# Patient Record
Sex: MALE | Race: OTHER | NOT HISPANIC OR LATINO
[De-identification: names, ages, dates, MRNs, and addresses within clinical notes are randomized per-mention and may not be internally consistent; named-entity substitution may affect disease eponyms.]

---

## 2018-09-10 ENCOUNTER — APPOINTMENT (OUTPATIENT)
Dept: INTERNAL MEDICINE | Facility: CLINIC | Age: 63
End: 2018-09-10
Payer: COMMERCIAL

## 2018-09-10 VITALS
HEART RATE: 67 BPM | TEMPERATURE: 98.3 F | OXYGEN SATURATION: 100 % | DIASTOLIC BLOOD PRESSURE: 96 MMHG | RESPIRATION RATE: 12 BRPM | SYSTOLIC BLOOD PRESSURE: 177 MMHG | HEIGHT: 67 IN | BODY MASS INDEX: 19.78 KG/M2 | WEIGHT: 126 LBS

## 2018-09-10 VITALS — SYSTOLIC BLOOD PRESSURE: 166 MMHG | DIASTOLIC BLOOD PRESSURE: 80 MMHG

## 2018-09-10 DIAGNOSIS — Z82.0 FAMILY HISTORY OF EPILEPSY AND OTHER DISEASES OF THE NERVOUS SYSTEM: ICD-10-CM

## 2018-09-10 DIAGNOSIS — J03.90 ACUTE TONSILLITIS, UNSPECIFIED: ICD-10-CM

## 2018-09-10 DIAGNOSIS — I25.9 CHRONIC ISCHEMIC HEART DISEASE, UNSPECIFIED: ICD-10-CM

## 2018-09-10 DIAGNOSIS — R94.31 ABNORMAL ELECTROCARDIOGRAM [ECG] [EKG]: ICD-10-CM

## 2018-09-10 DIAGNOSIS — Z12.11 ENCOUNTER FOR SCREENING FOR MALIGNANT NEOPLASM OF COLON: ICD-10-CM

## 2018-09-10 DIAGNOSIS — Z00.00 ENCOUNTER FOR GENERAL ADULT MEDICAL EXAMINATION W/OUT ABNORMAL FINDINGS: ICD-10-CM

## 2018-09-10 DIAGNOSIS — I51.7 CARDIOMEGALY: ICD-10-CM

## 2018-09-10 DIAGNOSIS — Z80.52 FAMILY HISTORY OF MALIGNANT NEOPLASM OF BLADDER: ICD-10-CM

## 2018-09-10 DIAGNOSIS — Z13.6 ENCOUNTER FOR SCREENING FOR CARDIOVASCULAR DISORDERS: ICD-10-CM

## 2018-09-10 PROCEDURE — 93000 ELECTROCARDIOGRAM COMPLETE: CPT

## 2018-09-10 PROCEDURE — 99214 OFFICE O/P EST MOD 30 MIN: CPT

## 2018-09-10 PROCEDURE — 99386 PREV VISIT NEW AGE 40-64: CPT

## 2018-09-23 PROBLEM — Z13.6 SCREENING FOR ISCHEMIC HEART DISEASE (IHD): Status: ACTIVE | Noted: 2018-09-23

## 2018-09-23 PROBLEM — I51.7 LAE (LEFT ATRIAL ENLARGEMENT): Noted: 2018-09-10

## 2018-09-23 PROBLEM — Z00.00 ENCOUNTER FOR PREVENTIVE HEALTH EXAMINATION: Status: ACTIVE | Noted: 2018-08-11

## 2018-09-23 PROBLEM — I25.9 IHD (ISCHEMIC HEART DISEASE): Noted: 2018-09-10

## 2018-09-23 PROBLEM — Z12.11 SCREENING FOR COLON CANCER: Status: ACTIVE | Noted: 2018-09-10

## 2018-09-23 PROBLEM — R94.31 ABNORMAL EKG: Noted: 2018-09-23

## 2018-09-23 LAB
25(OH)D3 SERPL-MCNC: 53.1 NG/ML
ALBUMIN SERPL ELPH-MCNC: 4.7 G/DL
ALP BLD-CCNC: 56 U/L
ALT SERPL-CCNC: 21 U/L
ANION GAP SERPL CALC-SCNC: 14 MMOL/L
APPEARANCE: CLEAR
AST SERPL-CCNC: 31 U/L
BILIRUB SERPL-MCNC: 0.5 MG/DL
BILIRUBIN URINE: NEGATIVE
BLOOD URINE: NEGATIVE
BUN SERPL-MCNC: 15 MG/DL
CALCIUM SERPL-MCNC: 9.6 MG/DL
CHLORIDE SERPL-SCNC: 101 MMOL/L
CHOLEST SERPL-MCNC: 162 MG/DL
CHOLEST/HDLC SERPL: 2.3 RATIO
CO2 SERPL-SCNC: 28 MMOL/L
COLOR: YELLOW
CREAT SERPL-MCNC: 0.78 MG/DL
ESTIMATED AVERAGE GLUCOSE: 108 MG/DL
FOLATE SERPL-MCNC: >20 NG/ML
GLUCOSE QUALITATIVE U: NEGATIVE MG/DL
GLUCOSE SERPL-MCNC: 98 MG/DL
HBA1C MFR BLD HPLC: 5.4 %
HDLC SERPL-MCNC: 71 MG/DL
IRON SERPL-MCNC: 73 UG/DL
KETONES URINE: NEGATIVE
LDLC SERPL CALC-MCNC: 85 MG/DL
LEUKOCYTE ESTERASE URINE: NEGATIVE
NITRITE URINE: NEGATIVE
PH URINE: 7
POTASSIUM SERPL-SCNC: 5.1 MMOL/L
PROT SERPL-MCNC: 7.7 G/DL
PROTEIN URINE: NEGATIVE MG/DL
PSA SERPL-MCNC: 4.29 NG/ML
SODIUM SERPL-SCNC: 143 MMOL/L
SPECIFIC GRAVITY URINE: 1.01
TRIGL SERPL-MCNC: 31 MG/DL
TSH SERPL-ACNC: 2.75 UIU/ML
UROBILINOGEN URINE: NEGATIVE MG/DL
VIT B12 SERPL-MCNC: 390 PG/ML

## 2018-09-23 NOTE — HISTORY OF PRESENT ILLNESS
[FreeTextEntry1] : New CPE [de-identified] : 63 yr old male w/hx of anxiety and HTN here to establish care.  \par \par C/O nocturia for several years.  Wakes 4x nightly; no frequency during day.  Not seen by urology for several years.  \par \par Last colonoscopy = 4 yr ago, but pt's bowel wasn't completely cleared despite finishing entire golytely prep.  What could be seen appeared normal to GI.  Overdue for F/U.\par \par Never had cardio eval / stress test.  Hx of HTN, but not on meds.  Always felt it was nervousness.

## 2018-09-23 NOTE — PHYSICAL EXAM
[No Acute Distress] : no acute distress [Well Nourished] : well nourished [Well Developed] : well developed [Well-Appearing] : well-appearing [Normal Voice/Communication] : normal voice/communication [Normal Sclera/Conjunctiva] : normal sclera/conjunctiva [PERRL] : pupils equal round and reactive to light [EOMI] : extraocular movements intact [Normal Outer Ear/Nose] : the outer ears and nose were normal in appearance [Normal Oropharynx] : the oropharynx was normal [No JVD] : no jugular venous distention [Supple] : supple [No Lymphadenopathy] : no lymphadenopathy [Thyroid Normal, No Nodules] : the thyroid was normal and there were no nodules present [No Respiratory Distress] : no respiratory distress  [Clear to Auscultation] : lungs were clear to auscultation bilaterally [No Accessory Muscle Use] : no accessory muscle use [Normal Rate] : normal rate  [Regular Rhythm] : with a regular rhythm [Normal S1, S2] : normal S1 and S2 [No Murmur] : no murmur heard [No Carotid Bruits] : no carotid bruits [No Edema] : there was no peripheral edema [Soft] : abdomen soft [Non Tender] : non-tender [Non-distended] : non-distended [No Masses] : no abdominal mass palpated [No HSM] : no HSM [Normal Bowel Sounds] : normal bowel sounds [Normal Supraclavicular Nodes] : no supraclavicular lymphadenopathy [Normal Posterior Cervical Nodes] : no posterior cervical lymphadenopathy [Normal Anterior Cervical Nodes] : no anterior cervical lymphadenopathy [No CVA Tenderness] : no CVA  tenderness [No Spinal Tenderness] : no spinal tenderness [No Joint Swelling] : no joint swelling [Grossly Normal Strength/Tone] : grossly normal strength/tone [No Rash] : no rash [Normal Gait] : normal gait [Coordination Grossly Intact] : coordination grossly intact [No Focal Deficits] : no focal deficits [Deep Tendon Reflexes (DTR)] : deep tendon reflexes were 2+ and symmetric [Speech Grossly Normal] : speech grossly normal [Memory Grossly Normal] : memory grossly normal [Normal Affect] : the affect was normal [Alert and Oriented x3] : oriented to person, place, and time [Normal Insight/Judgement] : insight and judgment were intact [de-identified] : unkempt [de-identified] : anxious mood

## 2018-09-23 NOTE — DATA REVIEWED
[FreeTextEntry1] : First EKG = NSR, low voltage QRS, anterolateral infarct.  Deemed erroneous due to lead placement.\par \par 2nd EKG = EKG done in office = NSR, no Q-waves, no ST changes, no inverted T-waves, no ectopy.

## 2018-09-23 NOTE — REVIEW OF SYSTEMS
[Nocturia] : nocturia [Anxiety] : anxiety [Negative] : Heme/Lymph [Dysuria] : no dysuria [Incontinence] : no incontinence [Hesitancy] : no hesitancy [Hematuria] : no hematuria [Frequency] : no frequency [Suicidal] : not suicidal [Insomnia] : no insomnia [Depression] : no depression

## 2018-09-23 NOTE — ASSESSMENT
[FreeTextEntry1] : 63 yr old male w/hx of anxiety, HTN, chronic fatigue, nocturia, here for initial visit and to have CPE/annual wellness exam, presenting today with the following: \par \par Health Maintenance = patient's exam is normal except as stated.  Labs to be collected today. \par \par HTN, anxiety, screening for IHD = normal EKG.  Labs ordered.  Referring to Cardio.\par \par Nocturia, Prostate Cancer Screening = referring patient to urology.  Urine studies & PSA collected.\par \par Colon Cancer Screening = referring patient to gastroenterology.  Stool sample ordered.\par \par Chronic fatigue = labs done to assess.  \par \par Counseled patient for greater than 50% of this visit, including diagnoses information, medication usage and side effects, therapeutic goals and options, and followup. Patient's questions were answered. Patient expressed understanding of, and agreement with, assessment and plan.\par \par Ahmet Martinez MD

## 2018-09-24 ENCOUNTER — APPOINTMENT (OUTPATIENT)
Dept: INTERNAL MEDICINE | Facility: CLINIC | Age: 63
End: 2018-09-24
Payer: COMMERCIAL

## 2018-09-24 VITALS
BODY MASS INDEX: 20.09 KG/M2 | WEIGHT: 128 LBS | DIASTOLIC BLOOD PRESSURE: 78 MMHG | OXYGEN SATURATION: 99 % | RESPIRATION RATE: 12 BRPM | HEART RATE: 51 BPM | TEMPERATURE: 98 F | HEIGHT: 67 IN | SYSTOLIC BLOOD PRESSURE: 161 MMHG

## 2018-09-24 VITALS — SYSTOLIC BLOOD PRESSURE: 138 MMHG | DIASTOLIC BLOOD PRESSURE: 74 MMHG

## 2018-09-24 DIAGNOSIS — Z78.9 OTHER SPECIFIED HEALTH STATUS: ICD-10-CM

## 2018-09-24 DIAGNOSIS — Z23 ENCOUNTER FOR IMMUNIZATION: ICD-10-CM

## 2018-09-24 PROCEDURE — 90686 IIV4 VACC NO PRSV 0.5 ML IM: CPT

## 2018-09-24 PROCEDURE — 90471 IMMUNIZATION ADMIN: CPT

## 2018-09-24 PROCEDURE — 99214 OFFICE O/P EST MOD 30 MIN: CPT | Mod: 25

## 2018-09-25 ENCOUNTER — MEDICATION RENEWAL (OUTPATIENT)
Age: 63
End: 2018-09-25

## 2018-09-27 PROBLEM — Z23 FLU VACCINE NEED: Status: ACTIVE | Noted: 2018-09-24

## 2018-09-27 NOTE — HISTORY OF PRESENT ILLNESS
[FreeTextEntry1] : F/u [de-identified] : 63 yr old male w/hx of anxiety and HTN.  Recently seen for CPE, noted to have uncontrolled HTN and was started on meds at that time.  Hx of HTN, but not on meds.  Always felt it was nervousness.  Never had cardio eval / stress test.  Taking medications as prescribed. Denies any HA, LH, LOC, COV, palpitations, CP, SOB, PEACE, abdominal pain, edema, claudication, limb weakness or paresthesias. \par \par Nocturia = chronic, denies bleeding, dysuria, pelvic or scrotal pain.  No Urology eval recently\par \par Requesting Flu vaccine today\par \par Also to review recent labs.

## 2018-09-27 NOTE — ASSESSMENT
[FreeTextEntry1] : 63 yr old male w/hx of anxiety, HTN, chronic fatigue, nocturia, here for the following:\par \par HTN = BP responding to metoprolol. Not at goal. Still needs to see cardiologist for her assessment. To followup in 2 weeks for BP recheck.\par \par PSA elevation, Nocturia, Prostate Cancer Screening = reviewed results with patient; referring patient to urology.  \par \par Colon Cancer Screening = reminded patient to complete stool sample.\par \par Flu vaccine today. \par \par Counseled patient for greater than 50% of this visit, including diagnoses information, medication usage and side effects, therapeutic goals and options, and followup. Patient's questions were answered. Patient expressed understanding of, and agreement with, assessment and plan.\par \par Ahmet Martinez MD

## 2018-09-27 NOTE — PHYSICAL EXAM
[No Acute Distress] : no acute distress [Well Nourished] : well nourished [Well Developed] : well developed [Well-Appearing] : well-appearing [Normal Voice/Communication] : normal voice/communication [Normal Sclera/Conjunctiva] : normal sclera/conjunctiva [EOMI] : extraocular movements intact [Normal Outer Ear/Nose] : the outer ears and nose were normal in appearance [No JVD] : no jugular venous distention [Supple] : supple [No Respiratory Distress] : no respiratory distress  [Clear to Auscultation] : lungs were clear to auscultation bilaterally [No Accessory Muscle Use] : no accessory muscle use [Normal Rate] : normal rate  [Regular Rhythm] : with a regular rhythm [Normal S1, S2] : normal S1 and S2 [No Murmur] : no murmur heard [No Carotid Bruits] : no carotid bruits [No Edema] : there was no peripheral edema [Soft] : abdomen soft [Non-distended] : non-distended [Normal Bowel Sounds] : normal bowel sounds [No Joint Swelling] : no joint swelling [Grossly Normal Strength/Tone] : grossly normal strength/tone [No Rash] : no rash [Normal Gait] : normal gait [Coordination Grossly Intact] : coordination grossly intact [No Focal Deficits] : no focal deficits [Deep Tendon Reflexes (DTR)] : deep tendon reflexes were 2+ and symmetric [Speech Grossly Normal] : speech grossly normal [Memory Grossly Normal] : memory grossly normal [Normal Affect] : the affect was normal [Alert and Oriented x3] : oriented to person, place, and time [Normal Insight/Judgement] : insight and judgment were intact [de-identified] : anxious mood

## 2018-09-29 ENCOUNTER — APPOINTMENT (OUTPATIENT)
Dept: CARDIOLOGY | Facility: CLINIC | Age: 63
End: 2018-09-29
Payer: COMMERCIAL

## 2018-09-29 ENCOUNTER — NON-APPOINTMENT (OUTPATIENT)
Age: 63
End: 2018-09-29

## 2018-09-29 VITALS
HEART RATE: 54 BPM | RESPIRATION RATE: 12 BRPM | HEIGHT: 67 IN | WEIGHT: 127 LBS | BODY MASS INDEX: 19.93 KG/M2 | OXYGEN SATURATION: 100 % | DIASTOLIC BLOOD PRESSURE: 87 MMHG | SYSTOLIC BLOOD PRESSURE: 155 MMHG | TEMPERATURE: 98 F

## 2018-09-29 VITALS — SYSTOLIC BLOOD PRESSURE: 136 MMHG | DIASTOLIC BLOOD PRESSURE: 80 MMHG | HEART RATE: 57 BPM

## 2018-09-29 DIAGNOSIS — Z86.79 PERSONAL HISTORY OF OTHER DISEASES OF THE CIRCULATORY SYSTEM: ICD-10-CM

## 2018-09-29 PROCEDURE — 99244 OFF/OP CNSLTJ NEW/EST MOD 40: CPT | Mod: 25

## 2018-09-29 PROCEDURE — 93000 ELECTROCARDIOGRAM COMPLETE: CPT

## 2018-10-10 LAB
BASOPHILS # BLD AUTO: 0.05 K/UL
BASOPHILS NFR BLD AUTO: 0.7 %
EOSINOPHIL # BLD AUTO: 0.1 K/UL
EOSINOPHIL NFR BLD AUTO: 1.4 %
HCT VFR BLD CALC: 42.1 %
HGB BLD-MCNC: 13.9 G/DL
IMM GRANULOCYTES NFR BLD AUTO: 0.1 %
LYMPHOCYTES # BLD AUTO: 1.43 K/UL
LYMPHOCYTES NFR BLD AUTO: 20 %
MAN DIFF?: NORMAL
MCHC RBC-ENTMCNC: 28.7 PG
MCHC RBC-ENTMCNC: 33 GM/DL
MCV RBC AUTO: 87 FL
MONOCYTES # BLD AUTO: 0.39 K/UL
MONOCYTES NFR BLD AUTO: 5.5 %
NEUTROPHILS # BLD AUTO: 5.17 K/UL
NEUTROPHILS NFR BLD AUTO: 72.3 %
PLATELET # BLD AUTO: 317 K/UL
RBC # BLD: 4.84 M/UL
RBC # FLD: 13.6 %
WBC # FLD AUTO: 7.15 K/UL

## 2018-10-15 ENCOUNTER — APPOINTMENT (OUTPATIENT)
Dept: CARDIOLOGY | Facility: CLINIC | Age: 63
End: 2018-10-15
Payer: COMMERCIAL

## 2018-10-15 ENCOUNTER — APPOINTMENT (OUTPATIENT)
Dept: UROLOGY | Facility: CLINIC | Age: 63
End: 2018-10-15
Payer: COMMERCIAL

## 2018-10-15 ENCOUNTER — APPOINTMENT (OUTPATIENT)
Dept: INTERNAL MEDICINE | Facility: CLINIC | Age: 63
End: 2018-10-15
Payer: COMMERCIAL

## 2018-10-15 VITALS
TEMPERATURE: 97.9 F | RESPIRATION RATE: 12 BRPM | WEIGHT: 127 LBS | BODY MASS INDEX: 19.93 KG/M2 | DIASTOLIC BLOOD PRESSURE: 79 MMHG | SYSTOLIC BLOOD PRESSURE: 146 MMHG | HEART RATE: 51 BPM | OXYGEN SATURATION: 100 % | HEIGHT: 67 IN

## 2018-10-15 VITALS — SYSTOLIC BLOOD PRESSURE: 134 MMHG | DIASTOLIC BLOOD PRESSURE: 70 MMHG

## 2018-10-15 DIAGNOSIS — Z12.5 ENCOUNTER FOR SCREENING FOR MALIGNANT NEOPLASM OF PROSTATE: ICD-10-CM

## 2018-10-15 DIAGNOSIS — R53.82 CHRONIC FATIGUE, UNSPECIFIED: ICD-10-CM

## 2018-10-15 DIAGNOSIS — R35.1 NOCTURIA: ICD-10-CM

## 2018-10-15 PROCEDURE — 99204 OFFICE O/P NEW MOD 45 MIN: CPT

## 2018-10-15 PROCEDURE — 93306 TTE W/DOPPLER COMPLETE: CPT

## 2018-10-15 PROCEDURE — 99214 OFFICE O/P EST MOD 30 MIN: CPT

## 2018-10-16 LAB
PSA FREE FLD-MCNC: 42.6
PSA FREE SERPL-MCNC: 1.03 NG/ML
PSA SERPL-MCNC: 2.42 NG/ML

## 2018-10-24 PROBLEM — R53.82 CHRONIC FATIGUE: Status: ACTIVE | Noted: 2018-09-10

## 2018-10-24 NOTE — HISTORY OF PRESENT ILLNESS
[FreeTextEntry1] : F/U [de-identified] : 63 yr old male w/hx of anxiety and HTN, for F/U of ongoing issues.  \par \par HTN = recently seen by Cardio.  Taking medications as prescribed. Denies any HA, LH, LOC, COV, palpitations, CP, SOB, PEACE, abdominal pain, edema, claudication, limb weakness or paresthesias. \par \par Nocturia = chronic, denies bleeding, dysuria, pelvic or scrotal pain.  Seen by Uro today.  Will be scheduling with Dr. Nava for further testing.

## 2018-10-24 NOTE — PHYSICAL EXAM
[No Acute Distress] : no acute distress [Well Nourished] : well nourished [Well Developed] : well developed [Well-Appearing] : well-appearing [Normal Voice/Communication] : normal voice/communication [Normal Sclera/Conjunctiva] : normal sclera/conjunctiva [EOMI] : extraocular movements intact [Normal Outer Ear/Nose] : the outer ears and nose were normal in appearance [No Respiratory Distress] : no respiratory distress  [Clear to Auscultation] : lungs were clear to auscultation bilaterally [No Accessory Muscle Use] : no accessory muscle use [Normal Rate] : normal rate  [Regular Rhythm] : with a regular rhythm [Normal S1, S2] : normal S1 and S2 [No Murmur] : no murmur heard [No Carotid Bruits] : no carotid bruits [No Edema] : there was no peripheral edema [Soft] : abdomen soft [Non-distended] : non-distended [No Joint Swelling] : no joint swelling [Grossly Normal Strength/Tone] : grossly normal strength/tone [No Rash] : no rash [Normal Gait] : normal gait [Coordination Grossly Intact] : coordination grossly intact [No Focal Deficits] : no focal deficits [Deep Tendon Reflexes (DTR)] : deep tendon reflexes were 2+ and symmetric [Speech Grossly Normal] : speech grossly normal [Memory Grossly Normal] : memory grossly normal [Normal Affect] : the affect was normal [Alert and Oriented x3] : oriented to person, place, and time [Normal Insight/Judgement] : insight and judgment were intact [de-identified] : anxious mood

## 2018-10-24 NOTE — ASSESSMENT
[FreeTextEntry1] : 63 yr old male w/hx of anxiety, HTN, chronic fatigue, nocturia, here for the following:\par \par HTN = BP responding to metoprolol. Not at goal. Still needs to see cardiologist for her assessment. To followup in 2 weeks for BP recheck.\par \par Chronic fatigue = no supportive lab findings.  Pt does have trouble sleeping due to nocturia, which will impact his restorative sleep time.  Sleep hygiene d/w pt.  If not improvement after nocturia is addressed, may need to consider sleep study.\par \par PSA elevation, Nocturia, Prostate Cancer Screening = reviewed results with patient; referring patient to urology.  \par \par Counseled patient for greater than 50% of this visit, including diagnoses information, medication usage and side effects, therapeutic goals and options, and followup. Patient's questions were answered. Patient expressed understanding of, and agreement with, assessment and plan.\par \par Ahmet Martinez MD

## 2018-11-23 ENCOUNTER — RESULT REVIEW (OUTPATIENT)
Age: 63
End: 2018-11-23

## 2018-11-23 LAB — HEMOCCULT STL QL IA: NEGATIVE

## 2019-02-02 ENCOUNTER — MEDICATION RENEWAL (OUTPATIENT)
Age: 64
End: 2019-02-02

## 2019-04-05 ENCOUNTER — MEDICATION RENEWAL (OUTPATIENT)
Age: 64
End: 2019-04-05

## 2019-04-19 ENCOUNTER — APPOINTMENT (OUTPATIENT)
Dept: INTERNAL MEDICINE | Facility: CLINIC | Age: 64
End: 2019-04-19
Payer: COMMERCIAL

## 2019-04-19 VITALS
HEIGHT: 67 IN | SYSTOLIC BLOOD PRESSURE: 134 MMHG | OXYGEN SATURATION: 98 % | RESPIRATION RATE: 12 BRPM | TEMPERATURE: 97.6 F | DIASTOLIC BLOOD PRESSURE: 82 MMHG | HEART RATE: 55 BPM | BODY MASS INDEX: 19.93 KG/M2 | WEIGHT: 127 LBS

## 2019-04-19 PROCEDURE — 99214 OFFICE O/P EST MOD 30 MIN: CPT

## 2019-05-03 ENCOUNTER — APPOINTMENT (OUTPATIENT)
Dept: CARDIOLOGY | Facility: CLINIC | Age: 64
End: 2019-05-03
Payer: COMMERCIAL

## 2019-05-03 VITALS — DIASTOLIC BLOOD PRESSURE: 74 MMHG | SYSTOLIC BLOOD PRESSURE: 124 MMHG

## 2019-05-03 VITALS
HEART RATE: 53 BPM | BODY MASS INDEX: 19.93 KG/M2 | WEIGHT: 127 LBS | TEMPERATURE: 98.2 F | OXYGEN SATURATION: 99 % | DIASTOLIC BLOOD PRESSURE: 69 MMHG | HEIGHT: 67 IN | SYSTOLIC BLOOD PRESSURE: 137 MMHG | RESPIRATION RATE: 12 BRPM

## 2019-05-03 PROCEDURE — 99214 OFFICE O/P EST MOD 30 MIN: CPT | Mod: 25

## 2019-05-04 NOTE — REVIEW OF SYSTEMS
[Feeling Fatigued] : feeling fatigued [Negative] : Heme/Lymph [Shortness Of Breath] : no shortness of breath [Lower Ext Edema] : no extremity edema [Dyspnea on exertion] : not dyspnea during exertion [Chest Pain] : no chest pain [Palpitations] : no palpitations

## 2019-05-04 NOTE — DISCUSSION/SUMMARY
[FreeTextEntry1] : IMPRESSION: Mr. SHI is a 63 year -old man with a history of hypertension, family history of coronary artery disease, and history of rheumatic fever who presents today for evaluation of his blood pressure. \par \par PLAN:\par 1. His blood pressure is very well controlled at this time. Given that he is bradycardic and experiences increased fatigue we will stop his Metoprolol and he will continue on diet modification. He will follow up in 6 weeks for a repeat blood pressure, and if it is elevated we will consider starting him on a different medication at that time. We also discussed decreasing the dose of Toprol XL to 12.5 mg daily, however, he prefers not to take anything at this time. \par 2. He will followup with me in 6 weeks for a blood pressure check or sooner if he is symptomatic.

## 2019-05-04 NOTE — PHYSICAL EXAM
[General Appearance - Well Developed] : well developed [Normal Appearance] : normal appearance [Well Groomed] : well groomed [General Appearance - Well Nourished] : well nourished [No Deformities] : no deformities [General Appearance - In No Acute Distress] : no acute distress [Normal Conjunctiva] : the conjunctiva exhibited no abnormalities [Normal Oral Mucosa] : normal oral mucosa [No Oral Pallor] : no oral pallor [No Oral Cyanosis] : no oral cyanosis [Normal Jugular Venous A Waves Present] : normal jugular venous A waves present [Normal Jugular Venous V Waves Present] : normal jugular venous V waves present [Respiration, Rhythm And Depth] : normal respiratory rhythm and effort [Auscultation Breath Sounds / Voice Sounds] : lungs were clear to auscultation bilaterally [Exaggerated Use Of Accessory Muscles For Inspiration] : no accessory muscle use [Bradycardia] : bradycardic [Rhythm Regular] : regular [Normal S1] : normal S1 [Normal S2] : normal S2 [No Pitting Edema] : no pitting edema present [Abdomen Soft] : soft [Abdomen Tenderness] : non-tender [Abnormal Walk] : normal gait [Gait - Sufficient For Exercise Testing] : the gait was sufficient for exercise testing [Nail Clubbing] : no clubbing of the fingernails [Cyanosis, Localized] : no localized cyanosis [Petechial Hemorrhages (___cm)] : no petechial hemorrhages [] : no rash [Skin Color & Pigmentation] : normal skin color and pigmentation [Skin Lesions] : no skin lesions [Oriented To Time, Place, And Person] : oriented to person, place, and time [Mood] : the mood was normal [Affect] : the affect was normal [No Anxiety] : not feeling anxious [FreeTextEntry1] : Extraocular muscles intact. Anicteric sclerae. [Left Carotid Bruit] : no bruit heard over the left carotid [Right Carotid Bruit] : no bruit heard over the right carotid [Bruit] : no bruit heard [Bowel Sounds] : normal bowel sounds [No Skin Ulcers] : no skin ulcer

## 2019-05-04 NOTE — HISTORY OF PRESENT ILLNESS
[FreeTextEntry1] : Patient is a 63-year-old man with a history of hypertension, family history of coronary artery disease, and history of rheumatic fever who presents today for evaluation of his blood pressure. He has been feeling increased fatigue while taking the Metoprolol. He has changed his diet, cutting out salt and meat. He otherwise denies any chest pain, dyspnea, palpitations, headaches, or dizziness.

## 2019-05-05 NOTE — PHYSICAL EXAM
[No Acute Distress] : no acute distress [Well Nourished] : well nourished [Well Developed] : well developed [Normal Sclera/Conjunctiva] : normal sclera/conjunctiva [Well-Appearing] : well-appearing [PERRL] : pupils equal round and reactive to light [EOMI] : extraocular movements intact [Normal Outer Ear/Nose] : the outer ears and nose were normal in appearance [Normal Oropharynx] : the oropharynx was normal [No JVD] : no jugular venous distention [Supple] : supple [No Lymphadenopathy] : no lymphadenopathy [Thyroid Normal, No Nodules] : the thyroid was normal and there were no nodules present [No Respiratory Distress] : no respiratory distress  [Clear to Auscultation] : lungs were clear to auscultation bilaterally [No Accessory Muscle Use] : no accessory muscle use [Normal Rate] : normal rate  [Normal S1, S2] : normal S1 and S2 [Regular Rhythm] : with a regular rhythm [No Murmur] : no murmur heard [No Carotid Bruits] : no carotid bruits [Pedal Pulses Present] : the pedal pulses are present [No Edema] : there was no peripheral edema [Soft] : abdomen soft [Non-distended] : non-distended [Non Tender] : non-tender [No HSM] : no HSM [No Masses] : no abdominal mass palpated [Normal Bowel Sounds] : normal bowel sounds [Normal Posterior Cervical Nodes] : no posterior cervical lymphadenopathy [Normal Anterior Cervical Nodes] : no anterior cervical lymphadenopathy [No CVA Tenderness] : no CVA  tenderness [No Joint Swelling] : no joint swelling [No Spinal Tenderness] : no spinal tenderness [Grossly Normal Strength/Tone] : grossly normal strength/tone [No Rash] : no rash [Normal Gait] : normal gait [Coordination Grossly Intact] : coordination grossly intact [No Focal Deficits] : no focal deficits [Normal Affect] : the affect was normal [Normal Insight/Judgement] : insight and judgment were intact

## 2019-05-05 NOTE — ASSESSMENT
[FreeTextEntry1] : 1. Hypertension\par continue Metoprolol\par cardio follow up \par 2. Elevated PSA\par following with urologist

## 2019-05-05 NOTE — HISTORY OF PRESENT ILLNESS
[de-identified] : Patient is a 63-year-old man with a history of hypertension,  rheumatic fever/ elevated PSA  who presents today for follow up and to establish care.He has been feeling increased fatigue while taking the Metoprolol. He has changed his diet, cutting out salt and meat. He otherwise denies any chest pain, dyspnea, palpitations, headaches, or dizziness , N, V, abdominal pain \par He is following with Dr. Nava for elevated PSA.

## 2019-06-26 ENCOUNTER — APPOINTMENT (OUTPATIENT)
Dept: CARDIOLOGY | Facility: CLINIC | Age: 64
End: 2019-06-26
Payer: COMMERCIAL

## 2019-06-26 VITALS
DIASTOLIC BLOOD PRESSURE: 75 MMHG | RESPIRATION RATE: 12 BRPM | SYSTOLIC BLOOD PRESSURE: 123 MMHG | HEIGHT: 67 IN | WEIGHT: 127 LBS | HEART RATE: 71 BPM | BODY MASS INDEX: 19.93 KG/M2 | OXYGEN SATURATION: 100 %

## 2019-06-26 DIAGNOSIS — I34.0 NONRHEUMATIC MITRAL (VALVE) INSUFFICIENCY: ICD-10-CM

## 2019-06-26 PROCEDURE — 99213 OFFICE O/P EST LOW 20 MIN: CPT | Mod: 25

## 2019-06-26 NOTE — HISTORY OF PRESENT ILLNESS
[FreeTextEntry1] : Patient is a 64-year-old man with a history of hypertension, family history of coronary artery disease, and history of rheumatic fever who presents today for follow up of blood pressure. He states that he has been feeling well off of Metoprolol and he has continued on his diet. He denies any chest pain, dyspnea, palpitations, headaches, or dizziness.

## 2019-06-26 NOTE — PHYSICAL EXAM
[General Appearance - Well Developed] : well developed [Normal Appearance] : normal appearance [Well Groomed] : well groomed [General Appearance - Well Nourished] : well nourished [No Deformities] : no deformities [General Appearance - In No Acute Distress] : no acute distress [Normal Conjunctiva] : the conjunctiva exhibited no abnormalities [Normal Oral Mucosa] : normal oral mucosa [No Oral Pallor] : no oral pallor [No Oral Cyanosis] : no oral cyanosis [Normal Jugular Venous A Waves Present] : normal jugular venous A waves present [Normal Jugular Venous V Waves Present] : normal jugular venous V waves present [Respiration, Rhythm And Depth] : normal respiratory rhythm and effort [Auscultation Breath Sounds / Voice Sounds] : lungs were clear to auscultation bilaterally [Exaggerated Use Of Accessory Muscles For Inspiration] : no accessory muscle use [Bowel Sounds] : normal bowel sounds [Abdomen Soft] : soft [Abdomen Tenderness] : non-tender [Abnormal Walk] : normal gait [Gait - Sufficient For Exercise Testing] : the gait was sufficient for exercise testing [Cyanosis, Localized] : no localized cyanosis [Nail Clubbing] : no clubbing of the fingernails [Petechial Hemorrhages (___cm)] : no petechial hemorrhages [Skin Color & Pigmentation] : normal skin color and pigmentation [Skin Lesions] : no skin lesions [] : no rash [No Skin Ulcers] : no skin ulcer [Oriented To Time, Place, And Person] : oriented to person, place, and time [Affect] : the affect was normal [Mood] : the mood was normal [No Anxiety] : not feeling anxious [Rhythm Regular] : regular [Normal S2] : normal S2 [Normal S1] : normal S1 [No Pitting Edema] : no pitting edema present [FreeTextEntry1] : Extraocular muscles intact. Anicteric sclerae. [Normal Rate] : normal [I] : a grade 1 [Right Carotid Bruit] : no bruit heard over the right carotid [Left Carotid Bruit] : no bruit heard over the left carotid [Bruit] : no bruit heard

## 2019-06-26 NOTE — DISCUSSION/SUMMARY
[FreeTextEntry1] : IMPRESSION: Mr. SHI is a 64 year -old man with a history of hypertension, family history of coronary artery disease, and history of rheumatic fever who presents today for follow up of his blood pressure. \par \par PLAN:\par 1. His blood pressure is very well controlled today as is his heart rate, thus he will continue off of medications. \par 2. He will schedule an echocardiogram to follow up his mitral regurgitation. \par 3. He will follow up with me in 4 months or sooner should he experience any symptoms in the interim.

## 2019-07-13 ENCOUNTER — APPOINTMENT (OUTPATIENT)
Dept: CARDIOLOGY | Facility: CLINIC | Age: 64
End: 2019-07-13

## 2019-09-13 ENCOUNTER — APPOINTMENT (OUTPATIENT)
Dept: INTERNAL MEDICINE | Facility: CLINIC | Age: 64
End: 2019-09-13
Payer: COMMERCIAL

## 2019-09-13 ENCOUNTER — LABORATORY RESULT (OUTPATIENT)
Age: 64
End: 2019-09-13

## 2019-09-13 VITALS
BODY MASS INDEX: 18.93 KG/M2 | TEMPERATURE: 97.8 F | WEIGHT: 120.6 LBS | HEIGHT: 67 IN | RESPIRATION RATE: 12 BRPM | OXYGEN SATURATION: 100 % | DIASTOLIC BLOOD PRESSURE: 73 MMHG | SYSTOLIC BLOOD PRESSURE: 134 MMHG | HEART RATE: 60 BPM

## 2019-09-13 PROCEDURE — 99396 PREV VISIT EST AGE 40-64: CPT

## 2019-09-13 NOTE — PHYSICAL EXAM
[No Acute Distress] : no acute distress [Well Developed] : well developed [Well Nourished] : well nourished [Well-Appearing] : well-appearing [Normal Sclera/Conjunctiva] : normal sclera/conjunctiva [PERRL] : pupils equal round and reactive to light [EOMI] : extraocular movements intact [Normal Outer Ear/Nose] : the outer ears and nose were normal in appearance [Normal Oropharynx] : the oropharynx was normal [No JVD] : no jugular venous distention [Supple] : supple [No Lymphadenopathy] : no lymphadenopathy [Thyroid Normal, No Nodules] : the thyroid was normal and there were no nodules present [No Respiratory Distress] : no respiratory distress  [No Accessory Muscle Use] : no accessory muscle use [Clear to Auscultation] : lungs were clear to auscultation bilaterally [Normal Rate] : normal rate  [Regular Rhythm] : with a regular rhythm [Normal S1, S2] : normal S1 and S2 [No Murmur] : no murmur heard [No Carotid Bruits] : no carotid bruits [Pedal Pulses Present] : the pedal pulses are present [No Edema] : there was no peripheral edema [Soft] : abdomen soft [Non Tender] : non-tender [Non-distended] : non-distended [No Masses] : no abdominal mass palpated [No HSM] : no HSM [Normal Bowel Sounds] : normal bowel sounds [Normal Anterior Cervical Nodes] : no anterior cervical lymphadenopathy [Normal Posterior Cervical Nodes] : no posterior cervical lymphadenopathy [No CVA Tenderness] : no CVA  tenderness [No Spinal Tenderness] : no spinal tenderness [No Joint Swelling] : no joint swelling [Grossly Normal Strength/Tone] : grossly normal strength/tone [No Rash] : no rash [Coordination Grossly Intact] : coordination grossly intact [No Focal Deficits] : no focal deficits [Normal Gait] : normal gait [Normal Affect] : the affect was normal [Normal Insight/Judgement] : insight and judgment were intact

## 2019-09-13 NOTE — HISTORY OF PRESENT ILLNESS
[de-identified] : 64 year old male with h/o Htn, rheumatic fever presents for annual physical.\par He is doing well, physically active. He denies CP/SOB, dizziness, exertional symptoms, abdominal pain. \par Last colonoscopy  5 years ago\par Pt following with Dr. Nava for elevated PSA\par He lives alone, denies problems with ADL

## 2019-09-16 LAB
25(OH)D3 SERPL-MCNC: 52.9 NG/ML
ALBUMIN SERPL ELPH-MCNC: 4.2 G/DL
ALP BLD-CCNC: 52 U/L
ALT SERPL-CCNC: 24 U/L
ANION GAP SERPL CALC-SCNC: 14 MMOL/L
APPEARANCE: CLEAR
AST SERPL-CCNC: 27 U/L
BACTERIA: NEGATIVE
BILIRUB SERPL-MCNC: 0.3 MG/DL
BILIRUBIN URINE: NEGATIVE
BLOOD URINE: NEGATIVE
BUN SERPL-MCNC: 12 MG/DL
CALCIUM SERPL-MCNC: 9.3 MG/DL
CHLORIDE SERPL-SCNC: 101 MMOL/L
CHOLEST SERPL-MCNC: 151 MG/DL
CHOLEST/HDLC SERPL: 2.2 RATIO
CO2 SERPL-SCNC: 28 MMOL/L
COLOR: NORMAL
CREAT SERPL-MCNC: 0.77 MG/DL
ESTIMATED AVERAGE GLUCOSE: 108 MG/DL
GLUCOSE QUALITATIVE U: NEGATIVE
GLUCOSE SERPL-MCNC: 79 MG/DL
HBA1C MFR BLD HPLC: 5.4 %
HDLC SERPL-MCNC: 70 MG/DL
HYALINE CASTS: 0 /LPF
KETONES URINE: NEGATIVE
LDLC SERPL CALC-MCNC: 71 MG/DL
LEUKOCYTE ESTERASE URINE: NEGATIVE
MICROSCOPIC-UA: NORMAL
NITRITE URINE: NEGATIVE
PH URINE: 6.5
POTASSIUM SERPL-SCNC: 4.4 MMOL/L
PROT SERPL-MCNC: 6.8 G/DL
PROTEIN URINE: NEGATIVE
PSA SERPL-MCNC: 3.58 NG/ML
RED BLOOD CELLS URINE: 1 /HPF
SODIUM SERPL-SCNC: 143 MMOL/L
SPECIFIC GRAVITY URINE: 1.01
SQUAMOUS EPITHELIAL CELLS: 0 /HPF
TRIGL SERPL-MCNC: 48 MG/DL
TSH SERPL-ACNC: 1.51 UIU/ML
UROBILINOGEN URINE: NORMAL
VIT B12 SERPL-MCNC: 327 PG/ML
WHITE BLOOD CELLS URINE: 1 /HPF

## 2019-09-23 LAB
BASOPHILS # BLD AUTO: 0.07 K/UL
BASOPHILS NFR BLD AUTO: 1.6 %
EOSINOPHIL # BLD AUTO: 0.2 K/UL
EOSINOPHIL NFR BLD AUTO: 4.7 %
HCT VFR BLD CALC: 39.7 %
HGB BLD-MCNC: 13.2 G/DL
IMM GRANULOCYTES NFR BLD AUTO: 0.2 %
LYMPHOCYTES # BLD AUTO: 0.99 K/UL
LYMPHOCYTES NFR BLD AUTO: 23.3 %
MAN DIFF?: NORMAL
MCHC RBC-ENTMCNC: 30 PG
MCHC RBC-ENTMCNC: 33.2 GM/DL
MCV RBC AUTO: 90.2 FL
MONOCYTES # BLD AUTO: 0.38 K/UL
MONOCYTES NFR BLD AUTO: 8.9 %
NEUTROPHILS # BLD AUTO: 2.6 K/UL
NEUTROPHILS NFR BLD AUTO: 61.3 %
PLATELET # BLD AUTO: 292 K/UL
RBC # BLD: 4.4 M/UL
RBC # FLD: 13 %
WBC # FLD AUTO: 4.25 K/UL

## 2020-07-29 ENCOUNTER — APPOINTMENT (OUTPATIENT)
Dept: INTERNAL MEDICINE | Facility: CLINIC | Age: 65
End: 2020-07-29
Payer: MEDICARE

## 2020-07-29 VITALS
WEIGHT: 125 LBS | DIASTOLIC BLOOD PRESSURE: 77 MMHG | TEMPERATURE: 98.8 F | OXYGEN SATURATION: 98 % | RESPIRATION RATE: 12 BRPM | BODY MASS INDEX: 19.62 KG/M2 | HEART RATE: 78 BPM | SYSTOLIC BLOOD PRESSURE: 124 MMHG | HEIGHT: 67 IN

## 2020-07-29 DIAGNOSIS — R19.09 OTHER INTRA-ABDOMINAL AND PELVIC SWELLING, MASS AND LUMP: ICD-10-CM

## 2020-07-29 DIAGNOSIS — B36.9 SUPERFICIAL MYCOSIS, UNSPECIFIED: ICD-10-CM

## 2020-07-29 PROCEDURE — 99214 OFFICE O/P EST MOD 30 MIN: CPT

## 2020-07-29 NOTE — PHYSICAL EXAM
[No Acute Distress] : no acute distress [Well Nourished] : well nourished [Normal Sclera/Conjunctiva] : normal sclera/conjunctiva [Well-Appearing] : well-appearing [Well Developed] : well developed [EOMI] : extraocular movements intact [Normal Outer Ear/Nose] : the outer ears and nose were normal in appearance [Normal Oropharynx] : the oropharynx was normal [Normal TMs] : both tympanic membranes were normal [No JVD] : no jugular venous distention [No Lymphadenopathy] : no lymphadenopathy [No Respiratory Distress] : no respiratory distress  [Supple] : supple [No Accessory Muscle Use] : no accessory muscle use [Clear to Auscultation] : lungs were clear to auscultation bilaterally [Regular Rhythm] : with a regular rhythm [Normal Rate] : normal rate  [No Carotid Bruits] : no carotid bruits [Normal S1, S2] : normal S1 and S2 [Pedal Pulses Present] : the pedal pulses are present [No Edema] : there was no peripheral edema [Non Tender] : non-tender [Soft] : abdomen soft [Non-distended] : non-distended [Normal Bowel Sounds] : normal bowel sounds [Normal Anterior Cervical Nodes] : no anterior cervical lymphadenopathy [Normal Posterior Cervical Nodes] : no posterior cervical lymphadenopathy [No Spinal Tenderness] : no spinal tenderness [No Joint Swelling] : no joint swelling [No CVA Tenderness] : no CVA  tenderness [Grossly Normal Strength/Tone] : grossly normal strength/tone [Coordination Grossly Intact] : coordination grossly intact [Deep Tendon Reflexes (DTR)] : deep tendon reflexes were 2+ and symmetric [Normal Gait] : normal gait [No Focal Deficits] : no focal deficits [de-identified] : ? RT inguinal hernia  [Normal Affect] : the affect was normal [Normal Insight/Judgement] : insight and judgment were intact [de-identified] : + residual rash from shingles/ + fungal dermatitis

## 2020-07-29 NOTE — HISTORY OF PRESENT ILLNESS
[de-identified] : 65 year old male with h/o Htn ( diet controlled ) / rheumatic fever presents for follow up .He fell in his  house in December/scraped back > in early January developed shingles Rt side spreading to the back > rash resolved but since than experiencing itchiness in the area\par Also c/o Rt groin lump x few weeks, denies local pain, discomfort. Lump getting bigger with exercise \par Last colonoscopy  3-4 years ago\par He is doing well otherwise, denies CP/SOB, dizziness , abd pain.

## 2020-08-04 ENCOUNTER — LABORATORY RESULT (OUTPATIENT)
Age: 65
End: 2020-08-04

## 2020-08-06 LAB
25(OH)D3 SERPL-MCNC: 53.1 NG/ML
ALBUMIN SERPL ELPH-MCNC: 4.6 G/DL
ALP BLD-CCNC: 50 U/L
ALT SERPL-CCNC: 18 U/L
ANION GAP SERPL CALC-SCNC: 18 MMOL/L
APPEARANCE: CLEAR
AST SERPL-CCNC: 24 U/L
BACTERIA: NEGATIVE
BASOPHILS # BLD AUTO: 0.07 K/UL
BASOPHILS NFR BLD AUTO: 1.3 %
BILIRUB SERPL-MCNC: 0.4 MG/DL
BILIRUBIN URINE: NEGATIVE
BLOOD URINE: NEGATIVE
BUN SERPL-MCNC: 17 MG/DL
CALCIUM SERPL-MCNC: 9.4 MG/DL
CHLORIDE SERPL-SCNC: 101 MMOL/L
CHOLEST SERPL-MCNC: 175 MG/DL
CHOLEST/HDLC SERPL: 2.3 RATIO
CO2 SERPL-SCNC: 25 MMOL/L
COLOR: NORMAL
CREAT SERPL-MCNC: 0.89 MG/DL
EOSINOPHIL # BLD AUTO: 0.18 K/UL
EOSINOPHIL NFR BLD AUTO: 3.4 %
ESTIMATED AVERAGE GLUCOSE: 108 MG/DL
GLUCOSE QUALITATIVE U: NEGATIVE
GLUCOSE SERPL-MCNC: 107 MG/DL
HBA1C MFR BLD HPLC: 5.4 %
HCT VFR BLD CALC: 42.2 %
HDLC SERPL-MCNC: 75 MG/DL
HGB BLD-MCNC: 13.7 G/DL
HYALINE CASTS: 0 /LPF
IMM GRANULOCYTES NFR BLD AUTO: 0.2 %
KETONES URINE: NEGATIVE
LDLC SERPL CALC-MCNC: 89 MG/DL
LEUKOCYTE ESTERASE URINE: NEGATIVE
LYMPHOCYTES # BLD AUTO: 1.24 K/UL
LYMPHOCYTES NFR BLD AUTO: 23.3 %
MAN DIFF?: NORMAL
MCHC RBC-ENTMCNC: 29.8 PG
MCHC RBC-ENTMCNC: 32.5 GM/DL
MCV RBC AUTO: 91.9 FL
MICROSCOPIC-UA: NORMAL
MONOCYTES # BLD AUTO: 0.41 K/UL
MONOCYTES NFR BLD AUTO: 7.7 %
NEUTROPHILS # BLD AUTO: 3.42 K/UL
NEUTROPHILS NFR BLD AUTO: 64.1 %
NITRITE URINE: NEGATIVE
PH URINE: 6
PLATELET # BLD AUTO: 277 K/UL
POTASSIUM SERPL-SCNC: 4.5 MMOL/L
PROT SERPL-MCNC: 6.8 G/DL
PROTEIN URINE: NEGATIVE
PSA SERPL-MCNC: 4.48 NG/ML
RBC # BLD: 4.59 M/UL
RBC # FLD: 13 %
RED BLOOD CELLS URINE: 1 /HPF
SODIUM SERPL-SCNC: 144 MMOL/L
SPECIFIC GRAVITY URINE: 1.01
SQUAMOUS EPITHELIAL CELLS: 0 /HPF
TRIGL SERPL-MCNC: 56 MG/DL
TSH SERPL-ACNC: 1.48 UIU/ML
UROBILINOGEN URINE: NORMAL
VIT B12 SERPL-MCNC: 257 PG/ML
WBC # FLD AUTO: 5.33 K/UL
WHITE BLOOD CELLS URINE: 1 /HPF

## 2020-08-28 ENCOUNTER — APPOINTMENT (OUTPATIENT)
Dept: UROLOGY | Facility: CLINIC | Age: 65
End: 2020-08-28
Payer: MEDICARE

## 2020-08-28 VITALS
HEART RATE: 79 BPM | DIASTOLIC BLOOD PRESSURE: 84 MMHG | WEIGHT: 125 LBS | RESPIRATION RATE: 16 BRPM | HEIGHT: 67 IN | BODY MASS INDEX: 19.62 KG/M2 | SYSTOLIC BLOOD PRESSURE: 135 MMHG

## 2020-08-28 VITALS — TEMPERATURE: 98.2 F

## 2020-08-28 PROCEDURE — 99213 OFFICE O/P EST LOW 20 MIN: CPT

## 2020-08-28 NOTE — ASSESSMENT
[FreeTextEntry1] : 66 yo M hx of BPH w/ LUTS, HTN who presents today for evaluation of elevated PSA, most recently up to 4.48 on 7/29/20 . Hs PSA's have waxed and waned overtime, previously peaking at 4.29 in Sep 2018. Given his exam and LUTS, his BPH is likely contributing to his PSA elevations. We will plan to recheck a PSA today. Should it be consistently elevated, can revisit the need for further w/u of his PSA elevation, likely with MRI\par \par - PSA today. Will contact with result.

## 2020-08-28 NOTE — PHYSICAL EXAM
[Normal Appearance] : normal appearance [General Appearance - In No Acute Distress] : no acute distress [Heart Rate And Rhythm] : Heart rate and rhythm were normal [] : no respiratory distress [Exaggerated Use Of Accessory Muscles For Inspiration] : no accessory muscle use [Penis Abnormality] : normal circumcised penis [Urethral Meatus] : meatus normal [Normal Station and Gait] : the gait and station were normal for the patient's age [Skin Color & Pigmentation] : normal skin color and pigmentation [No Focal Deficits] : no focal deficits [Oriented To Time, Place, And Person] : oriented to person, place, and time [No Palpable Adenopathy] : no palpable adenopathy [FreeTextEntry1] : enlarged prostate felt on BITA. No nodules felt. No blood on glove following exam. right sided inguinal hernia. no inguinal LAD. normal testicles b/l w/o masses or swelling.

## 2020-08-28 NOTE — HISTORY OF PRESENT ILLNESS
[FreeTextEntry1] : CC: Elevated PSA\par \par Mr Brianna is a 66 yo M with a hx of HTN, LUTS who presents today for evaluation of an elevated PSA. He has his PSA's checked by his PCP, Dr. Hargrove. His most recent PSA from 7/29/20 was 4.48. PSA's prior to that are as follows (3.58 Sep 2019, 2.4 Oct 2018, 4.29 Sep 2018). He reports LUTS for several years now. Primarily has nocturia (4x per night). No problems with urinary frequency or dysuria. No hematuria. He was previously seen by Dr. Nava in October 2018 for evaluation of his PSA elevation and LUTS, however has not seen him since. Was recently seen by his PCP who diagnosed him w/ fungal dermatitis over the chest for which he is taking topical lotrisone cream. Also found to have a groin lump, suggestive of inguinal hernia. He has a referral ordered with general surgery. \par \par No family hx of prostate, breast, or ovarian cancer. Father did have cancer of the bladder. Non smoker and eats a vegetarian diet.

## 2020-08-31 LAB
PSA FREE FLD-MCNC: 41 %
PSA FREE SERPL-MCNC: 2.36 NG/ML
PSA SERPL-MCNC: 5.8 NG/ML

## 2020-09-27 ENCOUNTER — OUTPATIENT (OUTPATIENT)
Dept: OUTPATIENT SERVICES | Facility: HOSPITAL | Age: 65
LOS: 1 days | End: 2020-09-27
Payer: MEDICARE

## 2020-09-27 ENCOUNTER — RESULT REVIEW (OUTPATIENT)
Age: 65
End: 2020-09-27

## 2020-09-27 ENCOUNTER — APPOINTMENT (OUTPATIENT)
Dept: MRI IMAGING | Facility: IMAGING CENTER | Age: 65
End: 2020-09-27
Payer: MEDICARE

## 2020-09-27 DIAGNOSIS — R97.20 ELEVATED PROSTATE SPECIFIC ANTIGEN [PSA]: ICD-10-CM

## 2020-09-27 PROCEDURE — 72197 MRI PELVIS W/O & W/DYE: CPT

## 2020-09-27 PROCEDURE — 76377 3D RENDER W/INTRP POSTPROCES: CPT

## 2020-09-27 PROCEDURE — 76377 3D RENDER W/INTRP POSTPROCES: CPT | Mod: 26

## 2020-09-27 PROCEDURE — A9585: CPT

## 2020-09-27 PROCEDURE — 72197 MRI PELVIS W/O & W/DYE: CPT | Mod: 26

## 2020-11-05 ENCOUNTER — APPOINTMENT (OUTPATIENT)
Dept: CARDIOLOGY | Facility: CLINIC | Age: 65
End: 2020-11-05
Payer: MEDICARE

## 2020-11-05 ENCOUNTER — OUTPATIENT (OUTPATIENT)
Dept: OUTPATIENT SERVICES | Facility: HOSPITAL | Age: 65
LOS: 1 days | End: 2020-11-05
Payer: COMMERCIAL

## 2020-11-05 ENCOUNTER — NON-APPOINTMENT (OUTPATIENT)
Age: 65
End: 2020-11-05

## 2020-11-05 ENCOUNTER — APPOINTMENT (OUTPATIENT)
Dept: CV DIAGNOSITCS | Facility: HOSPITAL | Age: 65
End: 2020-11-05

## 2020-11-05 VITALS
BODY MASS INDEX: 19.73 KG/M2 | DIASTOLIC BLOOD PRESSURE: 75 MMHG | SYSTOLIC BLOOD PRESSURE: 130 MMHG | WEIGHT: 126 LBS | HEART RATE: 60 BPM

## 2020-11-05 DIAGNOSIS — I10 ESSENTIAL (PRIMARY) HYPERTENSION: ICD-10-CM

## 2020-11-05 PROCEDURE — 93306 TTE W/DOPPLER COMPLETE: CPT | Mod: 26

## 2020-11-05 PROCEDURE — 99213 OFFICE O/P EST LOW 20 MIN: CPT | Mod: 25

## 2020-11-05 PROCEDURE — 93000 ELECTROCARDIOGRAM COMPLETE: CPT

## 2020-11-05 PROCEDURE — 93306 TTE W/DOPPLER COMPLETE: CPT

## 2020-11-05 PROCEDURE — 99072 ADDL SUPL MATRL&STAF TM PHE: CPT

## 2020-11-05 RX ORDER — METOPROLOL SUCCINATE 25 MG/1
25 TABLET, EXTENDED RELEASE ORAL DAILY
Qty: 30 | Refills: 0 | Status: DISCONTINUED | COMMUNITY
Start: 2018-09-10 | End: 2020-11-05

## 2021-01-27 ENCOUNTER — APPOINTMENT (OUTPATIENT)
Dept: CARDIOLOGY | Facility: CLINIC | Age: 66
End: 2021-01-27

## 2021-02-05 NOTE — DISCUSSION/SUMMARY
[FreeTextEntry1] : IMPRESSION: Mr. SHI is a 65 year-old man with a history of hypertension, family history of coronary artery disease, and history of rheumatic fever who presents today for follow up of his blood pressure. \par \par PLAN:\par 1. His blood pressure is well controlled today as is his heart rate, thus he will continue off of medications. \par 2. His echocardiogram that was performed today revealed preserved LV function with minimal mitral regurgitation. \par 3. His ECG was slightly different today, however, he wants to hold off on a stress test as his echocardiogram was normal.\par 4. He will follow up with me in 6 months or sooner should he experience any symptoms in the interim.

## 2021-02-05 NOTE — PHYSICAL EXAM
[General Appearance - Well Developed] : well developed [Normal Appearance] : normal appearance [Well Groomed] : well groomed [General Appearance - Well Nourished] : well nourished [No Deformities] : no deformities [General Appearance - In No Acute Distress] : no acute distress [Normal Conjunctiva] : the conjunctiva exhibited no abnormalities [Normal Jugular Venous A Waves Present] : normal jugular venous A waves present [Normal Jugular Venous V Waves Present] : normal jugular venous V waves present [Respiration, Rhythm And Depth] : normal respiratory rhythm and effort [Exaggerated Use Of Accessory Muscles For Inspiration] : no accessory muscle use [Auscultation Breath Sounds / Voice Sounds] : lungs were clear to auscultation bilaterally [Bowel Sounds] : normal bowel sounds [Abdomen Soft] : soft [Abdomen Tenderness] : non-tender [Abnormal Walk] : normal gait [Gait - Sufficient For Exercise Testing] : the gait was sufficient for exercise testing [Nail Clubbing] : no clubbing of the fingernails [Cyanosis, Localized] : no localized cyanosis [Petechial Hemorrhages (___cm)] : no petechial hemorrhages [Skin Color & Pigmentation] : normal skin color and pigmentation [] : no rash [No Skin Ulcers] : no skin ulcer [Oriented To Time, Place, And Person] : oriented to person, place, and time [Affect] : the affect was normal [Mood] : the mood was normal [No Anxiety] : not feeling anxious [Normal Rate] : normal [Rhythm Regular] : regular [Normal S1] : normal S1 [Normal S2] : normal S2 [I] : a grade 1 [No Pitting Edema] : no pitting edema present [FreeTextEntry1] : He was wearing a face mask during the examination.  [S3] : no S3 [Right Carotid Bruit] : no bruit heard over the right carotid [Left Carotid Bruit] : no bruit heard over the left carotid [Bruit] : no bruit heard

## 2021-02-05 NOTE — HISTORY OF PRESENT ILLNESS
[FreeTextEntry1] : Patient is a 65-year-old man with a history of hypertension, family history of coronary artery disease, and history of rheumatic fever who presents today for follow up of blood pressure. He states that he feels tired and mildly short of breath when he runs, but not when he walks fast. He has otherwise been feeling well off of Metoprolol and he has continued on his diet. He denies any chest pain, dyspnea at rest, palpitations, headaches, or dizziness.

## 2021-04-21 ENCOUNTER — NON-APPOINTMENT (OUTPATIENT)
Age: 66
End: 2021-04-21

## 2021-04-21 LAB
PSA FREE FLD-MCNC: 41 %
PSA FREE SERPL-MCNC: 1.29 NG/ML
PSA SERPL-MCNC: 3.12 NG/ML

## 2021-04-26 ENCOUNTER — NON-APPOINTMENT (OUTPATIENT)
Age: 66
End: 2021-04-26

## 2021-09-17 ENCOUNTER — APPOINTMENT (OUTPATIENT)
Dept: INTERNAL MEDICINE | Facility: CLINIC | Age: 66
End: 2021-09-17
Payer: MEDICARE

## 2021-09-17 VITALS
WEIGHT: 128 LBS | HEIGHT: 67 IN | BODY MASS INDEX: 20.09 KG/M2 | OXYGEN SATURATION: 99 % | SYSTOLIC BLOOD PRESSURE: 130 MMHG | DIASTOLIC BLOOD PRESSURE: 84 MMHG | HEART RATE: 71 BPM | RESPIRATION RATE: 12 BRPM | TEMPERATURE: 97.5 F

## 2021-09-17 DIAGNOSIS — H61.20 IMPACTED CERUMEN, UNSPECIFIED EAR: ICD-10-CM

## 2021-09-17 PROCEDURE — 99397 PER PM REEVAL EST PAT 65+ YR: CPT

## 2021-09-17 NOTE — HISTORY OF PRESENT ILLNESS
[de-identified] : Drake Reed 66 year old male with h/o HTN, rheumatic fever presents for annual physical.\par \par He feels well, offers no complaints\par He maintains a balanced diet and remains active\par Follows with Dr. Antoni Mccormack for elevated PSA\par He lives alone independent with ADL's\par \par Denies CP, SOB, N/V, abdominal pain or exertional symptoms

## 2021-09-17 NOTE — HEALTH RISK ASSESSMENT
[No] : No [Patient reported colonoscopy was normal] : Patient reported colonoscopy was normal [Alone] : lives alone [Employed] : employed [] : No [ColonoscopyDate] : 5 years ago

## 2021-09-17 NOTE — PHYSICAL EXAM
[No Acute Distress] : no acute distress [Well Nourished] : well nourished [Well Developed] : well developed [Well-Appearing] : well-appearing [Normal Sclera/Conjunctiva] : normal sclera/conjunctiva [EOMI] : extraocular movements intact [Normal Outer Ear/Nose] : the outer ears and nose were normal in appearance [Normal Oropharynx] : the oropharynx was normal [No JVD] : no jugular venous distention [No Lymphadenopathy] : no lymphadenopathy [Supple] : supple [Thyroid Normal, No Nodules] : the thyroid was normal and there were no nodules present [No Respiratory Distress] : no respiratory distress  [No Accessory Muscle Use] : no accessory muscle use [Clear to Auscultation] : lungs were clear to auscultation bilaterally [Normal Rate] : normal rate  [Regular Rhythm] : with a regular rhythm [Normal S1, S2] : normal S1 and S2 [No Murmur] : no murmur heard [No Carotid Bruits] : no carotid bruits [Pedal Pulses Present] : the pedal pulses are present [No Edema] : there was no peripheral edema [Soft] : abdomen soft [Non Tender] : non-tender [Non-distended] : non-distended [Normal Bowel Sounds] : normal bowel sounds [Normal Posterior Cervical Nodes] : no posterior cervical lymphadenopathy [Normal Anterior Cervical Nodes] : no anterior cervical lymphadenopathy [No CVA Tenderness] : no CVA  tenderness [No Spinal Tenderness] : no spinal tenderness [No Joint Swelling] : no joint swelling [Grossly Normal Strength/Tone] : grossly normal strength/tone [No Rash] : no rash [Coordination Grossly Intact] : coordination grossly intact [No Focal Deficits] : no focal deficits [Normal Gait] : normal gait [Deep Tendon Reflexes (DTR)] : deep tendon reflexes were 2+ and symmetric [Normal Affect] : the affect was normal [Normal Insight/Judgement] : insight and judgment were intact [de-identified] : b/l cerumen impaction  [de-identified] : + Rt inguinal hernia reducible

## 2021-09-17 NOTE — PLAN
[FreeTextEntry1] : Physical annual\par see plan\par \par \par 1. Elevated PSA\par Follows with urology Dr. ALYSON Mccormack\par will check PSA \par \par 2. HTN borderline \par low sodium diet/ increased physical activity discussed\par \par 3. B/L cerumen impaction \par OTC Debrox gtts\par ENT referral \par \par 4. Right inguinal hernia\par Reducible- pt declines surgery evaluation at present\par General surgery referral \par \par Labs ordered to f/u for results

## 2021-09-22 LAB
25(OH)D3 SERPL-MCNC: 54.1 NG/ML
ALBUMIN SERPL ELPH-MCNC: 4.4 G/DL
ALP BLD-CCNC: 60 U/L
ALT SERPL-CCNC: 26 U/L
ANION GAP SERPL CALC-SCNC: 12 MMOL/L
APPEARANCE: CLEAR
AST SERPL-CCNC: 30 U/L
BILIRUB SERPL-MCNC: 0.2 MG/DL
BILIRUBIN URINE: NEGATIVE
BLOOD URINE: NEGATIVE
BUN SERPL-MCNC: 19 MG/DL
CALCIUM SERPL-MCNC: 9.4 MG/DL
CHLORIDE SERPL-SCNC: 101 MMOL/L
CHOLEST SERPL-MCNC: 172 MG/DL
CO2 SERPL-SCNC: 30 MMOL/L
COLOR: YELLOW
COVID-19 SPIKE DOMAIN ANTIBODY INTERPRETATION: POSITIVE
CREAT SERPL-MCNC: 0.8 MG/DL
ESTIMATED AVERAGE GLUCOSE: 111 MG/DL
GLUCOSE QUALITATIVE U: NEGATIVE
GLUCOSE SERPL-MCNC: 82 MG/DL
HBA1C MFR BLD HPLC: 5.5 %
HDLC SERPL-MCNC: 77 MG/DL
KETONES URINE: NEGATIVE
LDLC SERPL CALC-MCNC: 82 MG/DL
LEUKOCYTE ESTERASE URINE: NEGATIVE
NITRITE URINE: NEGATIVE
NONHDLC SERPL-MCNC: 95 MG/DL
PH URINE: 6
POTASSIUM SERPL-SCNC: 4.4 MMOL/L
PROT SERPL-MCNC: 7.2 G/DL
PROTEIN URINE: NEGATIVE
PSA SERPL-MCNC: 3.99 NG/ML
SARS-COV-2 AB SERPL IA-ACNC: >250 U/ML
SODIUM SERPL-SCNC: 143 MMOL/L
SPECIFIC GRAVITY URINE: 1.01
TRIGL SERPL-MCNC: 63 MG/DL
TSH SERPL-ACNC: 1.9 UIU/ML
UROBILINOGEN URINE: NORMAL
VIT B12 SERPL-MCNC: 728 PG/ML

## 2021-09-24 LAB — HEMOCCULT STL QL IA: NEGATIVE

## 2021-10-01 LAB
BASOPHILS # BLD AUTO: 0.06 K/UL
BASOPHILS NFR BLD AUTO: 1.2 %
EOSINOPHIL # BLD AUTO: 0.19 K/UL
EOSINOPHIL NFR BLD AUTO: 3.7 %
HCT VFR BLD CALC: 44.3 %
HGB BLD-MCNC: 13.9 G/DL
IMM GRANULOCYTES NFR BLD AUTO: 0.2 %
LYMPHOCYTES # BLD AUTO: 1.16 K/UL
LYMPHOCYTES NFR BLD AUTO: 22.5 %
MAN DIFF?: NORMAL
MCHC RBC-ENTMCNC: 30.3 PG
MCHC RBC-ENTMCNC: 31.4 GM/DL
MCV RBC AUTO: 96.7 FL
MONOCYTES # BLD AUTO: 0.47 K/UL
MONOCYTES NFR BLD AUTO: 9.1 %
NEUTROPHILS # BLD AUTO: 3.27 K/UL
NEUTROPHILS NFR BLD AUTO: 63.3 %
PLATELET # BLD AUTO: 279 K/UL
RBC # BLD: 4.58 M/UL
RBC # FLD: 13.8 %
WBC # FLD AUTO: 5.16 K/UL

## 2022-01-05 ENCOUNTER — APPOINTMENT (OUTPATIENT)
Dept: CARDIOLOGY | Facility: CLINIC | Age: 67
End: 2022-01-05
Payer: MEDICARE

## 2022-01-05 ENCOUNTER — NON-APPOINTMENT (OUTPATIENT)
Age: 67
End: 2022-01-05

## 2022-01-05 VITALS — SYSTOLIC BLOOD PRESSURE: 126 MMHG | DIASTOLIC BLOOD PRESSURE: 72 MMHG

## 2022-01-05 VITALS
TEMPERATURE: 97.1 F | SYSTOLIC BLOOD PRESSURE: 147 MMHG | BODY MASS INDEX: 19.78 KG/M2 | RESPIRATION RATE: 12 BRPM | OXYGEN SATURATION: 100 % | HEART RATE: 60 BPM | WEIGHT: 126 LBS | DIASTOLIC BLOOD PRESSURE: 79 MMHG | HEIGHT: 67 IN

## 2022-01-05 VITALS — DIASTOLIC BLOOD PRESSURE: 70 MMHG | SYSTOLIC BLOOD PRESSURE: 128 MMHG

## 2022-01-05 PROCEDURE — 93000 ELECTROCARDIOGRAM COMPLETE: CPT

## 2022-01-05 PROCEDURE — 99213 OFFICE O/P EST LOW 20 MIN: CPT | Mod: 25

## 2022-01-05 NOTE — DISCUSSION/SUMMARY
[FreeTextEntry1] : IMPRESSION: Mr. SHI is a 66 year-old man with a history of hypertension, family history of coronary artery disease, and history of rheumatic fever who presents today for follow up of his blood pressure. \par \par PLAN:\par 1. His blood pressure is well controlled today, His heart rate was on the lower side on his ECG, however, improved on exam. He will continue off of medications. \par 2. He will follow up with me in 1 year or sooner should he experience any symptoms in the interim.

## 2022-01-05 NOTE — HISTORY OF PRESENT ILLNESS
[FreeTextEntry1] : Patient is a 66-year-old man with a history of hypertension, family history of coronary artery disease, and history of rheumatic fever who presents today for follow up of blood pressure. He states that he feels well denying any chest pain, dyspnea, palpitations, headaches, or dizziness. He states that his diet has been suboptimal over the holidays.

## 2022-02-21 ENCOUNTER — INPATIENT (INPATIENT)
Facility: HOSPITAL | Age: 67
LOS: 4 days | Discharge: HOME | DRG: 682 | End: 2022-02-26
Attending: INTERNAL MEDICINE | Admitting: HOSPITALIST
Payer: MEDICARE

## 2022-02-21 VITALS
SYSTOLIC BLOOD PRESSURE: 154 MMHG | TEMPERATURE: 98 F | HEIGHT: 67 IN | WEIGHT: 134.92 LBS | HEART RATE: 78 BPM | RESPIRATION RATE: 18 BRPM | OXYGEN SATURATION: 98 % | DIASTOLIC BLOOD PRESSURE: 85 MMHG

## 2022-02-21 DIAGNOSIS — N17.9 ACUTE KIDNEY FAILURE, UNSPECIFIED: ICD-10-CM

## 2022-02-21 DIAGNOSIS — I10 ESSENTIAL (PRIMARY) HYPERTENSION: ICD-10-CM

## 2022-02-21 DIAGNOSIS — R10.9 UNSPECIFIED ABDOMINAL PAIN: ICD-10-CM

## 2022-02-21 DIAGNOSIS — Z90.89 ACQUIRED ABSENCE OF OTHER ORGANS: Chronic | ICD-10-CM

## 2022-02-21 LAB
ALBUMIN SERPL ELPH-MCNC: 4.5 G/DL — SIGNIFICANT CHANGE UP (ref 3.3–5)
ALP SERPL-CCNC: 77 U/L — SIGNIFICANT CHANGE UP (ref 40–120)
ALT FLD-CCNC: 23 U/L — SIGNIFICANT CHANGE UP (ref 10–45)
ANION GAP SERPL CALC-SCNC: 15 MMOL/L — SIGNIFICANT CHANGE UP (ref 5–17)
APPEARANCE UR: CLEAR — SIGNIFICANT CHANGE UP
AST SERPL-CCNC: 30 U/L — SIGNIFICANT CHANGE UP (ref 10–40)
BACTERIA # UR AUTO: NEGATIVE — SIGNIFICANT CHANGE UP
BASE EXCESS BLDV CALC-SCNC: 0.4 MMOL/L — SIGNIFICANT CHANGE UP (ref -2–2)
BASOPHILS # BLD AUTO: 0.02 K/UL — SIGNIFICANT CHANGE UP (ref 0–0.2)
BASOPHILS NFR BLD AUTO: 0.2 % — SIGNIFICANT CHANGE UP (ref 0–2)
BILIRUB SERPL-MCNC: 0.3 MG/DL — SIGNIFICANT CHANGE UP (ref 0.2–1.2)
BILIRUB UR-MCNC: NEGATIVE — SIGNIFICANT CHANGE UP
BUN SERPL-MCNC: 64 MG/DL — HIGH (ref 7–23)
CA-I SERPL-SCNC: 1.16 MMOL/L — SIGNIFICANT CHANGE UP (ref 1.15–1.33)
CALCIUM SERPL-MCNC: 9.6 MG/DL — SIGNIFICANT CHANGE UP (ref 8.4–10.5)
CHLORIDE BLDV-SCNC: 103 MMOL/L — SIGNIFICANT CHANGE UP (ref 96–108)
CHLORIDE SERPL-SCNC: 99 MMOL/L — SIGNIFICANT CHANGE UP (ref 96–108)
CO2 BLDV-SCNC: 28 MMOL/L — HIGH (ref 22–26)
CO2 SERPL-SCNC: 25 MMOL/L — SIGNIFICANT CHANGE UP (ref 22–31)
COLOR SPEC: SIGNIFICANT CHANGE UP
CREAT SERPL-MCNC: 5.16 MG/DL — HIGH (ref 0.5–1.3)
DIFF PNL FLD: NEGATIVE — SIGNIFICANT CHANGE UP
EOSINOPHIL # BLD AUTO: 0.03 K/UL — SIGNIFICANT CHANGE UP (ref 0–0.5)
EOSINOPHIL NFR BLD AUTO: 0.3 % — SIGNIFICANT CHANGE UP (ref 0–6)
EPI CELLS # UR: 1 /HPF — SIGNIFICANT CHANGE UP
GAS PNL BLDV: 138 MMOL/L — SIGNIFICANT CHANGE UP (ref 136–145)
GAS PNL BLDV: SIGNIFICANT CHANGE UP
GAS PNL BLDV: SIGNIFICANT CHANGE UP
GLUCOSE BLDV-MCNC: 104 MG/DL — HIGH (ref 70–99)
GLUCOSE SERPL-MCNC: 104 MG/DL — HIGH (ref 70–99)
GLUCOSE UR QL: NEGATIVE — SIGNIFICANT CHANGE UP
HCO3 BLDV-SCNC: 26 MMOL/L — SIGNIFICANT CHANGE UP (ref 22–29)
HCT VFR BLD CALC: 33.8 % — LOW (ref 39–50)
HCT VFR BLDA CALC: 33 % — LOW (ref 39–51)
HGB BLD CALC-MCNC: 11 G/DL — LOW (ref 12.6–17.4)
HGB BLD-MCNC: 11.2 G/DL — LOW (ref 13–17)
HYALINE CASTS # UR AUTO: 0 /LPF — SIGNIFICANT CHANGE UP (ref 0–2)
IMM GRANULOCYTES NFR BLD AUTO: 0.2 % — SIGNIFICANT CHANGE UP (ref 0–1.5)
KETONES UR-MCNC: NEGATIVE — SIGNIFICANT CHANGE UP
LACTATE BLDV-MCNC: 0.8 MMOL/L — SIGNIFICANT CHANGE UP (ref 0.7–2)
LEUKOCYTE ESTERASE UR-ACNC: NEGATIVE — SIGNIFICANT CHANGE UP
LIDOCAIN IGE QN: 106 U/L — HIGH (ref 7–60)
LYMPHOCYTES # BLD AUTO: 0.7 K/UL — LOW (ref 1–3.3)
LYMPHOCYTES # BLD AUTO: 8.1 % — LOW (ref 13–44)
MAGNESIUM SERPL-MCNC: 3.6 MG/DL — HIGH (ref 1.6–2.6)
MCHC RBC-ENTMCNC: 30.3 PG — SIGNIFICANT CHANGE UP (ref 27–34)
MCHC RBC-ENTMCNC: 33.1 GM/DL — SIGNIFICANT CHANGE UP (ref 32–36)
MCV RBC AUTO: 91.4 FL — SIGNIFICANT CHANGE UP (ref 80–100)
MONOCYTES # BLD AUTO: 0.63 K/UL — SIGNIFICANT CHANGE UP (ref 0–0.9)
MONOCYTES NFR BLD AUTO: 7.3 % — SIGNIFICANT CHANGE UP (ref 2–14)
NEUTROPHILS # BLD AUTO: 7.26 K/UL — SIGNIFICANT CHANGE UP (ref 1.8–7.4)
NEUTROPHILS NFR BLD AUTO: 83.9 % — HIGH (ref 43–77)
NITRITE UR-MCNC: NEGATIVE — SIGNIFICANT CHANGE UP
NRBC # BLD: 0 /100 WBCS — SIGNIFICANT CHANGE UP (ref 0–0)
PCO2 BLDV: 48 MMHG — SIGNIFICANT CHANGE UP (ref 42–55)
PH BLDV: 7.35 — SIGNIFICANT CHANGE UP (ref 7.32–7.43)
PH UR: 6 — SIGNIFICANT CHANGE UP (ref 5–8)
PHOSPHATE SERPL-MCNC: 5.2 MG/DL — HIGH (ref 2.5–4.5)
PLATELET # BLD AUTO: 313 K/UL — SIGNIFICANT CHANGE UP (ref 150–400)
PO2 BLDV: 36 MMHG — SIGNIFICANT CHANGE UP (ref 25–45)
POTASSIUM BLDV-SCNC: 5.8 MMOL/L — HIGH (ref 3.5–5.1)
POTASSIUM SERPL-MCNC: 5.7 MMOL/L — HIGH (ref 3.5–5.3)
POTASSIUM SERPL-SCNC: 5.7 MMOL/L — HIGH (ref 3.5–5.3)
PROT SERPL-MCNC: 8 G/DL — SIGNIFICANT CHANGE UP (ref 6–8.3)
PROT UR-MCNC: NEGATIVE — SIGNIFICANT CHANGE UP
RBC # BLD: 3.7 M/UL — LOW (ref 4.2–5.8)
RBC # FLD: 13.2 % — SIGNIFICANT CHANGE UP (ref 10.3–14.5)
RBC CASTS # UR COMP ASSIST: 1 /HPF — SIGNIFICANT CHANGE UP (ref 0–4)
SAO2 % BLDV: 67.3 % — SIGNIFICANT CHANGE UP (ref 67–88)
SODIUM SERPL-SCNC: 139 MMOL/L — SIGNIFICANT CHANGE UP (ref 135–145)
SP GR SPEC: 1.01 — SIGNIFICANT CHANGE UP (ref 1.01–1.02)
UROBILINOGEN FLD QL: NEGATIVE — SIGNIFICANT CHANGE UP
WBC # BLD: 8.66 K/UL — SIGNIFICANT CHANGE UP (ref 3.8–10.5)
WBC # FLD AUTO: 8.66 K/UL — SIGNIFICANT CHANGE UP (ref 3.8–10.5)
WBC UR QL: 4 /HPF — SIGNIFICANT CHANGE UP (ref 0–5)

## 2022-02-21 PROCEDURE — 99291 CRITICAL CARE FIRST HOUR: CPT

## 2022-02-21 PROCEDURE — 99223 1ST HOSP IP/OBS HIGH 75: CPT

## 2022-02-21 PROCEDURE — 74176 CT ABD & PELVIS W/O CONTRAST: CPT | Mod: 26,MA

## 2022-02-21 PROCEDURE — 93010 ELECTROCARDIOGRAM REPORT: CPT

## 2022-02-21 RX ORDER — SENNA PLUS 8.6 MG/1
2 TABLET ORAL AT BEDTIME
Refills: 0 | Status: DISCONTINUED | OUTPATIENT
Start: 2022-02-21 | End: 2022-02-26

## 2022-02-21 RX ORDER — POLYETHYLENE GLYCOL 3350 17 G/17G
17 POWDER, FOR SOLUTION ORAL DAILY
Refills: 0 | Status: DISCONTINUED | OUTPATIENT
Start: 2022-02-21 | End: 2022-02-24

## 2022-02-21 RX ORDER — CALCIUM GLUCONATE 100 MG/ML
1 VIAL (ML) INTRAVENOUS ONCE
Refills: 0 | Status: COMPLETED | OUTPATIENT
Start: 2022-02-21 | End: 2022-02-21

## 2022-02-21 RX ORDER — ONDANSETRON 8 MG/1
4 TABLET, FILM COATED ORAL EVERY 8 HOURS
Refills: 0 | Status: DISCONTINUED | OUTPATIENT
Start: 2022-02-21 | End: 2022-02-26

## 2022-02-21 RX ORDER — SODIUM ZIRCONIUM CYCLOSILICATE 10 G/10G
10 POWDER, FOR SUSPENSION ORAL ONCE
Refills: 0 | Status: COMPLETED | OUTPATIENT
Start: 2022-02-21 | End: 2022-02-21

## 2022-02-21 RX ORDER — LANOLIN ALCOHOL/MO/W.PET/CERES
3 CREAM (GRAM) TOPICAL AT BEDTIME
Refills: 0 | Status: DISCONTINUED | OUTPATIENT
Start: 2022-02-21 | End: 2022-02-26

## 2022-02-21 RX ORDER — DEXTROSE 50 % IN WATER 50 %
50 SYRINGE (ML) INTRAVENOUS ONCE
Refills: 0 | Status: COMPLETED | OUTPATIENT
Start: 2022-02-21 | End: 2022-02-21

## 2022-02-21 RX ORDER — INSULIN HUMAN 100 [IU]/ML
5 INJECTION, SOLUTION SUBCUTANEOUS ONCE
Refills: 0 | Status: COMPLETED | OUTPATIENT
Start: 2022-02-21 | End: 2022-02-21

## 2022-02-21 RX ORDER — ACETAMINOPHEN 500 MG
650 TABLET ORAL EVERY 6 HOURS
Refills: 0 | Status: DISCONTINUED | OUTPATIENT
Start: 2022-02-21 | End: 2022-02-26

## 2022-02-21 RX ADMIN — SODIUM ZIRCONIUM CYCLOSILICATE 10 GRAM(S): 10 POWDER, FOR SUSPENSION ORAL at 21:29

## 2022-02-21 RX ADMIN — Medication 100 GRAM(S): at 21:28

## 2022-02-21 RX ADMIN — Medication 50 MILLILITER(S): at 21:28

## 2022-02-21 RX ADMIN — INSULIN HUMAN 5 UNIT(S): 100 INJECTION, SOLUTION SUBCUTANEOUS at 21:27

## 2022-02-21 NOTE — ED ADULT NURSE NOTE - OBJECTIVE STATEMENT
Pt is an ambulatory 66 yr old male c/o N/V and abdominal pain since thursday which he attributes to moldy blueberries.  Also c/o constipation and his "Penis Leaking" when he strains to urinate.  PERRL wnl, parks with equal strength.  Denies chest pain or sob.  Denies fevers, chills c/o N/V and abdominal pain.  Abdomen NT ND.  C/o dysuria, no hematuria.  Endorses one renal stone in the past.  Peripheral pulses +2bl no edema

## 2022-02-21 NOTE — H&P ADULT - NSHPPHYSICALEXAM_GEN_ALL_CORE
Vital Signs Last 24 Hrs  T(C): 36.9 (21 Feb 2022 18:26), Max: 36.9 (21 Feb 2022 18:26)  T(F): 98.4 (21 Feb 2022 18:26), Max: 98.4 (21 Feb 2022 18:26)  HR: 74 (21 Feb 2022 18:26) (74 - 78)  BP: 161/91 (21 Feb 2022 18:26) (154/85 - 161/91)  BP(mean): --  RR: 18 (21 Feb 2022 18:26) (18 - 18)  SpO2: 98% (21 Feb 2022 18:26) (98% - 98%) Vital Signs Last 24 Hrs  T(C): 36.9 (21 Feb 2022 18:26), Max: 36.9 (21 Feb 2022 18:26)  T(F): 98.4 (21 Feb 2022 18:26), Max: 98.4 (21 Feb 2022 18:26)  HR: 74 (21 Feb 2022 18:26) (74 - 78)  BP: 161/91 (21 Feb 2022 18:26) (154/85 - 161/91)  BP(mean): --  RR: 18 (21 Feb 2022 18:26) (18 - 18)  SpO2: 98% (21 Feb 2022 18:26) (98% - 98%)    GENERAL: moderate  distress from pain , thin cachectic appearing   HEAD:  Atraumatic, bitemporal wasting   ENT: EOMI, PERRLA, conjunctiva and sclera clear,  moist mucosa no pharyngeal erythema or exudates   NECK: supple , no JVD   CHEST/LUNG: Clear to auscultation bilaterally; No wheeze, equal breath sounds bilaterally   BACK: No spinal tenderness,  No CVA tenderness   HEART: Regular rate and rhythm; No murmurs, rubs, or gallops  ABDOMEN: firm, Nontender, mildly distended lower abd ; Bowel sounds present  EXTREMITIES:  No clubbing, cyanosis,  trace edema  MSK: No joint swelling or effusions, ROM intact   PSYCH: Normal behavior/affect  NEUROLOGY: AAOx3, non-focal, cranial nerves intact  SKIN: petechia on b/l LE , scabbed lesions on b/l legs , Normal color, No rashes or open lesions

## 2022-02-21 NOTE — ED PROVIDER NOTE - PHYSICAL EXAMINATION
Constitutional: resting in bed with no acute distress  HEENT: moist mucous membranes, PERRLA bilaterally, no cervical lymphadenopathy  Neuro: AAOx3, grossly intact   Respiratory:  lung sounds clear to auscultation bilaterally, unlabored breathing  Cardiovascular: regular rate and rhythm, normal S1 and S2, no murmurs, rubs or gallops  Gastrointestinal: Abdomen soft, non distended, non tenderness, active bowel sounds  Extremities:  No edema, no calf tenderness, pulses palpable throughout  Skin: Non-jaundiced, no cyanosis or rash observed, Constitutional: resting in bed with no acute distress  HEENT: moist mucous membranes, no cervical lymphadenopathy  Neuro: grossly intact   Respiratory:  lung sounds clear to auscultation bilaterally, unlabored breathing  Cardiovascular: regular rate and rhythm, normal S1 and S2, no murmurs, rubs or gallops  Gastrointestinal: Abdomen firm, non distended, non tender  Back: no CVA tenderness  Extremities:  No edema, no calf tenderness, pulses palpable throughout  Skin: crusted blood on L shin, no warmth, purulence or drainage, non-jaundiced, no cyanosis or rash observed

## 2022-02-21 NOTE — ED PROVIDER NOTE - PROGRESS NOTE DETAILS
Smith: pt with large mass appx 2cm from anal verge on rectal exam, not c/w fecal impaction, unable to characterize full size of it but no tenderness appreciated, no bleeding, unable to remove any stool behind it. ap - pt informed that he has large stool burden w/ obstruction c

## 2022-02-21 NOTE — ED ADULT NURSE REASSESSMENT NOTE - NS ED NURSE REASSESS COMMENT FT1
14F Simmons catheter inserted using sterile technique. Second RN present to confirm sterility. Bedside drainage to gravity. Stat lock in place, secured to upper thigh. Initial output approx 500cc yellow urine.

## 2022-02-21 NOTE — ED PROVIDER NOTE - ATTENDING CONTRIBUTION TO CARE
66 M w/ htn here w/ abd pain constipation for 1 week w/ generalized weakness, dec po intake, no fevers, no chills, pt states last had a colonoscopy approx 7 years ago, pt w/ no trauma I have personally seen and examined this patient. I have fully participated in the care of this patient. I have made amendments to the documentation where appropriate and otherwise agree with the history, physical exam, and plan as documented by the Resident + Medical student.      66 M w/ htn here w/ abd pain constipation for 1 week w/ generalized weakness, dec po intake, nweight loss, pt w/ findings concerning for possible maliginancy gen abd tenderness, plan for labs and ct, pt found to be w/ elevated creatinine, pt reports he is urinating but only a little +urgency, UA obtained neg for infection, pt w/ firm abdomen, pending ct scan, found to have large stool burden w/ hydronephrosis, given K lowering therapy admitted on telemetry. Plan for admission, creatinine trending coon placed in the ER. to eval for obsttruction and ensure cr improve.

## 2022-02-21 NOTE — H&P ADULT - PROBLEM SELECTOR PLAN 1
suspect obstructive , b/l hydronephrosis on CT  also noted enlarged prostate ,s/p coon , UA neg   - maintain coon   -  consult - to be arranged by day team   - s/p hyperkalemia treatment , monitor K   - urine Na and creatinine for FeNa suspect obstructive , b/l hydronephrosis on CT  also noted enlarged prostate ,s/p coon , UA neg   - maintain coon   -  consult - to be arranged by day team   - s/p hyperkalemia treatment , monitor K   - urine Na and creatinine for FeNa  - strict ins and outs   - avoid nephrotoxins   - monitor creatinine

## 2022-02-21 NOTE — ED PROVIDER NOTE - CLINICAL SUMMARY MEDICAL DECISION MAKING FREE TEXT BOX
66 M w/ hx of HTN here w/ abd pain and constipation for 1 week w/ associated gen weakness and weight loss w/ dec po intake, no f/c/n/v. pt states he has had a colonoscopy approx 7 years ago.   Const: no acute distress, Well-developed, Eyes: no conjunctival injection and no scleral icterus ENMT: Moist mucus membranes, CVS: +S1/S2, radial/DP pulse 2+ bilaterally RESP: Unlabored respiratory effort, Clear to auscultation bilaterally GI: Nontender/Nondistended soft abdomen, no CVA tenderness MSK: Extremities w/o deformity or ttp, Psych: Awake, Alert, & Orientedx3;  Appropriate mood and affect, cooperative Plan for labs imaging and reassessment. found to be w/ NICOLETTE and concern for possible malignancy vs stool burden.

## 2022-02-21 NOTE — H&P ADULT - NSHPLABSRESULTS_GEN_ALL_CORE
Labs personally reviewed:                          11.2   8.66  )-----------( 313      ( 2022 18:53 )             33.8         139  |  99  |  64<H>  ----------------------------<  104<H>  5.7<H>   |  25  |  5.16<H>    Ca    9.6      2022 18:53  Phos  5.2       Mg     3.6         TPro  8.0  /  Alb  4.5  /  TBili  0.3  /  DBili  x   /  AST  30  /  ALT  23  /  AlkPhos  77          LIVER FUNCTIONS - ( 2022 18:53 )  Alb: 4.5 g/dL / Pro: 8.0 g/dL / ALK PHOS: 77 U/L / ALT: 23 U/L / AST: 30 U/L / GGT: x             Urinalysis Basic - ( 2022 18:51 )    Color: Light Yellow / Appearance: Clear / S.010 / pH: x  Gluc: x / Ketone: Negative  / Bili: Negative / Urobili: Negative   Blood: x / Protein: Negative / Nitrite: Negative   Leuk Esterase: Negative / RBC: 1 /hpf / WBC 4 /HPF   Sq Epi: x / Non Sq Epi: 1 /hpf / Bacteria: Negative      CAPILLARY BLOOD GLUCOSE      POCT Blood Glucose.: 104 mg/dL (2022 21:11)      Imaging:  CT abdomen pelvis:   1. Mild to moderate bilateral hydroureteronephrosis to the level of   bladder. Markedly distended bladder with mild wall thickening.   Correlation with urinalysis is recommended to evaluate for urinary tract   infection.  2. Large colonic stool burden including mild distention of the ascending   and transverse colon due to extensive fecal burden.    EKG personally reviewed: sinus miguelina at 59 bpm , qtc 394

## 2022-02-21 NOTE — ED PROVIDER NOTE - OBJECTIVE STATEMENT
66yr old male w/ hx of HTN presents w/ 1 week of ABD pain, constipation and decreased appetite. ABD pain is of the b/l lower quadrants, no radiation, he did not take anything for the pain. BMs are small in volume and last BM was a few days ago. Associated with urinary incontinence during straining and at night. Denies blood in the stool. Denies fever, chills, weight loss, night sweats, SOB, CP, vomiting and dysuria. 66yr old male w/ hx of HTN presents w/ 1 week of ABD pain, constipation and urinary incontinence. ABD pain is of the b/l lower quadrants, no radiation, he did not take anything for the pain. BMs are small in volume and last BM was a few days ago. Did not take anything for constipation. Associated with urinary incontinence during BM straining and at night. Admits to decreased appetite. Denies blood in the stool. Denies fever, chills, weight loss, night sweats, SOB, CP, vomiting and dysuria. Able to ambulate.

## 2022-02-21 NOTE — H&P ADULT - NSICDXFAMILYHX_GEN_ALL_CORE_FT
FAMILY HISTORY:  Father  Still living? Unknown  FH: bladder cancer, Age at diagnosis: Age Unknown    Mother  Still living? Unknown  FH: CAD (coronary artery disease), Age at diagnosis: Age Unknown

## 2022-02-21 NOTE — H&P ADULT - HISTORY OF PRESENT ILLNESS
Patient is a 66 year old male w/phx of HTN presents for abdominal pain over the past week.    Patient is a 66 year old male w/phx of HTN presents for abdominal pain over the past week.   Patient reports diffuse bilateral lower abdominal pain, non radiating , associated with constipation  as well as urinary incontinence while straining.  Did not take anything for pain , no specific exacerbating factors. He reports nausea or vomiting , but does have decreased appetite and po intake. He has no fever or chills .

## 2022-02-21 NOTE — H&P ADULT - NSHPREVIEWOFSYSTEMS_GEN_ALL_CORE
CONSTITUTIONAL:+  weakness, no fevers or chills  EYES/ENT: No visual changes;  No dysphagia  NECK: No pain or stiffness  RESPIRATORY: No cough, wheezing, hemoptysis; No shortness of breath  CARDIOVASCULAR: No chest pain or palpitations; No lower extremity edema  EXTREMITIES: no le edema, cyanosis, clubbing  GASTROINTESTINAL: + abdominal pain. + nausea, no vomiting,  no  hematemesis; No diarrhea + constipation. No melena or hematochezia.  BACK: No back pain  GENITOURINARY: No dysuria, frequency or hematuria  NEUROLOGICAL: No numbness or weakness  MSK: no joint swelling or pain  SKIN: No itching, burning, rashes, or lesions   PSYCH: no agitation  All other review of systems is negative unless indicated above.

## 2022-02-21 NOTE — H&P ADULT - PROBLEM SELECTOR PLAN 2
large stool burden on imaging w/o obstruction , possible rectal mass on  ED exam , c/w presentation , can be contributing to NICOLETTE   - Miralax/ senna  - dulcolax suppository   - Tylenol PRN for pain, If not improving , can give tramadol   - GI consult - emailed - day team to f/u  recommendations

## 2022-02-22 LAB
ALBUMIN SERPL ELPH-MCNC: 3.8 G/DL — SIGNIFICANT CHANGE UP (ref 3.3–5)
ALP SERPL-CCNC: 66 U/L — SIGNIFICANT CHANGE UP (ref 40–120)
ALT FLD-CCNC: 18 U/L — SIGNIFICANT CHANGE UP (ref 10–45)
ANION GAP SERPL CALC-SCNC: 11 MMOL/L — SIGNIFICANT CHANGE UP (ref 5–17)
ANION GAP SERPL CALC-SCNC: 15 MMOL/L — SIGNIFICANT CHANGE UP (ref 5–17)
AST SERPL-CCNC: 22 U/L — SIGNIFICANT CHANGE UP (ref 10–40)
BASOPHILS # BLD AUTO: 0.03 K/UL — SIGNIFICANT CHANGE UP (ref 0–0.2)
BASOPHILS NFR BLD AUTO: 0.4 % — SIGNIFICANT CHANGE UP (ref 0–2)
BILIRUB SERPL-MCNC: 0.3 MG/DL — SIGNIFICANT CHANGE UP (ref 0.2–1.2)
BUN SERPL-MCNC: 64 MG/DL — HIGH (ref 7–23)
BUN SERPL-MCNC: 65 MG/DL — HIGH (ref 7–23)
CA-I BLD-SCNC: 1.18 MMOL/L — SIGNIFICANT CHANGE UP (ref 1.15–1.33)
CALCIUM SERPL-MCNC: 9.4 MG/DL — SIGNIFICANT CHANGE UP (ref 8.4–10.5)
CALCIUM SERPL-MCNC: 9.9 MG/DL — SIGNIFICANT CHANGE UP (ref 8.4–10.5)
CHLORIDE SERPL-SCNC: 101 MMOL/L — SIGNIFICANT CHANGE UP (ref 96–108)
CHLORIDE SERPL-SCNC: 103 MMOL/L — SIGNIFICANT CHANGE UP (ref 96–108)
CO2 SERPL-SCNC: 23 MMOL/L — SIGNIFICANT CHANGE UP (ref 22–31)
CO2 SERPL-SCNC: 27 MMOL/L — SIGNIFICANT CHANGE UP (ref 22–31)
CREAT SERPL-MCNC: 4.53 MG/DL — HIGH (ref 0.5–1.3)
CREAT SERPL-MCNC: 5.07 MG/DL — HIGH (ref 0.5–1.3)
CULTURE RESULTS: SIGNIFICANT CHANGE UP
EOSINOPHIL # BLD AUTO: 0.03 K/UL — SIGNIFICANT CHANGE UP (ref 0–0.5)
EOSINOPHIL NFR BLD AUTO: 0.4 % — SIGNIFICANT CHANGE UP (ref 0–6)
FLUAV AG NPH QL: SIGNIFICANT CHANGE UP
FLUBV AG NPH QL: SIGNIFICANT CHANGE UP
GLUCOSE SERPL-MCNC: 82 MG/DL — SIGNIFICANT CHANGE UP (ref 70–99)
GLUCOSE SERPL-MCNC: 96 MG/DL — SIGNIFICANT CHANGE UP (ref 70–99)
HCT VFR BLD CALC: 31.1 % — LOW (ref 39–50)
HCV AB S/CO SERPL IA: 0.12 S/CO — SIGNIFICANT CHANGE UP (ref 0–0.99)
HCV AB SERPL-IMP: SIGNIFICANT CHANGE UP
HGB BLD-MCNC: 10.4 G/DL — LOW (ref 13–17)
IMM GRANULOCYTES NFR BLD AUTO: 0.4 % — SIGNIFICANT CHANGE UP (ref 0–1.5)
LYMPHOCYTES # BLD AUTO: 0.75 K/UL — LOW (ref 1–3.3)
LYMPHOCYTES # BLD AUTO: 10.3 % — LOW (ref 13–44)
MCHC RBC-ENTMCNC: 30.2 PG — SIGNIFICANT CHANGE UP (ref 27–34)
MCHC RBC-ENTMCNC: 33.4 GM/DL — SIGNIFICANT CHANGE UP (ref 32–36)
MCV RBC AUTO: 90.4 FL — SIGNIFICANT CHANGE UP (ref 80–100)
MONOCYTES # BLD AUTO: 0.68 K/UL — SIGNIFICANT CHANGE UP (ref 0–0.9)
MONOCYTES NFR BLD AUTO: 9.3 % — SIGNIFICANT CHANGE UP (ref 2–14)
NEUTROPHILS # BLD AUTO: 5.77 K/UL — SIGNIFICANT CHANGE UP (ref 1.8–7.4)
NEUTROPHILS NFR BLD AUTO: 79.2 % — HIGH (ref 43–77)
NRBC # BLD: 0 /100 WBCS — SIGNIFICANT CHANGE UP (ref 0–0)
PLATELET # BLD AUTO: 307 K/UL — SIGNIFICANT CHANGE UP (ref 150–400)
POTASSIUM SERPL-MCNC: 4.6 MMOL/L — SIGNIFICANT CHANGE UP (ref 3.5–5.3)
POTASSIUM SERPL-MCNC: 5.2 MMOL/L — SIGNIFICANT CHANGE UP (ref 3.5–5.3)
POTASSIUM SERPL-SCNC: 4.6 MMOL/L — SIGNIFICANT CHANGE UP (ref 3.5–5.3)
POTASSIUM SERPL-SCNC: 5.2 MMOL/L — SIGNIFICANT CHANGE UP (ref 3.5–5.3)
PROT SERPL-MCNC: 6.8 G/DL — SIGNIFICANT CHANGE UP (ref 6–8.3)
RBC # BLD: 3.44 M/UL — LOW (ref 4.2–5.8)
RBC # FLD: 13.2 % — SIGNIFICANT CHANGE UP (ref 10.3–14.5)
RSV RNA NPH QL NAA+NON-PROBE: SIGNIFICANT CHANGE UP
SARS-COV-2 RNA SPEC QL NAA+PROBE: SIGNIFICANT CHANGE UP
SODIUM SERPL-SCNC: 139 MMOL/L — SIGNIFICANT CHANGE UP (ref 135–145)
SODIUM SERPL-SCNC: 141 MMOL/L — SIGNIFICANT CHANGE UP (ref 135–145)
SPECIMEN SOURCE: SIGNIFICANT CHANGE UP
WBC # BLD: 7.29 K/UL — SIGNIFICANT CHANGE UP (ref 3.8–10.5)
WBC # FLD AUTO: 7.29 K/UL — SIGNIFICANT CHANGE UP (ref 3.8–10.5)

## 2022-02-22 PROCEDURE — 99222 1ST HOSP IP/OBS MODERATE 55: CPT

## 2022-02-22 PROCEDURE — 99233 SBSQ HOSP IP/OBS HIGH 50: CPT

## 2022-02-22 PROCEDURE — 99222 1ST HOSP IP/OBS MODERATE 55: CPT | Mod: GC

## 2022-02-22 RX ORDER — SOD SULF/SODIUM/NAHCO3/KCL/PEG
4000 SOLUTION, RECONSTITUTED, ORAL ORAL ONCE
Refills: 0 | Status: COMPLETED | OUTPATIENT
Start: 2022-02-22 | End: 2022-02-22

## 2022-02-22 RX ORDER — TAMSULOSIN HYDROCHLORIDE 0.4 MG/1
0.4 CAPSULE ORAL AT BEDTIME
Refills: 0 | Status: DISCONTINUED | OUTPATIENT
Start: 2022-02-22 | End: 2022-02-26

## 2022-02-22 RX ADMIN — TAMSULOSIN HYDROCHLORIDE 0.4 MILLIGRAM(S): 0.4 CAPSULE ORAL at 23:38

## 2022-02-22 RX ADMIN — Medication 650 MILLIGRAM(S): at 08:00

## 2022-02-22 RX ADMIN — Medication 4000 MILLILITER(S): at 23:34

## 2022-02-22 RX ADMIN — SENNA PLUS 2 TABLET(S): 8.6 TABLET ORAL at 23:38

## 2022-02-22 RX ADMIN — Medication 650 MILLIGRAM(S): at 01:47

## 2022-02-22 RX ADMIN — POLYETHYLENE GLYCOL 3350 17 GRAM(S): 17 POWDER, FOR SOLUTION ORAL at 01:47

## 2022-02-22 RX ADMIN — POLYETHYLENE GLYCOL 3350 17 GRAM(S): 17 POWDER, FOR SOLUTION ORAL at 13:58

## 2022-02-22 RX ADMIN — SENNA PLUS 2 TABLET(S): 8.6 TABLET ORAL at 01:47

## 2022-02-22 NOTE — CONSULT NOTE ADULT - ASSESSMENT
Impression:  # abd pain - likely 2/2 hydronephrotosis +/- constipation, being seen by urology, s/p coon  # constipation - likely slow transit constipation, no h/o pelvic floor dysfunction. Will increase laxatives, and recommend outpt f/u    Recommendations:  - f/u urology recs  - SMOG enema x 2  - please order golytely 4L to patients bedside, should drink throughout the day until patient having multiple bowel movements  - outpatient GI follow up    GI will continue to follow.     All recommendations are tentative until note is attested by attending.     Trino Rabago, PGY5  Gastroenterology/Hepatology Fellow  Available on [employer] Teams  [phone] ([facility] Short Range Pager)  [phone] (Long Range Pager)    After 5pm, please contact the on-call GI fellow. [phone]   Impression:  # abd pain - likely 2/2 hydronephrotosis +/- constipation, being seen by urology, s/p coon  # constipation - likely slow transit constipation, no h/o pelvic floor dysfunction. Will increase laxatives, and recommend outpt f/u    Recommendations:  - f/u urology recs  - SMOG enema x 2  - please order golytely 4L to patients bedside, should drink throughout the day until patient having multiple bowel movements  - outpatient GI follow up for eventual colonoscopy    GI will continue to follow.     All recommendations are tentative until note is attested by attending.     Trino Rabago, PGY5  Gastroenterology/Hepatology Fellow  Available on [employer] Teams  [phone] ([facility] Short Range Pager)  [phone] (Long Range Pager)    After 5pm, please contact the on-call GI fellow. [phone]

## 2022-02-22 NOTE — CONSULT NOTE ADULT - SUBJECTIVE AND OBJECTIVE BOX
HPI:  Patient is a 66y Male with PMH of BPH and hx of elevated PSA who presented with one week of progressive worsening lower abd pain, bloating, constipation, poor appetite and worsening LUTS. Pt at baseline with 5x nocturia and double voiding of a weak stream. This week is urinating every 30 mins small quantities and nocturnal incontinence which is what brought him to the ER.    Denies fever/ chills/ flank pain/ hematuria/ past retention   Coon placed overnight with documentation of 500cc as uop. Per todays RN has needed coon bag emptied frequently due to volume.     Has seen Dr. Antoni Mccormack due to elevated PSA.   from: MR 3D Reconstruct w/ Workstation (20 @ 09:21) >    65 cc gland with mass effect on the bladder. No MRI suspicious lesion.        PAST MEDICAL & SURGICAL HISTORY:  Hypertension    H/O: rheumatic fever    S/P tonsillectomy      FAMILY HISTORY:  FH: bladder cancer (Father)    FH: CAD (coronary artery disease) (Mother)     No known  malignancy     SOCIAL HISTORY: Retired     MEDICATIONS  (STANDING):  polyethylene glycol 3350 17 Gram(s) Oral daily  senna 2 Tablet(s) Oral at bedtime    MEDICATIONS  (PRN):  acetaminophen     Tablet .. 650 milliGRAM(s) Oral every 6 hours PRN Temp greater or equal to 38C (100.4F), Mild Pain (1 - 3)  aluminum hydroxide/magnesium hydroxide/simethicone Suspension 30 milliLiter(s) Oral every 4 hours PRN Dyspepsia  bisacodyl Suppository 10 milliGRAM(s) Rectal daily PRN Constipation  melatonin 3 milliGRAM(s) Oral at bedtime PRN Insomnia  ondansetron Injectable 4 milliGRAM(s) IV Push every 8 hours PRN Nausea and/or Vomiting    Allergies    No Known Allergies    Intolerances    REVIEW OF SYSTEMS: Pertinent positives and negatives as stated in HPI, otherwise negative    Vital signs  T(C): 36.6 (22 @ 05:15), Max: 37.1 (22 @ 23:30)  HR: 61 (22 @ 05:15)  BP: 122/60 (22 @ 05:15)  SpO2: 100% (22 @ 05:15)  Wt(kg): --    Physical Exam    Gen: awake alert NAD    HEENT: normocephalic, atraumatic, no scleral icterus    Pulm: No respiratory distress, no subcostal retractions, no accessory muscle use     CV: S1 S2,    Abd: Soft, NT, ND,    : circ phallus coon in place urine red tinged     MSK:  No CVAT bl   Moving all extremities, full ROM in all extremities, No edema present    NEURO: A&Ox3, no focal neurological deficits, CN 2-12 grossly intact        LABS:                              10.4   7.29  )-----------( 307      ( 2022 07:16 )             31.1           141  |  103  |  64<H>  ----------------------------<  82  5.2   |  27  |  4.53<H>    Ca    9.4      2022 07:16  Phos  5.2       Mg     3.6         TPro  6.8  /  Alb  3.8  /  TBili  0.3  /  DBili  x   /  AST  22  /  ALT  18  /  AlkPhos  66        Urinalysis Basic - ( 2022 18:51 )    Color: Light Yellow / Appearance: Clear / S.010 / pH: x  Gluc: x / Ketone: Negative  / Bili: Negative / Urobili: Negative   Blood: x / Protein: Negative / Nitrite: Negative   Leuk Esterase: Negative / RBC: 1 /hpf / WBC 4 /HPF   Sq Epi: x / Non Sq Epi: 1 /hpf / Bacteria: Negative        Urine Cx: sent   Radiology:  < from: CT Abdomen and Pelvis No Cont (22 @ 20:45) >  FINDINGS:  Evaluation of solid organs and vascular structures is limited without   intravenous contrast.    LOWER CHEST: Trace pericardial effusion.    LIVER: Within normal limits.  BILE DUCTS: Normal caliber.  GALLBLADDER: Within normal limits.  SPLEEN: Within normal limits.  PANCREAS: Within normal limits.  ADRENALS: Within normallimits.  KIDNEYS/URETERS: Mild to moderate bilateral hydroureteronephrosis the   level of the bladder without obstructing stone identified. Right renal   cyst. Bilateral nonobstructing renal calculi measuring up to 4 mm.    BLADDER: Markedly distended. Mild wall thickening.  REPRODUCTIVE ORGANS: Enlarged prostate.    BOWEL: No bowel obstruction. Appendix is not visualized. No evidence of   inflammation in the pericecal region. Large colonic stool burden   including mildly distended ascending and transverse colon due to fecal   burden.  PERITONEUM: Trace free pelvic fluid.  VESSELS: Atherosclerotic changes.  RETROPERITONEUM/LYMPH NODES: No lymphadenopathy.  ABDOMINAL WALL: Within normal limits.  BONES: Degenerative changes. L3 vertebral body hemangioma.    IMPRESSION:    1. Mild to moderate bilateral hydroureteronephrosis to the level of   bladder. Markedly distended bladder with mild wall thickening.   Correlation with urinalysis is recommended to evaluate for urinary tract   infection.  2. Large colonic stool burden including mild distention of the ascending   and transverse colon due to extensive fecal burden.    < end of copied text >

## 2022-02-22 NOTE — CONSULT NOTE ADULT - SUBJECTIVE AND OBJECTIVE BOX
HPI:  Patient is a 66 year old male w/phx of HTN presents for abdominal pain over the past week. No h/o GI problems. Usually takes OTC fiber (psyllium, flax seeds) however over the past few weeks has changed diet due to dental procedure. For the past 1 week, has reported  diffuse bilateral lower abdominal pain, non radiating , associated with constipation  as well as urinary incontinence while straining.  Did not take anything for pain , no specific exacerbating factors. He reports nausea or vomiting , but does have decreased appetite and po intake. He has no fever or chills .  During this time having some urinary incontinence overnight. Also w/ small amounts of liquid stool. Last colonoscopy 7 years ago w/ inadequate prep.     On admission, patient HDS. Exam w/ c/f rectal mass. CT w/o rectal mass. CT showing bilateral hydro, large stool burden, and c/f cystitis.     Allergies:  No Known Allergies      Home Medications:    Hospital Medications:  acetaminophen     Tablet .. 650 milliGRAM(s) Oral every 6 hours PRN  aluminum hydroxide/magnesium hydroxide/simethicone Suspension 30 milliLiter(s) Oral every 4 hours PRN  bisacodyl Suppository 10 milliGRAM(s) Rectal daily PRN  melatonin 3 milliGRAM(s) Oral at bedtime PRN  ondansetron Injectable 4 milliGRAM(s) IV Push every 8 hours PRN  polyethylene glycol 3350 17 Gram(s) Oral daily  senna 2 Tablet(s) Oral at bedtime  tamsulosin 0.4 milliGRAM(s) Oral at bedtime      PMHX/PSHX:  Hypertension    H/O: rheumatic fever    S/P tonsillectomy        Family history:  FH: bladder cancer (Father)    FH: CAD (coronary artery disease) (Mother)        Denies family history of colon cancer/polyps, stomach cancer/polyps, pancreatic cancer/masses, liver cancer/disease, ovarian cancer and endometrial cancer.    Social History:   Tob: Denies  EtOH: Denies  Illicit Drugs: Denies    ROS:     General:  No wt loss, fevers, chills, night sweats, fatigue  Eyes:  Good vision, no reported pain  ENT:  No sore throat, pain, runny nose, dysphagia  CV:  No pain, palpitations, hypo/hypertension  Pulm:  No dyspnea, cough, tachypnea, wheezing  GI:  see HPI  :  No pain, bleeding, incontinence, nocturia  Muscle:  No pain, weakness  Neuro:  No weakness, tingling, memory problems  Psych:  No fatigue, insomnia, mood problems, depression  Endocrine:  No polyuria, polydipsia, cold/heat intolerance  Heme:  No petechiae, ecchymosis, easy bruisability  Skin:  No rash, tattoos, scars, edema    PHYSICAL EXAM:     GENERAL:  No acute distress  HEENT:  NCAT, no scleral icterus   CHEST:  no respiratory distress  HEART:  Regular rate and rhythm  ABDOMEN:  Soft, non-tender, non-distended, normoactive bowel sounds,  no masses  Rectal: large firm anterior mass, no stool  EXTREMITIES: No edema  SKIN:  No rash/erythema/ecchymoses/petechiae/wounds/abscess/warm/dry  NEURO:  Alert and oriented x 3, no asterixis    Vital Signs:  Vital Signs Last 24 Hrs  T(C): 36.5 (2022 11:42), Max: 37.1 (2022 23:30)  T(F): 97.7 (2022 11:42), Max: 98.7 (2022 23:30)  HR: 66 (2022 11:42) (61 - 80)  BP: 116/66 (2022 11:42) (116/66 - 170/90)  BP(mean): --  RR: 18 (2022 11:42) (17 - 22)  SpO2: 98% (2022 11:42) (97% - 100%)  Daily Height in cm: 170.18 (2022 17:21)    Daily     LABS:                        10.4   7.29  )-----------( 307      ( 2022 07:16 )             31.1     Mean Cell Volume: 90.4 fl (- @ 07:16)        141  |  103  |  64<H>  ----------------------------<  82  5.2   |  27  |  4.53<H>    Ca    9.4      2022 07:16  Phos  5.2       Mg     3.6         TPro  6.8  /  Alb  3.8  /  TBili  0.3  /  DBili  x   /  AST  22  /  ALT  18  /  AlkPhos  66  02-22    LIVER FUNCTIONS - ( 2022 07:16 )  Alb: 3.8 g/dL / Pro: 6.8 g/dL / ALK PHOS: 66 U/L / ALT: 18 U/L / AST: 22 U/L / GGT: x             Urinalysis Basic - ( 2022 18:51 )    Color: Light Yellow / Appearance: Clear / S.010 / pH: x  Gluc: x / Ketone: Negative  / Bili: Negative / Urobili: Negative   Blood: x / Protein: Negative / Nitrite: Negative   Leuk Esterase: Negative / RBC: 1 /hpf / WBC 4 /HPF   Sq Epi: x / Non Sq Epi: 1 /hpf / Bacteria: Negative      Amylase Serum--      Lipase [id number]       Ammonia--                          10.4   7.29  )-----------( 307      ( 2022 07:16 )             31.1                         11.2   8.66  )-----------( 313      ( 2022 18:53 )             33.8       Imaging:  CT A/P 22  FINDINGS:  Evaluation of solid organs and vascular structures is limited without   intravenous contrast.    LOWER CHEST: Trace pericardial effusion.    LIVER: Within normal limits.  BILE DUCTS: Normal caliber.  GALLBLADDER: Within normal limits.  SPLEEN: Within normal limits.  PANCREAS: Within normal limits.  ADRENALS: Within normallimits.  KIDNEYS/URETERS: Mild to moderate bilateral hydroureteronephrosis the   level of the bladder without obstructing stone identified. Right renal   cyst. Bilateral nonobstructing renal calculi measuring up to 4 mm.    BLADDER: Markedly distended. Mild wall thickening.  REPRODUCTIVE ORGANS: Enlarged prostate.    BOWEL: No bowel obstruction. Appendix is not visualized. No evidence of   inflammation in the pericecal region. Large colonic stool burden   including mildly distended ascending and transverse colon due to fecal   burden.  PERITONEUM: Trace free pelvic fluid.  VESSELS: Atherosclerotic changes.  RETROPERITONEUM/LYMPH NODES: No lymphadenopathy.  ABDOMINAL WALL: Within normal limits.  BONES: Degenerative changes. L3 vertebral body hemangioma.    IMPRESSION:    1. Mild to moderate bilateral hydroureteronephrosis to the level of   bladder. Markedly distended bladder with mild wall thickening.   Correlation with urinalysis is recommended to evaluate for urinary tract   infection.  2. Large colonic stool burden including mild distention of the ascending   and transverse colon due to extensive fecal burden.             HPI:  Patient is a 66 year old male w/phx of HTN presents for abdominal pain over the past week. No h/o GI problems. Usually takes OTC fiber (psyllium, flax seeds) however over the past few weeks has changed diet due to dental procedure. For the past 1 week, has reported  diffuse bilateral lower abdominal pain, non radiating , associated with constipation  as well as urinary incontinence while straining.  Did not take anything for pain , no specific exacerbating factors. He reports nausea or vomiting , but does have decreased appetite and po intake. He has no fever or chills .  During this time having some urinary incontinence overnight. Also w/ small amounts of liquid stool. Last colonoscopy 7 years ago w/ inadequate prep. At baseline, he has a BM 2-3 times a week. No blood or melena.    On admission, patient HDS. Exam w/ c/f rectal mass. CT w/o rectal mass. CT showing bilateral hydro, large stool burden, and c/f cystitis.     Allergies:  No Known Allergies      Home Medications:    Hospital Medications:  acetaminophen     Tablet .. 650 milliGRAM(s) Oral every 6 hours PRN  aluminum hydroxide/magnesium hydroxide/simethicone Suspension 30 milliLiter(s) Oral every 4 hours PRN  bisacodyl Suppository 10 milliGRAM(s) Rectal daily PRN  melatonin 3 milliGRAM(s) Oral at bedtime PRN  ondansetron Injectable 4 milliGRAM(s) IV Push every 8 hours PRN  polyethylene glycol 3350 17 Gram(s) Oral daily  senna 2 Tablet(s) Oral at bedtime  tamsulosin 0.4 milliGRAM(s) Oral at bedtime      PMHX/PSHX:  Hypertension    H/O: rheumatic fever    S/P tonsillectomy        Family history:  FH: bladder cancer (Father)    FH: CAD (coronary artery disease) (Mother)        Denies family history of colon cancer/polyps, stomach cancer/polyps, pancreatic cancer/masses, liver cancer/disease, ovarian cancer and endometrial cancer.    Social History:   Tob: Denies  EtOH: Denies  Illicit Drugs: Denies    ROS:     General:  No wt loss, fevers, chills, night sweats, fatigue  Eyes:  Good vision, no reported pain  ENT:  No sore throat, pain, runny nose, dysphagia  CV:  No pain, palpitations, hypo/hypertension  Pulm:  No dyspnea, cough, tachypnea, wheezing  GI:  see HPI  :  No pain, bleeding, incontinence, nocturia  Muscle:  No pain, weakness  Neuro:  No weakness, tingling, memory problems  Psych:  No fatigue, insomnia, mood problems, depression  Endocrine:  No polyuria, polydipsia, cold/heat intolerance  Heme:  No petechiae, ecchymosis, easy bruisability  Skin:  No rash, tattoos, scars, edema    PHYSICAL EXAM:     GENERAL:  No acute distress  HEENT:  NCAT, no scleral icterus   CHEST:  no respiratory distress  HEART:  Regular rate and rhythm  ABDOMEN:  Soft, non-tender, non-distended, normoactive bowel sounds,  no masses  Rectal: large firm anterior, likely bladder/prostate, no stool  EXTREMITIES: No edema  SKIN:  No rash/erythema/ecchymoses/petechiae/wounds/abscess/warm/dry  NEURO:  Alert and oriented x 3, no asterixis    Vital Signs:  Vital Signs Last 24 Hrs  T(C): 36.5 (2022 11:42), Max: 37.1 (2022 23:30)  T(F): 97.7 (2022 11:42), Max: 98.7 (2022 23:30)  HR: 66 (2022 11:42) (61 - 80)  BP: 116/66 (2022 11:42) (116/66 - 170/90)  BP(mean): --  RR: 18 (2022 11:42) (17 - 22)  SpO2: 98% (2022 11:42) (97% - 100%)  Daily Height in cm: 170.18 (2022 17:21)    Daily     LABS:                        10.4   7.29  )-----------( 307      ( 2022 07:16 )             31.1     Mean Cell Volume: 90.4 fl (- @ 07:16)        141  |  103  |  64<H>  ----------------------------<  82  5.2   |  27  |  4.53<H>    Ca    9.4      2022 07:16  Phos  5.2       Mg     3.6         TPro  6.8  /  Alb  3.8  /  TBili  0.3  /  DBili  x   /  AST  22  /  ALT  18  /  AlkPhos  66      LIVER FUNCTIONS - ( 2022 07:16 )  Alb: 3.8 g/dL / Pro: 6.8 g/dL / ALK PHOS: 66 U/L / ALT: 18 U/L / AST: 22 U/L / GGT: x             Urinalysis Basic - ( 2022 18:51 )    Color: Light Yellow / Appearance: Clear / S.010 / pH: x  Gluc: x / Ketone: Negative  / Bili: Negative / Urobili: Negative   Blood: x / Protein: Negative / Nitrite: Negative   Leuk Esterase: Negative / RBC: 1 /hpf / WBC 4 /HPF   Sq Epi: x / Non Sq Epi: 1 /hpf / Bacteria: Negative      Amylase Serum--      Lipase [id number]       Ammonia--                          10.4   7.29  )-----------( 307      ( 2022 07:16 )             31.1                         11.2   8.66  )-----------( 313      ( 2022 18:53 )             33.8       Imaging:  CT A/P 22  FINDINGS:  Evaluation of solid organs and vascular structures is limited without   intravenous contrast.    LOWER CHEST: Trace pericardial effusion.    LIVER: Within normal limits.  BILE DUCTS: Normal caliber.  GALLBLADDER: Within normal limits.  SPLEEN: Within normal limits.  PANCREAS: Within normal limits.  ADRENALS: Within normallimits.  KIDNEYS/URETERS: Mild to moderate bilateral hydroureteronephrosis the   level of the bladder without obstructing stone identified. Right renal   cyst. Bilateral nonobstructing renal calculi measuring up to 4 mm.    BLADDER: Markedly distended. Mild wall thickening.  REPRODUCTIVE ORGANS: Enlarged prostate.    BOWEL: No bowel obstruction. Appendix is not visualized. No evidence of   inflammation in the pericecal region. Large colonic stool burden   including mildly distended ascending and transverse colon due to fecal   burden.  PERITONEUM: Trace free pelvic fluid.  VESSELS: Atherosclerotic changes.  RETROPERITONEUM/LYMPH NODES: No lymphadenopathy.  ABDOMINAL WALL: Within normal limits.  BONES: Degenerative changes. L3 vertebral body hemangioma.    IMPRESSION:    1. Mild to moderate bilateral hydroureteronephrosis to the level of   bladder. Markedly distended bladder with mild wall thickening.   Correlation with urinalysis is recommended to evaluate for urinary tract   infection.  2. Large colonic stool burden including mild distention of the ascending   and transverse colon due to extensive fecal burden.

## 2022-02-22 NOTE — CONSULT NOTE ADULT - ASSESSMENT
65 yo male with PMH of BPH who arrives with urinary retention causing bl mild to mod hydronephrosis and NICOLETTE & severe constipation    coon in place with sig quantity of UOP. Monitor for post obstructive diuresis ( UOP >200CC/HR FOR 2+ HRS) and if occurs monitor electrolytes and replete at half that which comes out.   UOP q 2 hrs   keep coon   start flomax  must resolve constipation   trend creat. and monitor electrolytes   fu ucx   current hematuria likely form rapid bladder decompression nand expect it to self resolve with hydration

## 2022-02-22 NOTE — CONSULT NOTE ADULT - ATTENDING COMMENTS
Patient seen and examined, agree with above. No rectal masses seen on CT, likely has heavy stool burden/bladder distension as cause of firmness felt on rectal exam. Would give SMOG enemas and laxatives as above. F/u urology for hydronephrosis/obstructive uropathy. Eventually he should have colonoscopy as outpatient. After he undergoes bowel cleanse with enemas/GOlytely, would place on Miralax standing BID.

## 2022-02-23 LAB
ANION GAP SERPL CALC-SCNC: 16 MMOL/L — SIGNIFICANT CHANGE UP (ref 5–17)
BUN SERPL-MCNC: 47 MG/DL — HIGH (ref 7–23)
CALCIUM SERPL-MCNC: 8.7 MG/DL — SIGNIFICANT CHANGE UP (ref 8.4–10.5)
CHLORIDE SERPL-SCNC: 104 MMOL/L — SIGNIFICANT CHANGE UP (ref 96–108)
CO2 SERPL-SCNC: 25 MMOL/L — SIGNIFICANT CHANGE UP (ref 22–31)
CREAT SERPL-MCNC: 2.25 MG/DL — HIGH (ref 0.5–1.3)
GLUCOSE SERPL-MCNC: 171 MG/DL — HIGH (ref 70–99)
HCT VFR BLD CALC: 33.3 % — LOW (ref 39–50)
HGB BLD-MCNC: 11.1 G/DL — LOW (ref 13–17)
MCHC RBC-ENTMCNC: 30.3 PG — SIGNIFICANT CHANGE UP (ref 27–34)
MCHC RBC-ENTMCNC: 33.3 GM/DL — SIGNIFICANT CHANGE UP (ref 32–36)
MCV RBC AUTO: 91 FL — SIGNIFICANT CHANGE UP (ref 80–100)
NRBC # BLD: 0 /100 WBCS — SIGNIFICANT CHANGE UP (ref 0–0)
PLATELET # BLD AUTO: 314 K/UL — SIGNIFICANT CHANGE UP (ref 150–400)
POTASSIUM SERPL-MCNC: 3.7 MMOL/L — SIGNIFICANT CHANGE UP (ref 3.5–5.3)
POTASSIUM SERPL-SCNC: 3.7 MMOL/L — SIGNIFICANT CHANGE UP (ref 3.5–5.3)
RBC # BLD: 3.66 M/UL — LOW (ref 4.2–5.8)
RBC # FLD: 13.2 % — SIGNIFICANT CHANGE UP (ref 10.3–14.5)
SODIUM SERPL-SCNC: 145 MMOL/L — SIGNIFICANT CHANGE UP (ref 135–145)
WBC # BLD: 6.51 K/UL — SIGNIFICANT CHANGE UP (ref 3.8–10.5)
WBC # FLD AUTO: 6.51 K/UL — SIGNIFICANT CHANGE UP (ref 3.8–10.5)

## 2022-02-23 PROCEDURE — 99233 SBSQ HOSP IP/OBS HIGH 50: CPT

## 2022-02-23 PROCEDURE — 99232 SBSQ HOSP IP/OBS MODERATE 35: CPT | Mod: GC

## 2022-02-23 RX ADMIN — POLYETHYLENE GLYCOL 3350 17 GRAM(S): 17 POWDER, FOR SOLUTION ORAL at 11:06

## 2022-02-23 RX ADMIN — SENNA PLUS 2 TABLET(S): 8.6 TABLET ORAL at 22:08

## 2022-02-23 RX ADMIN — TAMSULOSIN HYDROCHLORIDE 0.4 MILLIGRAM(S): 0.4 CAPSULE ORAL at 22:08

## 2022-02-23 NOTE — PATIENT PROFILE ADULT - FUNCTIONAL ASSESSMENT - DAILY ACTIVITY 2.
4 = No assist / stand by assistance
Pt brought in from [facility] for aggression towards staff. Pt has hx of aggression towards staff. Pt will not keep mask on

## 2022-02-23 NOTE — PROGRESS NOTE ADULT - ATTENDING COMMENTS
Agree with above. Would have patient continue to drink PEG to clear bowel. F/u urology regarding the obstructive uropathy.

## 2022-02-23 NOTE — PATIENT PROFILE ADULT - FALL HARM RISK - HARM RISK INTERVENTIONS

## 2022-02-24 LAB
ANION GAP SERPL CALC-SCNC: 13 MMOL/L — SIGNIFICANT CHANGE UP (ref 5–17)
BUN SERPL-MCNC: 38 MG/DL — HIGH (ref 7–23)
CALCIUM SERPL-MCNC: 8.4 MG/DL — SIGNIFICANT CHANGE UP (ref 8.4–10.5)
CHLORIDE SERPL-SCNC: 103 MMOL/L — SIGNIFICANT CHANGE UP (ref 96–108)
CO2 SERPL-SCNC: 28 MMOL/L — SIGNIFICANT CHANGE UP (ref 22–31)
CREAT SERPL-MCNC: 1.8 MG/DL — HIGH (ref 0.5–1.3)
GLUCOSE SERPL-MCNC: 90 MG/DL — SIGNIFICANT CHANGE UP (ref 70–99)
HCT VFR BLD CALC: 29.8 % — LOW (ref 39–50)
HGB BLD-MCNC: 9.9 G/DL — LOW (ref 13–17)
MCHC RBC-ENTMCNC: 30.3 PG — SIGNIFICANT CHANGE UP (ref 27–34)
MCHC RBC-ENTMCNC: 33.2 GM/DL — SIGNIFICANT CHANGE UP (ref 32–36)
MCV RBC AUTO: 91.1 FL — SIGNIFICANT CHANGE UP (ref 80–100)
NRBC # BLD: 0 /100 WBCS — SIGNIFICANT CHANGE UP (ref 0–0)
PLATELET # BLD AUTO: 306 K/UL — SIGNIFICANT CHANGE UP (ref 150–400)
POTASSIUM SERPL-MCNC: 3.5 MMOL/L — SIGNIFICANT CHANGE UP (ref 3.5–5.3)
POTASSIUM SERPL-SCNC: 3.5 MMOL/L — SIGNIFICANT CHANGE UP (ref 3.5–5.3)
RBC # BLD: 3.27 M/UL — LOW (ref 4.2–5.8)
RBC # FLD: 13.1 % — SIGNIFICANT CHANGE UP (ref 10.3–14.5)
SODIUM SERPL-SCNC: 144 MMOL/L — SIGNIFICANT CHANGE UP (ref 135–145)
WBC # BLD: 5.64 K/UL — SIGNIFICANT CHANGE UP (ref 3.8–10.5)
WBC # FLD AUTO: 5.64 K/UL — SIGNIFICANT CHANGE UP (ref 3.8–10.5)

## 2022-02-24 PROCEDURE — 99232 SBSQ HOSP IP/OBS MODERATE 35: CPT | Mod: GC

## 2022-02-24 PROCEDURE — 99233 SBSQ HOSP IP/OBS HIGH 50: CPT

## 2022-02-24 RX ORDER — POLYETHYLENE GLYCOL 3350 17 G/17G
17 POWDER, FOR SOLUTION ORAL
Refills: 0 | Status: DISCONTINUED | OUTPATIENT
Start: 2022-02-24 | End: 2022-02-26

## 2022-02-24 RX ADMIN — TAMSULOSIN HYDROCHLORIDE 0.4 MILLIGRAM(S): 0.4 CAPSULE ORAL at 22:00

## 2022-02-24 RX ADMIN — POLYETHYLENE GLYCOL 3350 17 GRAM(S): 17 POWDER, FOR SOLUTION ORAL at 11:23

## 2022-02-24 RX ADMIN — SENNA PLUS 2 TABLET(S): 8.6 TABLET ORAL at 22:00

## 2022-02-24 RX ADMIN — Medication 10 MILLIGRAM(S): at 17:30

## 2022-02-24 NOTE — PHYSICAL THERAPY INITIAL EVALUATION ADULT - ADDITIONAL COMMENTS
Pt lives alone in an apartment, denies use of stairs to enter or within (elevator access), however states that he much negotiate stairs at work and spend a lot of his time standing and walking longer distances working as a . Pt reports being independent PTA and denies use of AD.

## 2022-02-24 NOTE — DIETITIAN INITIAL EVALUATION ADULT. - PHYSCIAL ASSESSMENT
Skin: no noted pressure injuries as per documentation.  Performed nutrition focused physical exam with pt's consent and noted: underweight

## 2022-02-24 NOTE — DIETITIAN INITIAL EVALUATION ADULT. - PROBLEM SELECTOR PLAN 1
Pt arrived EMS for rapid heart rate. EMS reports SVT en route to [facility] ED. Pt arrived Sinus Tach.   Hx of diabetes suspect obstructive , b/l hydronephrosis on CT  also noted enlarged prostate ,s/p coon , UA neg   - maintain coon   -  consult - to be arranged by day team   - s/p hyperkalemia treatment , monitor K   - urine Na and creatinine for FeNa  - strict ins and outs   - avoid nephrotoxins   - monitor creatinine

## 2022-02-24 NOTE — DIETITIAN INITIAL EVALUATION ADULT. - REASON FOR ADMISSION
Pt 65 y/o M with PMH as per chart: HTN, admitted with abdominal pain x 1 week, found with urinary retention causing bilateral mild to moderate  hydronephrosis, NICOLETTE, constipation with large stool burden S/P SMOG x 2 and Golytely 4 liter in progress.

## 2022-02-24 NOTE — PROGRESS NOTE ADULT - ATTENDING COMMENTS
Agree with above. Patient has had multiple large BMs since admission - would continue Miralax BID standing. Can follow-up as outpatient for eventual screening colonoscopy.

## 2022-02-24 NOTE — PHYSICAL THERAPY INITIAL EVALUATION ADULT - PLANNED THERAPY INTERVENTIONS, PT EVAL
Stair Negotiation: GOAL: Pt will negotiate a full flight of stairs independently within 4 weeks./balance training/gait training/strengthening

## 2022-02-24 NOTE — DIETITIAN INITIAL EVALUATION ADULT. - DIET TYPE
Recommend low salt diet. Defer diet/fluid consistencies to medical team/pt's preference. Will continue to monitor as able and adjust as needed (monitor electrolytes).

## 2022-02-24 NOTE — DIETITIAN INITIAL EVALUATION ADULT. - ADD RECOMMEND
1. Will continue to monitor PO intake, weight, labs, skin, GI status, diet. 2. Encourage PO intake and honor food preferences. 3. Consider Nephro-Janet if medically feasible to optimize nutrient intake. 4. Provided recommendations to optimize PO and protein intake and help with constipation. 5. Obtained some food preferences at this time and reviewed menu order procedures. 6. Malnutrition/BMI <19 notification placed in chart.

## 2022-02-24 NOTE — PHYSICAL THERAPY INITIAL EVALUATION ADULT - PRECAUTIONS/LIMITATIONS, REHAB EVAL
has no fever or chills. CT ABD: 1. Mild to moderate bilateral hydroureteronephrosis to the level of bladder. Markedly distended bladder with mild wall thickening. Correlation with urinalysis is recommended to evaluate for urinary tract infection. 2. Large colonic stool burden including mild distention of the ascending and transverse colon due to extensive fecal burden.

## 2022-02-24 NOTE — CHART NOTE - NSCHARTNOTEFT_GEN_A_CORE
Pt with improved SCr.  Coon should remain in place until adequate bowel regimen is started and stool burden resolved.   Please continue coon for minimum 5 days then TOV per primary team once patient is optimized. Pt can follow outpt after discharge for TOV if still with coon at this time.     Voiding trial: Optimize with all of the following:  - Encourage Ambulation / Out of Bed  - Initiate / Continue Bowel Regimen*****  - Minimize Narcotics / Benzodiazepines  - Minimize anticholinergics / antihistamines  - tamsulosin 0.4mg QHS      Please call with any further questions  Hu Stern MD    Urology     [facility] Pager #[phone]  [facility] Pager #[phone]  Reachable on Teams M-F 6am-6pm Pt with improved SCr.  Coon should remain in place until adequate bowel regimen is started and stool burden resolved.      Please continue coon through discharge.  Pt should follow outpt with Dr. Mccormack for TOV.       Please call with any further questions  Hu Stern MD    Urology     [facility] Pager #[phone]  [facility] Pager #[phone]  Reachable on Teams M-F 6am-6pm

## 2022-02-24 NOTE — DIETITIAN INITIAL EVALUATION ADULT. - PERSON TAUGHT/METHOD
Optimize PO/protein intake, help with constipation, and menu order procedures/verbal instruction/patient instructed

## 2022-02-24 NOTE — DIETITIAN INITIAL EVALUATION ADULT. - OTHER INFO
Pt reports improving appetite and PO intake in house. Noted 51-75% PO intake x 1 on (02/23) as per flow sheets. Refused nutritional supplements. Denies difficulty chewing/swallowing at this time, refused changes in diet texture. Reports nausea without vomiting and confirms constipation, last BM yesterday (02/23). Pt on bowel regimen as per chart, agreed to prune juice. Pt requests low salt diet.     Pt reports 20 pounds weight loss x ~3 weeks PTA due to decreased PO intake, from 135 to 115 pounds. Weight as per flow sheets (02/22) 115 pounds.     Provided recommendations to optimize PO and protein intake and help with constipation, recommended small frequent meals by ordering nutrient-dense snacks and leaving non-perishable food away from tray for later consumption during the day or between meals and to start with protein, reviewed foods with protein available within preferences; recommended fruits, vegetables, whole grains, and water intake. Obtained some food preferences at this time and reviewed menu order procedures. Pt made aware RD remains available.

## 2022-02-24 NOTE — PHYSICAL THERAPY INITIAL EVALUATION ADULT - PERTINENT HX OF CURRENT PROBLEM, REHAB EVAL
66yoM w/phx of HTN presents for abdominal pain over the past week. Pt reports diffuse bilateral lower abdominal pain, non radiating, associated with constipation as well as urinary incontinence while straining.  Did not take anything for pain, no specific exacerbating factors. He reports nausea or vomiting, but does have decreased appetite and po intake. He

## 2022-02-25 LAB
ANION GAP SERPL CALC-SCNC: 13 MMOL/L — SIGNIFICANT CHANGE UP (ref 5–17)
BUN SERPL-MCNC: 24 MG/DL — HIGH (ref 7–23)
CALCIUM SERPL-MCNC: 8.2 MG/DL — LOW (ref 8.4–10.5)
CHLORIDE SERPL-SCNC: 102 MMOL/L — SIGNIFICANT CHANGE UP (ref 96–108)
CO2 SERPL-SCNC: 28 MMOL/L — SIGNIFICANT CHANGE UP (ref 22–31)
CREAT SERPL-MCNC: 1.21 MG/DL — SIGNIFICANT CHANGE UP (ref 0.5–1.3)
GLUCOSE SERPL-MCNC: 77 MG/DL — SIGNIFICANT CHANGE UP (ref 70–99)
HCT VFR BLD CALC: 28.7 % — LOW (ref 39–50)
HGB BLD-MCNC: 9.5 G/DL — LOW (ref 13–17)
MCHC RBC-ENTMCNC: 29.9 PG — SIGNIFICANT CHANGE UP (ref 27–34)
MCHC RBC-ENTMCNC: 33.1 GM/DL — SIGNIFICANT CHANGE UP (ref 32–36)
MCV RBC AUTO: 90.3 FL — SIGNIFICANT CHANGE UP (ref 80–100)
NRBC # BLD: 0 /100 WBCS — SIGNIFICANT CHANGE UP (ref 0–0)
PLATELET # BLD AUTO: 313 K/UL — SIGNIFICANT CHANGE UP (ref 150–400)
POTASSIUM SERPL-MCNC: 3 MMOL/L — LOW (ref 3.5–5.3)
POTASSIUM SERPL-SCNC: 3 MMOL/L — LOW (ref 3.5–5.3)
RBC # BLD: 3.18 M/UL — LOW (ref 4.2–5.8)
RBC # FLD: 12.8 % — SIGNIFICANT CHANGE UP (ref 10.3–14.5)
SODIUM SERPL-SCNC: 143 MMOL/L — SIGNIFICANT CHANGE UP (ref 135–145)
WBC # BLD: 4.66 K/UL — SIGNIFICANT CHANGE UP (ref 3.8–10.5)
WBC # FLD AUTO: 4.66 K/UL — SIGNIFICANT CHANGE UP (ref 3.8–10.5)

## 2022-02-25 PROCEDURE — 99231 SBSQ HOSP IP/OBS SF/LOW 25: CPT

## 2022-02-25 PROCEDURE — 99233 SBSQ HOSP IP/OBS HIGH 50: CPT

## 2022-02-25 RX ORDER — POTASSIUM CHLORIDE 20 MEQ
40 PACKET (EA) ORAL EVERY 4 HOURS
Refills: 0 | Status: COMPLETED | OUTPATIENT
Start: 2022-02-25 | End: 2022-02-25

## 2022-02-25 RX ADMIN — Medication 40 MILLIEQUIVALENT(S): at 13:44

## 2022-02-25 RX ADMIN — TAMSULOSIN HYDROCHLORIDE 0.4 MILLIGRAM(S): 0.4 CAPSULE ORAL at 21:48

## 2022-02-25 RX ADMIN — Medication 10 MILLIGRAM(S): at 16:52

## 2022-02-25 RX ADMIN — POLYETHYLENE GLYCOL 3350 17 GRAM(S): 17 POWDER, FOR SOLUTION ORAL at 17:35

## 2022-02-25 RX ADMIN — POLYETHYLENE GLYCOL 3350 17 GRAM(S): 17 POWDER, FOR SOLUTION ORAL at 05:21

## 2022-02-25 RX ADMIN — Medication 40 MILLIEQUIVALENT(S): at 08:30

## 2022-02-26 ENCOUNTER — TRANSCRIPTION ENCOUNTER (OUTPATIENT)
Age: 67
End: 2022-02-26

## 2022-02-26 VITALS
SYSTOLIC BLOOD PRESSURE: 122 MMHG | HEART RATE: 78 BPM | RESPIRATION RATE: 18 BRPM | DIASTOLIC BLOOD PRESSURE: 71 MMHG | TEMPERATURE: 99 F | OXYGEN SATURATION: 99 %

## 2022-02-26 PROCEDURE — 85014 HEMATOCRIT: CPT

## 2022-02-26 PROCEDURE — 83605 ASSAY OF LACTIC ACID: CPT

## 2022-02-26 PROCEDURE — 86803 HEPATITIS C AB TEST: CPT

## 2022-02-26 PROCEDURE — 84100 ASSAY OF PHOSPHORUS: CPT

## 2022-02-26 PROCEDURE — 82435 ASSAY OF BLOOD CHLORIDE: CPT

## 2022-02-26 PROCEDURE — 82803 BLOOD GASES ANY COMBINATION: CPT

## 2022-02-26 PROCEDURE — 99239 HOSP IP/OBS DSCHRG MGMT >30: CPT

## 2022-02-26 PROCEDURE — 80053 COMPREHEN METABOLIC PANEL: CPT

## 2022-02-26 PROCEDURE — 85027 COMPLETE CBC AUTOMATED: CPT

## 2022-02-26 PROCEDURE — 36415 COLL VENOUS BLD VENIPUNCTURE: CPT

## 2022-02-26 PROCEDURE — 83735 ASSAY OF MAGNESIUM: CPT

## 2022-02-26 PROCEDURE — 85018 HEMOGLOBIN: CPT

## 2022-02-26 PROCEDURE — 80048 BASIC METABOLIC PNL TOTAL CA: CPT

## 2022-02-26 PROCEDURE — 96375 TX/PRO/DX INJ NEW DRUG ADDON: CPT

## 2022-02-26 PROCEDURE — 82962 GLUCOSE BLOOD TEST: CPT

## 2022-02-26 PROCEDURE — 96374 THER/PROPH/DIAG INJ IV PUSH: CPT

## 2022-02-26 PROCEDURE — 82330 ASSAY OF CALCIUM: CPT

## 2022-02-26 PROCEDURE — 97161 PT EVAL LOW COMPLEX 20 MIN: CPT

## 2022-02-26 PROCEDURE — 85025 COMPLETE CBC W/AUTO DIFF WBC: CPT

## 2022-02-26 PROCEDURE — 87086 URINE CULTURE/COLONY COUNT: CPT

## 2022-02-26 PROCEDURE — 81001 URINALYSIS AUTO W/SCOPE: CPT

## 2022-02-26 PROCEDURE — 74176 CT ABD & PELVIS W/O CONTRAST: CPT | Mod: MA

## 2022-02-26 PROCEDURE — 84295 ASSAY OF SERUM SODIUM: CPT

## 2022-02-26 PROCEDURE — 82947 ASSAY GLUCOSE BLOOD QUANT: CPT

## 2022-02-26 PROCEDURE — 87637 SARSCOV2&INF A&B&RSV AMP PRB: CPT

## 2022-02-26 PROCEDURE — 99285 EMERGENCY DEPT VISIT HI MDM: CPT | Mod: 25

## 2022-02-26 PROCEDURE — 83690 ASSAY OF LIPASE: CPT

## 2022-02-26 PROCEDURE — 84132 ASSAY OF SERUM POTASSIUM: CPT

## 2022-02-26 RX ORDER — LANOLIN ALCOHOL/MO/W.PET/CERES
1 CREAM (GRAM) TOPICAL
Qty: 0 | Refills: 0 | DISCHARGE
Start: 2022-02-26

## 2022-02-26 RX ORDER — TAMSULOSIN HYDROCHLORIDE 0.4 MG/1
1 CAPSULE ORAL
Qty: 30 | Refills: 0
Start: 2022-02-26 | End: 2022-03-27

## 2022-02-26 RX ORDER — SENNA PLUS 8.6 MG/1
2 TABLET ORAL
Qty: 0 | Refills: 0 | DISCHARGE
Start: 2022-02-26

## 2022-02-26 RX ORDER — POLYETHYLENE GLYCOL 3350 17 G/17G
17 POWDER, FOR SOLUTION ORAL
Qty: 0 | Refills: 0 | DISCHARGE
Start: 2022-02-26

## 2022-02-26 RX ADMIN — POLYETHYLENE GLYCOL 3350 17 GRAM(S): 17 POWDER, FOR SOLUTION ORAL at 17:54

## 2022-02-26 NOTE — PROGRESS NOTE ADULT - NUTRITIONAL ASSESSMENT
This patient has been assessed with a concern for Malnutrition and has been determined to have a diagnosis/diagnoses of Severe protein-calorie malnutrition and Underweight (BMI < 19).    This patient is being managed with:   Diet Regular-  Entered: Feb 21 2022 11:45PM    
This patient has been assessed with a concern for Malnutrition and has been determined to have a diagnosis/diagnoses of Severe protein-calorie malnutrition and Underweight (BMI < 19).    This patient is being managed with:   Diet Regular-  Low Sodium  Entered: Feb 24 2022  2:39PM    
This patient has been assessed with a concern for Malnutrition and has been determined to have a diagnosis/diagnoses of Severe protein-calorie malnutrition and Underweight (BMI < 19).    This patient is being managed with:   Diet Regular-  Low Sodium  Entered: Feb 24 2022  2:39PM

## 2022-02-26 NOTE — DISCHARGE NOTE NURSING/CASE MANAGEMENT/SOCIAL WORK - NSDCFUADDAPPT_GEN_ALL_CORE_FT
[facility]  [address] [place] [id number]  [phone]     APPTS ARE READY TO BE MADE: [ ] YES    Best Family or Patient Contact (if needed):    Additional Information about above appointments (if needed):    1: urology   2:   3:     Other comments or requests:

## 2022-02-26 NOTE — PROGRESS NOTE ADULT - PROBLEM SELECTOR PLAN 2
large stool burden on imaging w/o obstruction , possible rectal mass on  ED exam , c/w presentation , can be contributing to NICOLETTE   - Miralax/ senna  - dulcolax suppository   - GI eval appreciated: will give SMOG x 2 and Golytely 4 liter   - Tylenol PRN for pain, If not improving , can give tramadol
large stool burden on imaging w/o obstruction , possible rectal mass on  ED exam , c/w presentation , can be contributing to NICOLETTE   - Miralax BID/ senna  - dulcolax suppository   - GI eval appreciated: s/p SMOG x 2 and Golytely 4 liter in progress  - Tylenol PRN for pain, If not improving , can give tramadol
large stool burden on imaging w/o obstruction , possible rectal mass on  ED exam , c/w presentation , can be contributing to NICOLETTE   - Miralax/ senna  - dulcolax suppository   - GI eval appreciated: s/p SMOG x 2 and Golytely 4 liter in progress  - Tylenol PRN for pain, If not improving , can give tramadol
large stool burden on imaging w/o obstruction, s/p enema, go lytely, suppository, now resolved  - Cont aggressive bowel regimen
large stool burden on imaging w/o obstruction, s/p enema, go lytely, suppository, now resolved  - Cont aggressive bowel regimen

## 2022-02-26 NOTE — PROGRESS NOTE ADULT - PROVIDER SPECIALTY LIST ADULT
Gastroenterology
Urology
Gastroenterology
Internal Medicine

## 2022-02-26 NOTE — DISCHARGE NOTE PROVIDER - NSDCMRMEDTOKEN_GEN_ALL_CORE_FT
bisacodyl 10 mg rectal suppository: 1 suppository(ies) rectal once a day, As needed, Constipation  melatonin 3 mg oral tablet: 1 tab(s) orally once a day (at bedtime), As needed, Insomnia  polyethylene glycol 3350 oral powder for reconstitution: 17 gram(s) orally 2 times a day  senna oral tablet: 2 tab(s) orally once a day (at bedtime)   bisacodyl 10 mg rectal suppository: 1 suppository(ies) rectal once a day, As needed, Constipation  melatonin 3 mg oral tablet: 1 tab(s) orally once a day (at bedtime), As needed, Insomnia  polyethylene glycol 3350 oral powder for reconstitution: 17 gram(s) orally 2 times a day  senna oral tablet: 2 tab(s) orally once a day (at bedtime)  tamsulosin 0.4 mg oral capsule: 1 cap(s) orally once a day (at bedtime)

## 2022-02-26 NOTE — DISCHARGE NOTE PROVIDER - HOSPITAL COURSE
67 y/o male with PMHx of BPH admitted with severe constipation, urinary retention causing b/l mild to mod hydronephrosis and NICOLETTE     Problem/Plan - 1:  ·  Problem: NICOLETTE (acute kidney injury).   ·  Plan: 2/2 obstructive uropathy, b/l hydronephrosis on CT  also noted enlarged prostate ,s/p coon , UA neg   - Urology consult appreciated - flomax started, discharge with coon and outpatient followup   - Now resolved.     Problem/Plan - 2:  ·  Problem: Abdominal pain.   ·  Plan: large stool burden on imaging w/o obstruction, s/p enema, go lytely, suppository, now resolved  - Cont aggressive bowel regimen.     Problem/Plan - 3:  ·  Problem: HTN (hypertension).   ·  Plan: diet controlled.       Additional Information:  Additional Information: Stable for discharge to home today with outpatient urology followup  Discharge time: 45 minutes    d/w UNRULY Long

## 2022-02-26 NOTE — PROGRESS NOTE ADULT - REASON FOR ADMISSION
abdominal pain x 1 week

## 2022-02-26 NOTE — PROGRESS NOTE ADULT - ASSESSMENT
Impression:  # abd pain - likely 2/2 hydronephrotosis +/- constipation, being seen by urology, s/p coon  # constipation - likely slow transit constipation, no h/o pelvic floor dysfunction. Will increase laxatives, and recommend outpt f/u    Recommendations:  - f/u urology recs  - miralax BID standing  - outpatient GI follow up for eventual colonoscopy    GI will sign off. Please reconsult as necessary    All recommendations are tentative until note is attested by attending.     Trino Rabago, PGY5  Gastroenterology/Hepatology Fellow  Available on [employer] Teams  [phone] ("[employer] Short Range Pager)  [phone] (Long Range Pager)    After 5pm, please contact the on-call GI fellow. [phone]    
Impression:  # abd pain - likely 2/2 hydronephrotosis +/- constipation, being seen by urology, s/p coon  # constipation - likely slow transit constipation, no h/o pelvic floor dysfunction. Will increase laxatives, and recommend outpt f/u    Recommendations:  - f/u urology recs  - please give additional 1 SMOG enema today  - c/w  golytely 4L to patients bedside, should drink throughout the day until patient having multiple bowel movements  - once patient has bowel cleanse, will begin miralax BID standing  - outpatient GI follow up for eventual colonoscopy    GI will continue to follow.     All recommendations are tentative until note is attested by attending.     Trino Rabago, PGY5  Gastroenterology/Hepatology Fellow  Available on [employer] Teams  [phone] ([employer] Short Range Pager)  [phone] (Long Range Pager)    After 5pm, please contact the on-call GI fellow. [phone]  
 66 year old male w/phx of HTN presents for abdominal pain over the past week. 
67 y/o male with PMHx of BPH admitted with severe constipation, urinary retention causing b/l mild to mod hydronephrosis and NICOLETTE     -Recommend going home with coon, should remain in place for one week before attempted TOV  -Follow up with Dr. Mccormack or first available urologist at [facility]   -Continue flomax 0.4mg   -Continue aggressive bowel regimen, should start having normal BMs prior to void trial   -All questions and concerns were answered, pt is agreeable to going home with coon     [facility]  [address] [place] [id number] [phone]   
 66 year old male w/phx of HTN presents for abdominal pain over the past week. 

## 2022-02-26 NOTE — PROGRESS NOTE ADULT - SUBJECTIVE AND OBJECTIVE BOX
[facility] Division of Hospital Medicine  Chelly Melara MD  Pager (M-F, 8A-5P): [phone]  Other Times:  [phone]    Patient is a 66y old  Male who presents with a chief complaint of abdominal pain x 1 week (24 Feb 2022 12:45)      SUBJECTIVE / OVERNIGHT EVENTS:  afebrile. last BM yest 7 am none since. denies any abd pain.     ADDITIONAL REVIEW OF SYSTEMS: otherwise neg    MEDICATIONS  (STANDING):  polyethylene glycol 3350 17 Gram(s) Oral daily  senna 2 Tablet(s) Oral at bedtime  tamsulosin 0.4 milliGRAM(s) Oral at bedtime    MEDICATIONS  (PRN):  acetaminophen     Tablet .. 650 milliGRAM(s) Oral every 6 hours PRN Temp greater or equal to 38C (100.4F), Mild Pain (1 - 3)  aluminum hydroxide/magnesium hydroxide/simethicone Suspension 30 milliLiter(s) Oral every 4 hours PRN Dyspepsia  bisacodyl Suppository 10 milliGRAM(s) Rectal daily PRN Constipation  melatonin 3 milliGRAM(s) Oral at bedtime PRN Insomnia  ondansetron Injectable 4 milliGRAM(s) IV Push every 8 hours PRN Nausea and/or Vomiting      CAPILLARY BLOOD GLUCOSE        I&O's Summary    23 Feb 2022 07:01  -  24 Feb 2022 07:00  --------------------------------------------------------  IN: 360 mL / OUT: 3100 mL / NET: -2740 mL        PHYSICAL EXAM:  Vital Signs Last 24 Hrs  T(C): 36.9 (24 Feb 2022 13:54), Max: 37.1 (24 Feb 2022 04:41)  T(F): 98.4 (24 Feb 2022 13:54), Max: 98.8 (24 Feb 2022 04:41)  HR: 75 (24 Feb 2022 13:54) (69 - 75)  BP: 117/76 (24 Feb 2022 13:54) (105/65 - 117/76)  BP(mean): --  RR: 18 (24 Feb 2022 13:54) (18 - 18)  SpO2: 99% (24 Feb 2022 13:54) (96% - 99%)    CONSTITUTIONAL: NAD,   EYES:  conjunctiva and sclera clear  ENMT: Moist oral mucosa  NECK: Supple, no palpable masses; no JVD  RESPIRATORY: Normal respiratory effort; lungs are clear to auscultation bilaterally  CARDIOVASCULAR: Regular rate and rhythm, normal S1 and S2, no murmur/rub/gallop; No lower extremity edema  ABDOMEN: soft nontender. hypoactive BS. less distention   MUSCULOSKELETAL:  no clubbing or cyanosis of digits; no joint swelling or tenderness to palpation  PSYCH: A+O to person, place, and time; affect appropriate  SKIN: bilateral LE redness/ excortiation.     LABS:                        9.9    5.64  )-----------( 306      ( 24 Feb 2022 07:05 )             29.8     02-24    144  |  103  |  38<H>  ----------------------------<  90  3.5   |  28  |  1.80<H>    Ca    8.4      24 Feb 2022 06:58                Culture - Urine (collected 21 Feb 2022 21:38)  Source: Clean Catch Clean Catch (Midstream)  Final Report (22 Feb 2022 23:04):    <10,000 CFU/mL Normal Urogenital Stephanie        RADIOLOGY & ADDITIONAL TESTS:  Results Reviewed:   Imaging Personally Reviewed:  Electrocardiogram Personally Reviewed:    COORDINATION OF CARE:  Care Discussed with Consultants/Other Providers [Y/N]:  Prior or Outpatient Records Reviewed [Y/N]:  
[facility] Division of Hospital Medicine  Chelly Melara MD  Pager (M-F, 2Z-5P): [phone]  Other Times:  [phone]    Patient is a 66y old  Male who presents with a chief complaint of abdominal pain x 1 week (2022 13:10)      SUBJECTIVE / OVERNIGHT EVENTS:  feeling hungry. denies any BMs yet after meds. urinary incontinence now with coon     ADDITIONAL REVIEW OF SYSTEMS: otherwise neg    MEDICATIONS  (STANDING):  polyethylene glycol 3350 17 Gram(s) Oral daily  polyethylene glycol/electrolyte Solution. 4000 milliLiter(s) Oral once  senna 2 Tablet(s) Oral at bedtime  sorbitol 70%/mineral oil/magnesium hydroxide/glycerin Enema 120 milliLiter(s) Rectal every 6 hours  tamsulosin 0.4 milliGRAM(s) Oral at bedtime    MEDICATIONS  (PRN):  acetaminophen     Tablet .. 650 milliGRAM(s) Oral every 6 hours PRN Temp greater or equal to 38C (100.4F), Mild Pain (1 - 3)  aluminum hydroxide/magnesium hydroxide/simethicone Suspension 30 milliLiter(s) Oral every 4 hours PRN Dyspepsia  bisacodyl Suppository 10 milliGRAM(s) Rectal daily PRN Constipation  melatonin 3 milliGRAM(s) Oral at bedtime PRN Insomnia  ondansetron Injectable 4 milliGRAM(s) IV Push every 8 hours PRN Nausea and/or Vomiting      CAPILLARY BLOOD GLUCOSE      POCT Blood Glucose.: 104 mg/dL (2022 21:11)    I&O's Summary    2022 07:01  -  2022 07:00  --------------------------------------------------------  IN: 0 mL / OUT: 1400 mL / NET: -1400 mL        PHYSICAL EXAM:  Vital Signs Last 24 Hrs  T(C): 36.5 (2022 11:42), Max: 37.1 (2022 23:30)  T(F): 97.7 (2022 11:42), Max: 98.7 (2022 23:30)  HR: 66 (2022 11:42) (61 - 80)  BP: 116/66 (2022 11:42) (116/66 - 170/90)  BP(mean): --  RR: 18 (2022 11:42) (17 - 22)  SpO2: 98% (2022 11:42) (97% - 100%)    CONSTITUTIONAL: NAD, elderly disheveled.   EYES:  conjunctiva and sclera clear  ENMT: Moist oral mucosa  NECK: Supple, no palpable masses; no JVD  RESPIRATORY: Normal respiratory effort; lungs are clear to auscultation bilaterally  CARDIOVASCULAR: Regular rate and rhythm, normal S1 and S2, no murmur/rub/gallop; No lower extremity edema  ABDOMEN: soft nontender. hypoactive BS. mild distention   MUSCULOSKELETAL:  no clubbing or cyanosis of digits; no joint swelling or tenderness to palpation  PSYCH: A+O to person, place, and time; affect appropriate  SKIN: bilateral LE redness/ excortiation.     LABS:                        10.4   7.29  )-----------( 307      ( 2022 07:16 )             31.1     02-    141  |  103  |  64<H>  ----------------------------<  82  5.2   |  27  |  4.53<H>    Ca    9.4      2022 07:16  Phos  5.2     02-  Mg     3.6     02-    TPro  6.8  /  Alb  3.8  /  TBili  0.3  /  DBili  x   /  AST  22  /  ALT  18  /  AlkPhos  66  02-22          Urinalysis Basic - ( 2022 18:51 )    Color: Light Yellow / Appearance: Clear / S.010 / pH: x  Gluc: x / Ketone: Negative  / Bili: Negative / Urobili: Negative   Blood: x / Protein: Negative / Nitrite: Negative   Leuk Esterase: Negative / RBC: 1 /hpf / WBC 4 /HPF   Sq Epi: x / Non Sq Epi: 1 /hpf / Bacteria: Negative          RADIOLOGY & ADDITIONAL TESTS:  Results Reviewed:   Imaging Personally Reviewed:  Electrocardiogram Personally Reviewed:    COORDINATION OF CARE:  Care Discussed with Consultants/Other Providers [Y/N]:  Prior or Outpatient Records Reviewed [Y/N]:  
Gastroenterology/Hepatology Progress Note      Interval Events:   - coon catheter placed w/ >1.2L UOP  - 2 enemas, second one with large bowel movements  - slight improvement in nausea and appetite    Allergies:  No Known Allergies      Hospital Medications:  acetaminophen     Tablet .. 650 milliGRAM(s) Oral every 6 hours PRN  aluminum hydroxide/magnesium hydroxide/simethicone Suspension 30 milliLiter(s) Oral every 4 hours PRN  bisacodyl Suppository 10 milliGRAM(s) Rectal daily PRN  melatonin 3 milliGRAM(s) Oral at bedtime PRN  ondansetron Injectable 4 milliGRAM(s) IV Push every 8 hours PRN  polyethylene glycol 3350 17 Gram(s) Oral daily  senna 2 Tablet(s) Oral at bedtime  tamsulosin 0.4 milliGRAM(s) Oral at bedtime      ROS: 14 point ROS negative unless otherwise state in subjective    PHYSICAL EXAM:   Vital Signs:  Vital Signs Last 24 Hrs  T(C): 36.9 (2022 05:00), Max: 36.9 (2022 21:50)  T(F): 98.5 (2022 05:00), Max: 98.5 (2022 21:50)  HR: 66 (2022 05:00) (66 - 75)  BP: 123/71 (2022 05:00) (116/66 - 134/74)  BP(mean): --  RR: 17 (2022 05:00) (17 - 18)  SpO2: 98% (2022 05:00) (96% - 98%)  Daily Height in cm: 170.18 (2022 21:50)    Daily     GENERAL:  No acute distress  HEENT:  NCAT, no scleral icterus  CHEST: no resp distress  HEART:  RRR  ABDOMEN:  Soft, non-tender, non-distended, normoactive bowel sounds, no masses  EXTREMITIES:  No cyanosis, clubbing, or edema  SKIN:  No rash/erythema/ecchymoses/petechiae/wounds/abscess/warm/dry  NEURO:  Alert and oriented x 3, no asterixis, no tremor    LABS:                        10.4   7.29  )-----------( 307      ( 2022 07:16 )             31.1       02-22    141  |  103  |  64<H>  ----------------------------<  82  5.2   |  27  |  4.53<H>    Ca    9.4      2022 07:16  Phos  5.2       Mg     3.6         TPro  6.8  /  Alb  3.8  /  TBili  0.3  /  DBili  x   /  AST  22  /  ALT  18  /  AlkPhos  66      LIVER FUNCTIONS - ( 2022 07:16 )  Alb: 3.8 g/dL / Pro: 6.8 g/dL / ALK PHOS: 66 U/L / ALT: 18 U/L / AST: 22 U/L / GGT: x             Urinalysis Basic - ( 2022 18:51 )    Color: Light Yellow / Appearance: Clear / S.010 / pH: x  Gluc: x / Ketone: Negative  / Bili: Negative / Urobili: Negative   Blood: x / Protein: Negative / Nitrite: Negative   Leuk Esterase: Negative / RBC: 1 /hpf / WBC 4 /HPF   Sq Epi: x / Non Sq Epi: 1 /hpf / Bacteria: Negative            Imaging:          
Gastroenterology/Hepatology Progress Note      Interval Events:   - 1 large bowel movement yesterday morning, none since  - no new complaints    Allergies:  No Known Allergies      Hospital Medications:  acetaminophen     Tablet .. 650 milliGRAM(s) Oral every 6 hours PRN  aluminum hydroxide/magnesium hydroxide/simethicone Suspension 30 milliLiter(s) Oral every 4 hours PRN  bisacodyl Suppository 10 milliGRAM(s) Rectal daily PRN  melatonin 3 milliGRAM(s) Oral at bedtime PRN  ondansetron Injectable 4 milliGRAM(s) IV Push every 8 hours PRN  polyethylene glycol 3350 17 Gram(s) Oral daily  senna 2 Tablet(s) Oral at bedtime  tamsulosin 0.4 milliGRAM(s) Oral at bedtime      ROS: 14 point ROS negative unless otherwise state in subjective    PHYSICAL EXAM:   Vital Signs:  Vital Signs Last 24 Hrs  T(C): 37.1 (24 Feb 2022 04:41), Max: 37.1 (24 Feb 2022 04:41)  T(F): 98.8 (24 Feb 2022 04:41), Max: 98.8 (24 Feb 2022 04:41)  HR: 73 (24 Feb 2022 10:09) (69 - 73)  BP: 110/65 (24 Feb 2022 10:09) (105/65 - 117/63)  BP(mean): --  RR: 18 (24 Feb 2022 04:41) (18 - 18)  SpO2: 96% (24 Feb 2022 10:09) (96% - 98%)  Daily     Daily     GENERAL:  No acute distress  HEENT:  NCAT, no scleral icterus  CHEST: no resp distress  HEART:  RRR  ABDOMEN:  Soft, non-tender, non-distended, normoactive bowel sounds, no masses  EXTREMITIES:  No cyanosis, clubbing, or edema  SKIN:  No rash/erythema/ecchymoses/petechiae/wounds/abscess/warm/dry  NEURO:  Alert and oriented x 3, no asterixis, no tremor    LABS:                        9.9    5.64  )-----------( 306      ( 24 Feb 2022 07:05 )             29.8     Mean Cell Volume: 91.1 fl (02-24-22 @ 07:05)    02-24    144  |  103  |  38<H>  ----------------------------<  90  3.5   |  28  |  1.80<H>    Ca    8.4      24 Feb 2022 06:58                  Imaging:          
[facility] Division of Hospital Medicine  Chantal Quevedo MD  Pager (M-F, 8A-5P): [phone]  Other Times:  [phone]    Patient is a 66y old  Male who presents with a chief complaint of abdominal pain x 1 week (24 Feb 2022 14:32)      SUBJECTIVE / OVERNIGHT EVENTS: No acute events.   ADDITIONAL REVIEW OF SYSTEMS: negative    MEDICATIONS  (STANDING):  polyethylene glycol 3350 17 Gram(s) Oral two times a day  senna 2 Tablet(s) Oral at bedtime  tamsulosin 0.4 milliGRAM(s) Oral at bedtime    MEDICATIONS  (PRN):  acetaminophen     Tablet .. 650 milliGRAM(s) Oral every 6 hours PRN Temp greater or equal to 38C (100.4F), Mild Pain (1 - 3)  aluminum hydroxide/magnesium hydroxide/simethicone Suspension 30 milliLiter(s) Oral every 4 hours PRN Dyspepsia  bisacodyl Suppository 10 milliGRAM(s) Rectal daily PRN Constipation  melatonin 3 milliGRAM(s) Oral at bedtime PRN Insomnia  ondansetron Injectable 4 milliGRAM(s) IV Push every 8 hours PRN Nausea and/or Vomiting      CAPILLARY BLOOD GLUCOSE      PHYSICAL EXAM:  Vital Signs Last 24 Hrs  T(C): 37.1 (02-26-22 @ 14:35), Max: 37.2 (02-25-22 @ 21:51)  T(F): 98.8 (02-26-22 @ 14:35), Max: 99 (02-25-22 @ 21:51)  HR: 78 (02-26-22 @ 14:35) (69 - 81)  BP: 122/71 (02-26-22 @ 14:35) (102/65 - 122/71)  RR: 18 (02-26-22 @ 14:35) (18 - 18)  SpO2: 99% (02-26-22 @ 14:35) (97% - 99%)  Wt(kg): --    CONSTITUTIONAL: NAD, well-developed, well-groomed  EYES: PERRLA; conjunctiva and sclera clear  ENMT: Moist oral mucosa, no pharyngeal injection or exudates; normal dentition  NECK: Supple, no palpable masses; no thyromegaly  RESPIRATORY: Normal respiratory effort; lungs are clear to auscultation bilaterally  CARDIOVASCULAR: Regular rate and rhythm, normal S1 and S2, no murmur/rub/gallop; No lower extremity edema; Peripheral pulses are 2+ bilaterally  ABDOMEN: Nontender to palpation, normoactive bowel sounds, no rebound/guarding; No hepatosplenomegaly  MUSCULOSKELETAL:  No clubbing or cyanosis of digits; no joint swelling or tenderness to palpation  PSYCH: A+O to person, place, and time; affect appropriate  NEUROLOGY: CN 2-12 are intact and symmetric; no gross sensory deficits   SKIN: No rashes; no palpable lesions  : +coon in place    LABS: reviewed             
UROLOGY NOTE:     Subjective: Patient seen and examined at bedside. Reports one large BM. Pt has large stool burden on CT and had markedly distended bladder, 1400cc of urine drained when coon placed. Pt now  concerned about going home with a coon.     Objective:  Vital signs  T(F): , Max: 98.8 (02-24-22 @ 21:03)  HR: 71 (02-25-22 @ 11:00)  BP: 115/73 (02-25-22 @ 11:00)  SpO2: 98% (02-25-22 @ 11:00)  Wt(kg): --    I&O's Summary    24 Feb 2022 07:01  -  25 Feb 2022 07:00  --------------------------------------------------------  IN: 0 mL / OUT: 2250 mL / NET: -2250 mL    25 Feb 2022 07:01  -  25 Feb 2022 16:49  --------------------------------------------------------  IN: 240 mL / OUT: 900 mL / NET: -660 mL    Gen: NAD  Abd: Soft, NT, ND  : Coon draining clear yellow urine     Labs:  02-25  4.66  / 28.7  /x      02-25  x     / x     /1.21                           9.5    4.66  )-----------( 313      ( 25 Feb 2022 07:19 )             28.7     02-25    143  |  102  |  24<H>  ----------------------------<  77  3.0<L>   |  28  |  1.21    Ca    8.2<L>      25 Feb 2022 07:16   
[facility] Division of Hospital Medicine  Chantal Quevedo MD  Pager (M-F, 8A-5P): [phone]  Other Times:  [phone]    Patient is a 66y old  Male who presents with a chief complaint of abdominal pain x 1 week (24 Feb 2022 14:32)      SUBJECTIVE / OVERNIGHT EVENTS: No acute events. Pt is requesting TOV and does not want to be discharged with coon. Messaged urology to discuss potential discharge options with pt. DC planning ongoing.  ADDITIONAL REVIEW OF SYSTEMS: negative    MEDICATIONS  (STANDING):  polyethylene glycol 3350 17 Gram(s) Oral two times a day  senna 2 Tablet(s) Oral at bedtime  tamsulosin 0.4 milliGRAM(s) Oral at bedtime    MEDICATIONS  (PRN):  acetaminophen     Tablet .. 650 milliGRAM(s) Oral every 6 hours PRN Temp greater or equal to 38C (100.4F), Mild Pain (1 - 3)  aluminum hydroxide/magnesium hydroxide/simethicone Suspension 30 milliLiter(s) Oral every 4 hours PRN Dyspepsia  bisacodyl Suppository 10 milliGRAM(s) Rectal daily PRN Constipation  melatonin 3 milliGRAM(s) Oral at bedtime PRN Insomnia  ondansetron Injectable 4 milliGRAM(s) IV Push every 8 hours PRN Nausea and/or Vomiting      CAPILLARY BLOOD GLUCOSE        I&O's Summary    24 Feb 2022 07:01  -  25 Feb 2022 07:00  --------------------------------------------------------  IN: 0 mL / OUT: 2250 mL / NET: -2250 mL    25 Feb 2022 07:01  -  25 Feb 2022 14:35  --------------------------------------------------------  IN: 240 mL / OUT: 900 mL / NET: -660 mL        PHYSICAL EXAM:  Vital Signs Last 24 Hrs  T(C): 36.9 (25 Feb 2022 11:00), Max: 37.1 (24 Feb 2022 21:03)  T(F): 98.4 (25 Feb 2022 11:00), Max: 98.8 (24 Feb 2022 21:03)  HR: 71 (25 Feb 2022 11:00) (64 - 74)  BP: 115/73 (25 Feb 2022 11:00) (104/65 - 121/68)  BP(mean): --  RR: 18 (25 Feb 2022 11:00) (16 - 18)  SpO2: 98% (25 Feb 2022 11:00) (98% - 100%)    CONSTITUTIONAL: NAD, well-developed, well-groomed  EYES: PERRLA; conjunctiva and sclera clear  ENMT: Moist oral mucosa, no pharyngeal injection or exudates; normal dentition  NECK: Supple, no palpable masses; no thyromegaly  RESPIRATORY: Normal respiratory effort; lungs are clear to auscultation bilaterally  CARDIOVASCULAR: Regular rate and rhythm, normal S1 and S2, no murmur/rub/gallop; No lower extremity edema; Peripheral pulses are 2+ bilaterally  ABDOMEN: Nontender to palpation, normoactive bowel sounds, no rebound/guarding; No hepatosplenomegaly  MUSCULOSKELETAL:  No clubbing or cyanosis of digits; no joint swelling or tenderness to palpation  PSYCH: A+O to person, place, and time; affect appropriate  NEUROLOGY: CN 2-12 are intact and symmetric; no gross sensory deficits   SKIN: No rashes; no palpable lesions  : +coon in place    LABS: reviewed                        9.5    4.66  )-----------( 313      ( 25 Feb 2022 07:19 )             28.7     02-25    143  |  102  |  24<H>  ----------------------------<  77  3.0<L>   |  28  |  1.21    Ca    8.2<L>      25 Feb 2022 07:16                  RADIOLOGY & ADDITIONAL TESTS:  Results Reviewed:   Imaging Personally Reviewed:  Electrocardiogram Personally Reviewed:    COORDINATION OF CARE:  Care Discussed with Consultants/Other Providers [Y/N]:  Prior or Outpatient Records Reviewed [Y/N]:  
[facility] Division of Hospital Medicine  Chelly Melara MD  Pager (M-F, 8A-5P): [phone]  Other Times:  [phone]    Patient is a 66y old  Male who presents with a chief complaint of abdominal pain x 1 week (2022 08:58)      SUBJECTIVE / OVERNIGHT EVENTS:  still with abd discomfort. had 3 bms yesterday but unsure about amount. afebrile.   no acute overnight issues.     ADDITIONAL REVIEW OF SYSTEMS: otherwise neg    MEDICATIONS  (STANDING):  polyethylene glycol 3350 17 Gram(s) Oral daily  senna 2 Tablet(s) Oral at bedtime  tamsulosin 0.4 milliGRAM(s) Oral at bedtime    MEDICATIONS  (PRN):  acetaminophen     Tablet .. 650 milliGRAM(s) Oral every 6 hours PRN Temp greater or equal to 38C (100.4F), Mild Pain (1 - 3)  aluminum hydroxide/magnesium hydroxide/simethicone Suspension 30 milliLiter(s) Oral every 4 hours PRN Dyspepsia  bisacodyl Suppository 10 milliGRAM(s) Rectal daily PRN Constipation  melatonin 3 milliGRAM(s) Oral at bedtime PRN Insomnia  ondansetron Injectable 4 milliGRAM(s) IV Push every 8 hours PRN Nausea and/or Vomiting      CAPILLARY BLOOD GLUCOSE        I&O's Summary    2022 07:  -  2022 07:00  --------------------------------------------------------  IN: 0 mL / OUT: 2400 mL / NET: -2400 mL    2022 07:01  -  2022 14:15  --------------------------------------------------------  IN: 360 mL / OUT: 900 mL / NET: -540 mL        PHYSICAL EXAM:  Vital Signs Last 24 Hrs  T(C): 36.7 (2022 11:20), Max: 36.9 (2022 21:50)  T(F): 98.1 (2022 11:20), Max: 98.5 (2022 21:50)  HR: 86 (2022 11:20) (66 - 86)  BP: 127/73 (2022 11:20) (123/71 - 134/74)  BP(mean): --  RR: 18 (2022 11:20) (17 - 18)  SpO2: 95% (2022 11:20) (95% - 98%)    CONSTITUTIONAL: NAD,   EYES:  conjunctiva and sclera clear  ENMT: Moist oral mucosa  NECK: Supple, no palpable masses; no JVD  RESPIRATORY: Normal respiratory effort; lungs are clear to auscultation bilaterally  CARDIOVASCULAR: Regular rate and rhythm, normal S1 and S2, no murmur/rub/gallop; No lower extremity edema  ABDOMEN: soft nontender. hypoactive BS. less distention   MUSCULOSKELETAL:  no clubbing or cyanosis of digits; no joint swelling or tenderness to palpation  PSYCH: A+O to person, place, and time; affect appropriate  SKIN: bilateral LE redness/ excortiation.     LABS:                        11.1   6.51  )-----------( 314      ( 2022 09:37 )             33.3     02-23    145  |  104  |  47<H>  ----------------------------<  171<H>  3.7   |  25  |  2.25<H>    Ca    8.7      2022 09:37  Phos  5.2     -  Mg     3.6     -    TPro  6.8  /  Alb  3.8  /  TBili  0.3  /  DBili  x   /  AST  22  /  ALT  18  /  AlkPhos  66  02-22          Urinalysis Basic - ( 2022 18:51 )    Color: Light Yellow / Appearance: Clear / S.010 / pH: x  Gluc: x / Ketone: Negative  / Bili: Negative / Urobili: Negative   Blood: x / Protein: Negative / Nitrite: Negative   Leuk Esterase: Negative / RBC: 1 /hpf / WBC 4 /HPF   Sq Epi: x / Non Sq Epi: 1 /hpf / Bacteria: Negative        Culture - Urine (collected 2022 21:38)  Source: Clean Catch Clean Catch (Midstream)  Final Report (2022 23:04):    <10,000 CFU/mL Normal Urogenital Stephanie        RADIOLOGY & ADDITIONAL TESTS:  Results Reviewed:   Imaging Personally Reviewed:  Electrocardiogram Personally Reviewed:    COORDINATION OF CARE:  Care Discussed with Consultants/Other Providers [Y/N]:  Prior or Outpatient Records Reviewed [Y/N]:

## 2022-02-26 NOTE — DISCHARGE NOTE NURSING/CASE MANAGEMENT/SOCIAL WORK - PATIENT PORTAL LINK FT
You can access the FollowMyHealth Patient Portal offered by [facility] by registering at the following website: [email or web address]. By joining [employer]’s FollowMyHealth portal, you will also be able to view your health information using other applications (apps) compatible with our system.

## 2022-02-26 NOTE — DISCHARGE NOTE PROVIDER - NSDCFUADDAPPT_GEN_ALL_CORE_FT
[facility]  [address] [place] [id number]  [phone]     APPTS ARE READY TO BE MADE: [ ] YES    Best Family or Patient Contact (if needed):    Additional Information about above appointments (if needed):    1: urology   2:   3:     Other comments or requests:

## 2022-02-26 NOTE — DISCHARGE NOTE PROVIDER - DETAILS OF MALNUTRITION DIAGNOSIS/DIAGNOSES
This patient has been assessed with a concern for Malnutrition and was treated during this hospitalization for the following Nutrition diagnosis/diagnoses:     -  02/24/2022: Severe protein-calorie malnutrition   -  02/24/2022: Underweight (BMI < 19)

## 2022-02-26 NOTE — DISCHARGE NOTE NURSING/CASE MANAGEMENT/SOCIAL WORK - NSDCPEFALRISK_GEN_ALL_CORE
For information on Fall & Injury Prevention, visit: [email or web address] OR  [email or web address] OR  https://www.cdc.gov/steadi/patient.html

## 2022-02-26 NOTE — PROGRESS NOTE ADULT - PROBLEM SELECTOR PROBLEM 1
NICOLETTE (acute kidney injury)

## 2022-02-26 NOTE — DISCHARGE NOTE PROVIDER - NSDCCPCAREPLAN_GEN_ALL_CORE_FT
14-May-2020 11:58 PRINCIPAL DISCHARGE DIAGNOSIS  Diagnosis: NICOLETTE (acute kidney injury)  Assessment and Plan of Treatment: 2/2 obstructive uropathy, b/l hydronephrosis on CT  also noted enlarged prostate ,s/p coon , UA neg   - Urology consult appreciated - flomax started, discharge with coon and outpatient followup         SECONDARY DISCHARGE DIAGNOSES  Diagnosis: Abdominal pain  Assessment and Plan of Treatment: large stool burden on imaging w/o obstruction, s/p enema, go lytely, suppository, now resolved  - Cont aggressive bowel regimen.      Diagnosis: HTN (hypertension)  Assessment and Plan of Treatment: Low salt diet  Activity as tolerated.  Take all medication as prescribed.  Follow up with your medical doctor for routine blood pressure monitoring at your next visit.  Notify your doctor if you have any of the following symptoms:   Dizziness, Lightheadedness, Blurry vision, Headache, Chest pain, Shortness of breath

## 2022-02-26 NOTE — PROGRESS NOTE ADULT - PROBLEM SELECTOR PLAN 1
suspect obstructive , b/l hydronephrosis on CT  also noted enlarged prostate ,s/p coon , UA neg   - maintain coon   -  consult apprciated - start flomax cont with coon ,   - s/p hyperkalemia treatment improved. d/c tele  - strict ins and outs   - avoid nephrotoxins   - monitor creatinine
suspect obstructive , b/l hydronephrosis on CT  also noted enlarged prostate ,s/p coon , UA neg   - maintain coon at least 5 days and TOV  -  consult apprciated - flomax cont with coon ,   - s/p hyperkalemia treatment improved.   - strict ins and outs   - avoid nephrotoxins   - monitor creatinine
suspect obstructive , b/l hydronephrosis on CT  also noted enlarged prostate ,s/p coon , UA neg   - maintain coon   -  consult apprciated - flomax cont with coon ,   - s/p hyperkalemia treatment improved.   - strict ins and outs   - avoid nephrotoxins   - monitor creatinine
2/2 obstructive uropathy, b/l hydronephrosis on CT  also noted enlarged prostate ,s/p coon , UA neg   - Urology consult appreciated - flomax started, discharge with coon and outpatient followup   - Now resolved
2/2 obstructive uropathy, b/l hydronephrosis on CT  also noted enlarged prostate ,s/p coon , UA neg   - Urology consult appreciated - flomax started, discharge with coon and outpatient followup   - Now resolved

## 2022-02-26 NOTE — PROGRESS NOTE ADULT - NSPROGADDITIONALINFOA_GEN_ALL_CORE
d/w ACP
Stable for discharge to home today with outpatient urology followup  Discharge time: 45 minutes    d/w UNRULY Long
d/w ACP
Stable for discharge to home today with outpatient urology followup  Discharge time: 45 minutes
d/w ACP

## 2022-02-27 RX ORDER — TAMSULOSIN HYDROCHLORIDE 0.4 MG/1
1 CAPSULE ORAL
Qty: 30 | Refills: 0
Start: 2022-02-27 | End: 2022-03-28

## 2022-03-03 PROBLEM — Z86.79 PERSONAL HISTORY OF OTHER DISEASES OF THE CIRCULATORY SYSTEM: Chronic | Status: ACTIVE | Noted: 2022-02-21

## 2022-03-10 ENCOUNTER — OUTPATIENT (OUTPATIENT)
Dept: OUTPATIENT SERVICES | Facility: HOSPITAL | Age: 67
LOS: 1 days | End: 2022-03-10
Payer: MEDICARE

## 2022-03-10 ENCOUNTER — NON-APPOINTMENT (OUTPATIENT)
Age: 67
End: 2022-03-10

## 2022-03-10 ENCOUNTER — APPOINTMENT (OUTPATIENT)
Dept: UROLOGY | Facility: CLINIC | Age: 67
End: 2022-03-10
Payer: MEDICARE

## 2022-03-10 VITALS
DIASTOLIC BLOOD PRESSURE: 70 MMHG | WEIGHT: 128 LBS | BODY MASS INDEX: 20.09 KG/M2 | HEART RATE: 69 BPM | SYSTOLIC BLOOD PRESSURE: 121 MMHG | TEMPERATURE: 98 F | HEIGHT: 67 IN

## 2022-03-10 DIAGNOSIS — Z90.89 ACQUIRED ABSENCE OF OTHER ORGANS: Chronic | ICD-10-CM

## 2022-03-10 PROCEDURE — 51702 INSERT TEMP BLADDER CATH: CPT

## 2022-03-10 PROCEDURE — 51798 US URINE CAPACITY MEASURE: CPT

## 2022-03-10 PROCEDURE — 99214 OFFICE O/P EST MOD 30 MIN: CPT | Mod: 25

## 2022-03-14 DIAGNOSIS — N40.1 BENIGN PROSTATIC HYPERPLASIA WITH LOWER URINARY TRACT SYMPTOMS: ICD-10-CM

## 2022-03-22 ENCOUNTER — OUTPATIENT (OUTPATIENT)
Dept: OUTPATIENT SERVICES | Facility: HOSPITAL | Age: 67
LOS: 1 days | End: 2022-03-22
Payer: MEDICARE

## 2022-03-22 ENCOUNTER — APPOINTMENT (OUTPATIENT)
Dept: UROLOGY | Facility: CLINIC | Age: 67
End: 2022-03-22
Payer: MEDICARE

## 2022-03-22 VITALS — HEART RATE: 71 BPM | DIASTOLIC BLOOD PRESSURE: 66 MMHG | TEMPERATURE: 97.7 F | SYSTOLIC BLOOD PRESSURE: 108 MMHG

## 2022-03-22 DIAGNOSIS — R35.0 FREQUENCY OF MICTURITION: ICD-10-CM

## 2022-03-22 DIAGNOSIS — Z90.89 ACQUIRED ABSENCE OF OTHER ORGANS: Chronic | ICD-10-CM

## 2022-03-22 PROCEDURE — 51798 US URINE CAPACITY MEASURE: CPT

## 2022-03-22 PROCEDURE — 51702 INSERT TEMP BLADDER CATH: CPT

## 2022-03-22 PROCEDURE — 99214 OFFICE O/P EST MOD 30 MIN: CPT | Mod: 25

## 2022-03-26 NOTE — DIETITIAN INITIAL EVALUATION ADULT. - WEIGHT FOR BMI (LBS)
Alert-The patient is alert, awake and responds to voice. The patient is oriented to time, place, and person. The triage nurse is able to obtain subjective information. 115

## 2022-04-01 DIAGNOSIS — N40.1 BENIGN PROSTATIC HYPERPLASIA WITH LOWER URINARY TRACT SYMPTOMS: ICD-10-CM

## 2022-04-04 ENCOUNTER — OUTPATIENT (OUTPATIENT)
Dept: OUTPATIENT SERVICES | Facility: HOSPITAL | Age: 67
LOS: 1 days | End: 2022-04-04

## 2022-04-04 VITALS
RESPIRATION RATE: 16 BRPM | TEMPERATURE: 97 F | DIASTOLIC BLOOD PRESSURE: 76 MMHG | HEIGHT: 68 IN | OXYGEN SATURATION: 97 % | SYSTOLIC BLOOD PRESSURE: 128 MMHG | WEIGHT: 138.89 LBS | HEART RATE: 77 BPM

## 2022-04-04 DIAGNOSIS — N40.1 BENIGN PROSTATIC HYPERPLASIA WITH LOWER URINARY TRACT SYMPTOMS: ICD-10-CM

## 2022-04-04 DIAGNOSIS — Z98.890 OTHER SPECIFIED POSTPROCEDURAL STATES: Chronic | ICD-10-CM

## 2022-04-04 DIAGNOSIS — Z90.89 ACQUIRED ABSENCE OF OTHER ORGANS: Chronic | ICD-10-CM

## 2022-04-04 LAB
ANION GAP SERPL CALC-SCNC: 12 MMOL/L — SIGNIFICANT CHANGE UP (ref 7–14)
BUN SERPL-MCNC: 24 MG/DL — HIGH (ref 7–23)
CALCIUM SERPL-MCNC: 9.2 MG/DL — SIGNIFICANT CHANGE UP (ref 8.4–10.5)
CHLORIDE SERPL-SCNC: 101 MMOL/L — SIGNIFICANT CHANGE UP (ref 98–107)
CO2 SERPL-SCNC: 28 MMOL/L — SIGNIFICANT CHANGE UP (ref 22–31)
CREAT SERPL-MCNC: 0.91 MG/DL — SIGNIFICANT CHANGE UP (ref 0.5–1.3)
EGFR: 93 ML/MIN/1.73M2 — SIGNIFICANT CHANGE UP
GLUCOSE SERPL-MCNC: 96 MG/DL — SIGNIFICANT CHANGE UP (ref 70–99)
HCT VFR BLD CALC: 37.2 % — LOW (ref 39–50)
HGB BLD-MCNC: 12.2 G/DL — LOW (ref 13–17)
MCHC RBC-ENTMCNC: 30.7 PG — SIGNIFICANT CHANGE UP (ref 27–34)
MCHC RBC-ENTMCNC: 32.8 GM/DL — SIGNIFICANT CHANGE UP (ref 32–36)
MCV RBC AUTO: 93.5 FL — SIGNIFICANT CHANGE UP (ref 80–100)
NRBC # BLD: 0 /100 WBCS — SIGNIFICANT CHANGE UP
NRBC # FLD: 0 K/UL — SIGNIFICANT CHANGE UP
PLATELET # BLD AUTO: 323 K/UL — SIGNIFICANT CHANGE UP (ref 150–400)
POTASSIUM SERPL-MCNC: 4.5 MMOL/L — SIGNIFICANT CHANGE UP (ref 3.5–5.3)
POTASSIUM SERPL-SCNC: 4.5 MMOL/L — SIGNIFICANT CHANGE UP (ref 3.5–5.3)
RBC # BLD: 3.98 M/UL — LOW (ref 4.2–5.8)
RBC # FLD: 14 % — SIGNIFICANT CHANGE UP (ref 10.3–14.5)
SODIUM SERPL-SCNC: 141 MMOL/L — SIGNIFICANT CHANGE UP (ref 135–145)
WBC # BLD: 6.68 K/UL — SIGNIFICANT CHANGE UP (ref 3.8–10.5)
WBC # FLD AUTO: 6.68 K/UL — SIGNIFICANT CHANGE UP (ref 3.8–10.5)

## 2022-04-04 RX ORDER — SODIUM CHLORIDE 9 MG/ML
1000 INJECTION, SOLUTION INTRAVENOUS
Refills: 0 | Status: DISCONTINUED | OUTPATIENT
Start: 2022-04-13 | End: 2022-04-27

## 2022-04-04 RX ORDER — SODIUM CHLORIDE 9 MG/ML
3 INJECTION INTRAMUSCULAR; INTRAVENOUS; SUBCUTANEOUS EVERY 8 HOURS
Refills: 0 | Status: DISCONTINUED | OUTPATIENT
Start: 2022-04-13 | End: 2022-04-27

## 2022-04-04 NOTE — H&P PST ADULT - ATTENDING COMMENTS
patient treated pre-op for contaminated urine culture (likely catheter related) with cipro for 48 hours pre-op

## 2022-04-04 NOTE — H&P PST ADULT - HISTORY OF PRESENT ILLNESS
66 year old male with hospitalization in 2/2022 for hydronephrosis secondary to obstructive uropathy and enlarged prostate. Pt had coon catheter placed and presents today for presurgical evaluation for ... 66 year old male with hospitalization in 2/2022 for hydronephrosis secondary to obstructive uropathy and enlarged prostate. Pt had coon catheter placed and presents today for presurgical evaluation for Cystoscopy, Transurethral Resection of Prostate.

## 2022-04-04 NOTE — H&P PST ADULT - ASSESSMENT
66 year old male with enlarged prostate.    Plan:    1. Enlarged Prostate with lower urinary tract symptoms  Pt scheduled for surgery on 4/13/22.  Pre-op instructions provided. Pt verbalized understanding.   Pepcid provided for GI prophylaxis.   Pt instructed to follow up with surgeon regarding preop COVID testing.

## 2022-04-04 NOTE — H&P PST ADULT - LAB RESULTS AND INTERPRETATION
cbc, bmp, urine culture done Referred To Otolaryngology For Closure Text (Leave Blank If You Do Not Want): After obtaining clear surgical margins the patient was sent to otolaryngology for surgical repair.  The patient understands they will receive post-surgical care and follow-up from the referring physician's office.

## 2022-04-05 LAB
CULTURE RESULTS: SIGNIFICANT CHANGE UP
SPECIMEN SOURCE: SIGNIFICANT CHANGE UP

## 2022-04-07 ENCOUNTER — APPOINTMENT (OUTPATIENT)
Dept: INTERNAL MEDICINE | Facility: CLINIC | Age: 67
End: 2022-04-07
Payer: MEDICARE

## 2022-04-07 ENCOUNTER — NON-APPOINTMENT (OUTPATIENT)
Age: 67
End: 2022-04-07

## 2022-04-07 VITALS
HEART RATE: 68 BPM | BODY MASS INDEX: 20.05 KG/M2 | SYSTOLIC BLOOD PRESSURE: 127 MMHG | DIASTOLIC BLOOD PRESSURE: 68 MMHG | WEIGHT: 128 LBS | OXYGEN SATURATION: 99 % | TEMPERATURE: 96.3 F | RESPIRATION RATE: 12 BRPM

## 2022-04-07 DIAGNOSIS — Z01.818 ENCOUNTER FOR OTHER PREPROCEDURAL EXAMINATION: ICD-10-CM

## 2022-04-07 DIAGNOSIS — Z86.2 PERSONAL HISTORY OF DISEASES OF THE BLOOD AND BLOOD-FORMING ORGANS AND CERTAIN DISORDERS INVOLVING THE IMMUNE MECHANISM: ICD-10-CM

## 2022-04-07 PROCEDURE — 99214 OFFICE O/P EST MOD 30 MIN: CPT | Mod: 25

## 2022-04-07 PROCEDURE — 93000 ELECTROCARDIOGRAM COMPLETE: CPT

## 2022-04-07 RX ORDER — CLOTRIMAZOLE AND BETAMETHASONE DIPROPIONATE 10; .5 MG/G; MG/G
1-0.05 CREAM TOPICAL TWICE DAILY
Qty: 1 | Refills: 0 | Status: DISCONTINUED | COMMUNITY
Start: 2020-07-29 | End: 2022-04-07

## 2022-04-07 NOTE — PLAN
[FreeTextEntry1] : 66 years old male medically stable for planned procedure. \par  Labs /EKG reviewed.\par EKG: Normal sinus rhythm at 66 bpm without acute ischemic changes.\par Patient has mild anemia.  He will follow-up for anemia work-up after the procedure.

## 2022-04-07 NOTE — PHYSICAL EXAM
[No Acute Distress] : no acute distress [Well Nourished] : well nourished [Normal Sclera/Conjunctiva] : normal sclera/conjunctiva [EOMI] : extraocular movements intact [Normal Outer Ear/Nose] : the outer ears and nose were normal in appearance [Normal Oropharynx] : the oropharynx was normal [No JVD] : no jugular venous distention [No Lymphadenopathy] : no lymphadenopathy [Supple] : supple [Thyroid Normal, No Nodules] : the thyroid was normal and there were no nodules present [No Respiratory Distress] : no respiratory distress  [No Accessory Muscle Use] : no accessory muscle use [Clear to Auscultation] : lungs were clear to auscultation bilaterally [Normal Rate] : normal rate  [Regular Rhythm] : with a regular rhythm [Normal S1, S2] : normal S1 and S2 [No Murmur] : no murmur heard [No Carotid Bruits] : no carotid bruits [Pedal Pulses Present] : the pedal pulses are present [No Edema] : there was no peripheral edema [Soft] : abdomen soft [Non Tender] : non-tender [Non-distended] : non-distended [Normal Bowel Sounds] : normal bowel sounds [Normal Posterior Cervical Nodes] : no posterior cervical lymphadenopathy [Normal Anterior Cervical Nodes] : no anterior cervical lymphadenopathy [No CVA Tenderness] : no CVA  tenderness [No Spinal Tenderness] : no spinal tenderness [No Joint Swelling] : no joint swelling [Grossly Normal Strength/Tone] : grossly normal strength/tone [No Rash] : no rash [Coordination Grossly Intact] : coordination grossly intact [No Focal Deficits] : no focal deficits [Normal Gait] : normal gait [Deep Tendon Reflexes (DTR)] : deep tendon reflexes were 2+ and symmetric [Normal Affect] : the affect was normal [Normal Insight/Judgement] : insight and judgment were intact [de-identified] : Simmons in place

## 2022-04-07 NOTE — HISTORY OF PRESENT ILLNESS
[No Pertinent Cardiac History] : no history of aortic stenosis, atrial fibrillation, coronary artery disease, recent myocardial infarction, or implantable device/pacemaker [No Pertinent Pulmonary History] : no history of asthma, COPD, sleep apnea, or smoking [No Adverse Anesthesia Reaction] : no adverse anesthesia reaction in self or family member [Aortic Stenosis] : no aortic stenosis [Atrial Fibrillation] : no atrial fibrillation [Coronary Artery Disease] : no coronary artery disease [Recent Myocardial Infarction] : no recent myocardial infarction [Implantable Device/Pacemaker] : no implantable device/pacemaker [Asthma] : no asthma [COPD] : no COPD [Sleep Apnea] : no sleep apnea [Smoker] : not a smoker [Family Member] : no family member with adverse anesthesia reaction/sudden death [Self] : no previous adverse anesthesia reaction [Chronic Anticoagulation] : no chronic anticoagulation [Chronic Kidney Disease] : no chronic kidney disease [Diabetes] : no diabetes [FreeTextEntry1] : TURP [FreeTextEntry2] : 04/13/2022 [FreeTextEntry3] : Dr. Antoni Mccormack [FreeTextEntry4] : Patient is a 66 year old male with history of BPH with urinary retention , HTN , Rheumatic fever presents for medical clearance prior to TURP\par \par He feels well, he has good functional capacity. He denies CP/SOB, dizziness

## 2022-04-08 ENCOUNTER — NON-APPOINTMENT (OUTPATIENT)
Age: 67
End: 2022-04-08

## 2022-04-11 PROBLEM — I10 ESSENTIAL (PRIMARY) HYPERTENSION: Chronic | Status: ACTIVE | Noted: 2022-02-21

## 2022-04-12 ENCOUNTER — TRANSCRIPTION ENCOUNTER (OUTPATIENT)
Age: 67
End: 2022-04-12

## 2022-04-12 NOTE — ASU PATIENT PROFILE, ADULT - FALL HARM RISK - HARM RISK INTERVENTIONS

## 2022-04-13 ENCOUNTER — APPOINTMENT (OUTPATIENT)
Dept: UROLOGY | Facility: HOSPITAL | Age: 67
End: 2022-04-13

## 2022-04-13 ENCOUNTER — TRANSCRIPTION ENCOUNTER (OUTPATIENT)
Age: 67
End: 2022-04-13

## 2022-04-13 ENCOUNTER — RESULT REVIEW (OUTPATIENT)
Age: 67
End: 2022-04-13

## 2022-04-13 ENCOUNTER — OUTPATIENT (OUTPATIENT)
Dept: OUTPATIENT SERVICES | Facility: HOSPITAL | Age: 67
LOS: 1 days | Discharge: HOME | End: 2022-04-13
Payer: MEDICARE

## 2022-04-13 VITALS
OXYGEN SATURATION: 100 % | HEIGHT: 68 IN | SYSTOLIC BLOOD PRESSURE: 137 MMHG | TEMPERATURE: 98 F | DIASTOLIC BLOOD PRESSURE: 76 MMHG | WEIGHT: 138.89 LBS | HEART RATE: 70 BPM | RESPIRATION RATE: 16 BRPM

## 2022-04-13 VITALS
HEART RATE: 64 BPM | SYSTOLIC BLOOD PRESSURE: 125 MMHG | RESPIRATION RATE: 17 BRPM | DIASTOLIC BLOOD PRESSURE: 70 MMHG | OXYGEN SATURATION: 100 %

## 2022-04-13 DIAGNOSIS — Z98.890 OTHER SPECIFIED POSTPROCEDURAL STATES: Chronic | ICD-10-CM

## 2022-04-13 DIAGNOSIS — N40.1 BENIGN PROSTATIC HYPERPLASIA WITH LOWER URINARY TRACT SYMPTOMS: ICD-10-CM

## 2022-04-13 DIAGNOSIS — Z90.89 ACQUIRED ABSENCE OF OTHER ORGANS: Chronic | ICD-10-CM

## 2022-04-13 PROCEDURE — 52601 PROSTATECTOMY (TURP): CPT

## 2022-04-13 PROCEDURE — 88305 TISSUE EXAM BY PATHOLOGIST: CPT | Mod: 26

## 2022-04-13 RX ORDER — HYDROMORPHONE HYDROCHLORIDE 2 MG/ML
0.5 INJECTION INTRAMUSCULAR; INTRAVENOUS; SUBCUTANEOUS
Refills: 0 | Status: DISCONTINUED | OUTPATIENT
Start: 2022-04-13 | End: 2022-04-13

## 2022-04-13 RX ORDER — ONDANSETRON 8 MG/1
4 TABLET, FILM COATED ORAL ONCE
Refills: 0 | Status: DISCONTINUED | OUTPATIENT
Start: 2022-04-13 | End: 2022-04-27

## 2022-04-13 NOTE — ASU DISCHARGE PLAN (ADULT/PEDIATRIC) - CALL YOUR DOCTOR IF YOU HAVE ANY OF THE FOLLOWING:
Bleeding that does not stop/Pain not relieved by Medications/Fever greater than (need to indicate Fahrenheit or Celsius)/Nausea and vomiting that does not stop/Unable to urinate Bleeding that does not stop/Pain not relieved by Medications/Fever greater than (need to indicate Fahrenheit or Celsius)/Nausea and vomiting that does not stop/Unable to urinate/Inability to tolerate liquids or foods/Increased irritability or sluggishness

## 2022-04-13 NOTE — ASU DISCHARGE PLAN (ADULT/PEDIATRIC) - NS MD DC FALL RISK RISK
For information on Fall & Injury Prevention, visit: [email or web address] OR  [email or web address] OR  https://www.cdc.gov/steadi/patient.html

## 2022-04-13 NOTE — BRIEF OPERATIVE NOTE - NSICDXBRIEFPROCEDURE_GEN_ALL_CORE_FT
PROCEDURES:  Transurethral electrosurgical resection of prostate 13-Apr-2022 11:49:02  Mateo Conti

## 2022-04-13 NOTE — ASU DISCHARGE PLAN (ADULT/PEDIATRIC) - FOLLOW UP APPOINTMENTS
may also call Recovery Room (PACU) 24/7 @ [phone]/[facility]

## 2022-04-13 NOTE — ASU DISCHARGE PLAN (ADULT/PEDIATRIC) - ASU DC SPECIAL INSTRUCTIONSFT
CATHETER: You will go home with the coon catheter. The nurses will review instructions and care before you go home. For men, you may have a prescription for lidocaine jelly to apply to the tip of your penis, as needed, for catheter related discomfort. You will have the catheter removed at your follow-up visit, tomorrow (4/14/2022).   GENERAL: It is common to have blood in your urine after your procedure. It may be pink or even red; inform your doctor if you have a significant amount of clot in the urine or if you are unable to void at all or if your catheter stops draining. The urine may clear and then become bloody again especially as you are more physically active. It is not uncommon to have some burning when you urinate, this will gradually improve. With a catheter in place, it is not uncommon to have occasional leakage or urine or blood around the catheter. Please call your urologist if this is excessive and/or the urine is not draining through the catheter into the bag.  BATHING: You may shower or bathe. If going home with coon, shower only until catheter is removed.  DIET: You may resume your regular diet and regular medication regimen.  PAIN: You may take Tylenol (acetaminophen) 650-975mg and/or Motrin (ibuprofen) 400-600mg, both available over the counter, for pain every 6 hours as needed. Do not exceed 4000mg of Tylenol (acetaminophen) daily. You may alternate these medications such that you take one or the other every 3 hours for around the clock pain coverage.  ANTIBIOTICS: Please continue the antibiotics (ciprofloxacin) prescribed before surgery.   STOOL SOFTENERS: Do not allow yourself to become constipated as straining may cause bleeding. Take stool softeners or a laxative (ex. Miralax, Colace, Senokot, ExLax, etc), available over the counter, if needed.  ACTIVITY: No heavy lifting or strenuous exercise until you are evaluated at your post-operative appointment. Otherwise, you may return to your usual level of physical activity.  ANTICOAGULATION: If you are taking any blood thinning medications, please discuss with your urologist prior to restarting these medications unless otherwise specified.  FOLLOW-UP: If you did not already schedule your post-operative appointment, please call your urologist to schedule and follow-up appointment.  CALL YOUR UROLOGIST IF: You have any bleeding that does not stop, inability to void >8 hours, fever over 100.4 F, chills, persistent nausea/vomiting, changes in your incision concerning for infection, or if your pain is not controlled on your discharge pain medications.

## 2022-04-13 NOTE — ASU DISCHARGE PLAN (ADULT/PEDIATRIC) - NURSING INSTRUCTIONS
You received IV Tylenol for pain management at _830__. Please DO NOT take any Tylenol (Acetaminophen) containing products, such as Vicodin, Percocet, Excedrin, and cold medications for the next 6 hours (until _230__ PM). DO NOT TAKE MORE THAN 3000 MG OF TYLENOL in a 24 hour period.

## 2022-04-13 NOTE — ASU DISCHARGE PLAN (ADULT/PEDIATRIC) - CARE PROVIDER_API CALL
Antoni Mccormack)  Urology  [address] [place] [address], [place] [address]  Phone: [phone]  Fax: [phone]  Scheduled Appointment: 04/14/2022

## 2022-04-14 ENCOUNTER — APPOINTMENT (OUTPATIENT)
Dept: UROLOGY | Facility: CLINIC | Age: 67
End: 2022-04-14
Payer: MEDICARE

## 2022-04-14 ENCOUNTER — OUTPATIENT (OUTPATIENT)
Dept: OUTPATIENT SERVICES | Facility: HOSPITAL | Age: 67
LOS: 1 days | End: 2022-04-14
Payer: MEDICARE

## 2022-04-14 VITALS
RESPIRATION RATE: 16 BRPM | SYSTOLIC BLOOD PRESSURE: 124 MMHG | TEMPERATURE: 97.5 F | DIASTOLIC BLOOD PRESSURE: 75 MMHG | OXYGEN SATURATION: 97 % | HEART RATE: 69 BPM

## 2022-04-14 DIAGNOSIS — Z90.89 ACQUIRED ABSENCE OF OTHER ORGANS: Chronic | ICD-10-CM

## 2022-04-14 DIAGNOSIS — Z98.890 OTHER SPECIFIED POSTPROCEDURAL STATES: Chronic | ICD-10-CM

## 2022-04-14 DIAGNOSIS — N40.1 BENIGN PROSTATIC HYPERPLASIA WITH LOWER URINARY TRACT SYMPTOMS: ICD-10-CM

## 2022-04-14 DIAGNOSIS — R35.0 FREQUENCY OF MICTURITION: ICD-10-CM

## 2022-04-14 PROCEDURE — 51700 IRRIGATION OF BLADDER: CPT | Mod: 58

## 2022-04-14 PROCEDURE — 51700 IRRIGATION OF BLADDER: CPT

## 2022-04-20 LAB — SURGICAL PATHOLOGY STUDY: SIGNIFICANT CHANGE UP

## 2022-04-26 ENCOUNTER — APPOINTMENT (OUTPATIENT)
Dept: UROLOGY | Facility: CLINIC | Age: 67
End: 2022-04-26
Payer: MEDICARE

## 2022-04-26 PROCEDURE — 51798 US URINE CAPACITY MEASURE: CPT

## 2022-04-26 PROCEDURE — 99024 POSTOP FOLLOW-UP VISIT: CPT

## 2022-04-26 RX ORDER — CIPROFLOXACIN HYDROCHLORIDE 500 MG/1
500 TABLET, FILM COATED ORAL
Qty: 10 | Refills: 0 | Status: COMPLETED | COMMUNITY
Start: 2022-04-07 | End: 2022-04-26

## 2022-04-27 ENCOUNTER — RX RENEWAL (OUTPATIENT)
Age: 67
End: 2022-04-27

## 2022-04-28 ENCOUNTER — APPOINTMENT (OUTPATIENT)
Dept: INTERNAL MEDICINE | Facility: CLINIC | Age: 67
End: 2022-04-28
Payer: MEDICARE

## 2022-04-28 VITALS
TEMPERATURE: 96.8 F | OXYGEN SATURATION: 100 % | SYSTOLIC BLOOD PRESSURE: 116 MMHG | HEART RATE: 71 BPM | DIASTOLIC BLOOD PRESSURE: 65 MMHG | RESPIRATION RATE: 14 BRPM | WEIGHT: 141 LBS | BODY MASS INDEX: 22.08 KG/M2

## 2022-04-28 PROCEDURE — 99214 OFFICE O/P EST MOD 30 MIN: CPT

## 2022-04-28 NOTE — PLAN
[FreeTextEntry1] : Follow-up \par \par BPH- s/p TURP 4/13/2022\par Following with Dr. Mccormack\par cont Flomax 0.4 mg daily \par \par Anemia\par GI referral / urology follow up \par will repeat CBC/ iron studies next visit/ pt declined blood work today\par \par Weight management, Healthy food choices, and increased physical activities discussed \par \par .

## 2022-04-28 NOTE — HISTORY OF PRESENT ILLNESS
[de-identified] : OvidionabilDrake nelson 66 year old male with h/o HTN, rheumatic fever/ BPH  presents for follow-up \par \par He report he underwent TURP procedure on 4/13/2022. He states he was discharged with a urinary catheter which was removed on Tuesday. He is also on Flomax to help with urination.  Still has pinkish urine.\par Denies CP, SOB, N/V, abdominal pain or exertional symptoms\par Patient with mild anemia on last blood work, reports last colonoscopy was  7 years ago.

## 2022-04-28 NOTE — PHYSICAL EXAM
[No Acute Distress] : no acute distress [Well Nourished] : well nourished [Normal Sclera/Conjunctiva] : normal sclera/conjunctiva [EOMI] : extraocular movements intact [Normal Outer Ear/Nose] : the outer ears and nose were normal in appearance [Normal Oropharynx] : the oropharynx was normal [No JVD] : no jugular venous distention [No Lymphadenopathy] : no lymphadenopathy [Supple] : supple [Thyroid Normal, No Nodules] : the thyroid was normal and there were no nodules present [No Respiratory Distress] : no respiratory distress  [No Accessory Muscle Use] : no accessory muscle use [Clear to Auscultation] : lungs were clear to auscultation bilaterally [Normal Rate] : normal rate  [Regular Rhythm] : with a regular rhythm [Normal S1, S2] : normal S1 and S2 [No Murmur] : no murmur heard [No Carotid Bruits] : no carotid bruits [Pedal Pulses Present] : the pedal pulses are present [No Edema] : there was no peripheral edema [Soft] : abdomen soft [Non Tender] : non-tender [Non-distended] : non-distended [Normal Bowel Sounds] : normal bowel sounds [Normal Posterior Cervical Nodes] : no posterior cervical lymphadenopathy [Normal Anterior Cervical Nodes] : no anterior cervical lymphadenopathy [No CVA Tenderness] : no CVA  tenderness [No Spinal Tenderness] : no spinal tenderness [No Joint Swelling] : no joint swelling [Grossly Normal Strength/Tone] : grossly normal strength/tone [No Rash] : no rash [Coordination Grossly Intact] : coordination grossly intact [No Focal Deficits] : no focal deficits [Normal Gait] : normal gait [Deep Tendon Reflexes (DTR)] : deep tendon reflexes were 2+ and symmetric [Normal Affect] : the affect was normal [Normal Insight/Judgement] : insight and judgment were intact

## 2022-06-17 ENCOUNTER — APPOINTMENT (OUTPATIENT)
Dept: INTERNAL MEDICINE | Facility: CLINIC | Age: 67
End: 2022-06-17

## 2022-07-01 ENCOUNTER — LABORATORY RESULT (OUTPATIENT)
Age: 67
End: 2022-07-01

## 2022-07-01 ENCOUNTER — APPOINTMENT (OUTPATIENT)
Dept: INTERNAL MEDICINE | Facility: CLINIC | Age: 67
End: 2022-07-01

## 2022-07-01 VITALS
HEIGHT: 67 IN | BODY MASS INDEX: 21.5 KG/M2 | OXYGEN SATURATION: 100 % | TEMPERATURE: 97.3 F | SYSTOLIC BLOOD PRESSURE: 124 MMHG | WEIGHT: 137 LBS | RESPIRATION RATE: 12 BRPM | DIASTOLIC BLOOD PRESSURE: 71 MMHG | HEART RATE: 77 BPM

## 2022-07-01 PROCEDURE — 99214 OFFICE O/P EST MOD 30 MIN: CPT

## 2022-07-02 ENCOUNTER — LABORATORY RESULT (OUTPATIENT)
Age: 67
End: 2022-07-02

## 2022-07-02 NOTE — HISTORY OF PRESENT ILLNESS
[de-identified] : 67 years old male with history of BPH with urinary obstruction status post transurethral resection of the prostate presents for follow-up .  Patient had mild anemia on postop blood work and wants to follow-up on that.  Last colonoscopy many years ago.  He denies any melena, rectal bleeding, abdominal pain, nausea, vomiting, weakness.  He still has mild voiding problems after the prostate surgery and following with urologist.  He also undergoing dental implants and unable to eat solid food, mostly eats soft diet.  \par He denies CP/SOB, dizziness

## 2022-07-02 NOTE — PHYSICAL EXAM
[No Acute Distress] : no acute distress [Well Nourished] : well nourished [Well Developed] : well developed [Well-Appearing] : well-appearing [Normal Sclera/Conjunctiva] : normal sclera/conjunctiva [EOMI] : extraocular movements intact [PERRL] : pupils equal round and reactive to light [Normal Outer Ear/Nose] : the outer ears and nose were normal in appearance [Normal Oropharynx] : the oropharynx was normal [Normal TMs] : both tympanic membranes were normal [No JVD] : no jugular venous distention [No Lymphadenopathy] : no lymphadenopathy [Supple] : supple [No Respiratory Distress] : no respiratory distress  [No Accessory Muscle Use] : no accessory muscle use [Clear to Auscultation] : lungs were clear to auscultation bilaterally [Normal Rate] : normal rate  [Regular Rhythm] : with a regular rhythm [Normal S1, S2] : normal S1 and S2 [No Murmur] : no murmur heard [Pedal Pulses Present] : the pedal pulses are present [No Edema] : there was no peripheral edema [Soft] : abdomen soft [Non-distended] : non-distended [Non Tender] : non-tender [Normal Bowel Sounds] : normal bowel sounds [Normal Posterior Cervical Nodes] : no posterior cervical lymphadenopathy [Normal Anterior Cervical Nodes] : no anterior cervical lymphadenopathy [No CVA Tenderness] : no CVA  tenderness [No Spinal Tenderness] : no spinal tenderness [No Joint Swelling] : no joint swelling [Grossly Normal Strength/Tone] : grossly normal strength/tone [No Rash] : no rash [No Focal Deficits] : no focal deficits [Normal Gait] : normal gait [Deep Tendon Reflexes (DTR)] : deep tendon reflexes were 2+ and symmetric [Normal Affect] : the affect was normal [Normal Insight/Judgement] : insight and judgment were intact

## 2022-07-08 ENCOUNTER — TRANSCRIPTION ENCOUNTER (OUTPATIENT)
Age: 67
End: 2022-07-08

## 2022-07-08 ENCOUNTER — APPOINTMENT (OUTPATIENT)
Dept: INTERNAL MEDICINE | Facility: CLINIC | Age: 67
End: 2022-07-08

## 2022-07-08 LAB
ALBUMIN SERPL ELPH-MCNC: 3.9 G/DL
ALP BLD-CCNC: 64 U/L
ALT SERPL-CCNC: 23 U/L
ANION GAP SERPL CALC-SCNC: 11 MMOL/L
APPEARANCE: ABNORMAL
AST SERPL-CCNC: 31 U/L
BASOPHILS # BLD AUTO: 0.06 K/UL
BASOPHILS NFR BLD AUTO: 1.4 %
BILIRUB SERPL-MCNC: 0.3 MG/DL
BILIRUBIN URINE: NEGATIVE
BLOOD URINE: NEGATIVE
BUN SERPL-MCNC: 15 MG/DL
CALCIUM SERPL-MCNC: 9.5 MG/DL
CHLORIDE SERPL-SCNC: 103 MMOL/L
CO2 SERPL-SCNC: 28 MMOL/L
COLOR: YELLOW
CREAT SERPL-MCNC: 0.88 MG/DL
EGFR: 94 ML/MIN/1.73M2
EOSINOPHIL # BLD AUTO: 0.25 K/UL
EOSINOPHIL NFR BLD AUTO: 6 %
FERRITIN SERPL-MCNC: 36 NG/ML
FOLATE SERPL-MCNC: >20 NG/ML
GLUCOSE QUALITATIVE U: NEGATIVE
GLUCOSE SERPL-MCNC: 74 MG/DL
HCT VFR BLD CALC: 38.5 %
HGB BLD-MCNC: 12.3 G/DL
IMM GRANULOCYTES NFR BLD AUTO: 0 %
IRON SATN MFR SERPL: 15 %
IRON SERPL-MCNC: 54 UG/DL
KETONES URINE: NEGATIVE
LEUKOCYTE ESTERASE URINE: ABNORMAL
LYMPHOCYTES # BLD AUTO: 1.14 K/UL
LYMPHOCYTES NFR BLD AUTO: 27.5 %
MAN DIFF?: NORMAL
MCHC RBC-ENTMCNC: 28.5 PG
MCHC RBC-ENTMCNC: 31.9 GM/DL
MCV RBC AUTO: 89.1 FL
MONOCYTES # BLD AUTO: 0.47 K/UL
MONOCYTES NFR BLD AUTO: 11.3 %
NEUTROPHILS # BLD AUTO: 2.23 K/UL
NEUTROPHILS NFR BLD AUTO: 53.8 %
NITRITE URINE: NEGATIVE
PH URINE: 6.5
PLATELET # BLD AUTO: 259 K/UL
POTASSIUM SERPL-SCNC: 4 MMOL/L
PROT SERPL-MCNC: 6.9 G/DL
PROTEIN URINE: ABNORMAL
RBC # BLD: 4.32 M/UL
RBC # FLD: 13.2 %
SODIUM SERPL-SCNC: 142 MMOL/L
SPECIFIC GRAVITY URINE: 1.02
TIBC SERPL-MCNC: 349 UG/DL
UIBC SERPL-MCNC: 295 UG/DL
UROBILINOGEN URINE: NORMAL
VIT B12 SERPL-MCNC: 1152 PG/ML
WBC # FLD AUTO: 4.15 K/UL

## 2022-07-08 PROCEDURE — 99214 OFFICE O/P EST MOD 30 MIN: CPT | Mod: 95

## 2022-07-08 NOTE — PLAN
[FreeTextEntry1] : Follow-up \par \par Anemia \par GI referral \par Fecal Occult Blood ordered \par \par UTI\par Increase Po fluids \par Start Cipro 500 mg twice daily for 5 days\par urology follow up

## 2022-07-08 NOTE — HISTORY OF PRESENT ILLNESS
[Home] : at home, [unfilled] , at the time of the visit. [Medical Office: ([facility])___] : at the medical office located in  [Verbal consent obtained from patient] : the patient, [unfilled] [de-identified] : 67 years old male with history of BPH with urinary obstruction status post transurethral resection of the prostate presents for follow-up .  \par He feels well, c/o, voiding problem after prostate surgery, denies fever, chills, blood in the urine.

## 2022-09-09 ENCOUNTER — APPOINTMENT (OUTPATIENT)
Dept: UROLOGY | Facility: CLINIC | Age: 67
End: 2022-09-09

## 2022-09-09 VITALS
DIASTOLIC BLOOD PRESSURE: 79 MMHG | HEART RATE: 71 BPM | BODY MASS INDEX: 20.83 KG/M2 | SYSTOLIC BLOOD PRESSURE: 145 MMHG | WEIGHT: 133 LBS

## 2022-09-09 DIAGNOSIS — R97.20 ELEVATED PROSTATE, SPECIFIC ANTIGEN [PSA]: ICD-10-CM

## 2022-09-09 PROCEDURE — 99214 OFFICE O/P EST MOD 30 MIN: CPT

## 2022-09-09 RX ORDER — CIPROFLOXACIN HYDROCHLORIDE 500 MG/1
500 TABLET, FILM COATED ORAL
Qty: 10 | Refills: 0 | Status: COMPLETED | COMMUNITY
Start: 2022-07-08 | End: 2022-09-09

## 2022-09-09 RX ORDER — TAMSULOSIN HYDROCHLORIDE 0.4 MG/1
0.4 CAPSULE ORAL
Qty: 90 | Refills: 0 | Status: COMPLETED | COMMUNITY
Start: 2022-04-26 | End: 2022-09-09

## 2022-09-09 NOTE — HISTORY OF PRESENT ILLNESS
[FreeTextEntry1] : Drake Reed returns to the office today.  He is a 67-year-old man with history of BPH and bladder outlet obstruction.  He underwent a transurethral resection of his prostate in April of this year.  Pathology from the surgery had shown all benign tissue.  He has noted good improvement in his urinary flow and he feels empty upon completion.  He has noted some postvoid dribbling.  He denies any stress associated urinary incontinence including with heavy lifting, coughing or sneezing.  He was treated for urinary tract infection in July of this year.  He had his urine tested but was apparently relatively asymptomatic at that time.  Enterococcus was seen in the urine and he was prescribed antibiotics.\par \par He did start to notice some bilateral groin pain after heavy lifting which he started about 6 weeks after the surgery.  He had a pre-existing right inguinal hernia known previous.  He feels discomfort however on both sides.\par

## 2022-09-09 NOTE — ASSESSMENT
[FreeTextEntry1] : The PSA level will be rechecked today to establish a new baseline post surgery.  His prior known level had been fluctuating typically between 2.5 and 5.8 over the last 4 years.  I will be in touch with him with this new result once available.\par \par Post void residual bladder scan today shows he is emptying to completion with 0 mL residual.  There is no retention.  He is asymptomatic at this time but I will reconfirm the culture today to determine if there is any asymptomatic bacteriuria present.\par \par As far as the postvoid dribbling, I advised him simply to wait a bit longer at the toilet before finishing urinating in the bathroom.  I do not think that there is any indication to use medication for this symptom and it may resolve with time.  Examination also shows the presence of a large right inguinal hernia, consistent with prior exam.  I advised the patient to seek out a general surgery consultation even if he does not immediately want a consider surgery so that he has established care.  I will plan to see him annually.

## 2022-09-11 LAB
APPEARANCE: CLEAR
BACTERIA: NEGATIVE
BILIRUBIN URINE: NEGATIVE
BLOOD URINE: ABNORMAL
CALCIUM OXALATE CRYSTALS: ABNORMAL
COLOR: NORMAL
GLUCOSE QUALITATIVE U: NEGATIVE
HYALINE CASTS: 0 /LPF
KETONES URINE: NEGATIVE
LEUKOCYTE ESTERASE URINE: ABNORMAL
MICROSCOPIC-UA: NORMAL
NITRITE URINE: NEGATIVE
PH URINE: 6
PROTEIN URINE: NEGATIVE
PSA FREE FLD-MCNC: 26 %
PSA FREE SERPL-MCNC: 0.29 NG/ML
PSA SERPL-MCNC: 1.09 NG/ML
RED BLOOD CELLS URINE: 26 /HPF
SPECIFIC GRAVITY URINE: 1.02
SQUAMOUS EPITHELIAL CELLS: 1 /HPF
UROBILINOGEN URINE: NORMAL
WHITE BLOOD CELLS URINE: 21 /HPF

## 2022-09-12 LAB — BACTERIA UR CULT: ABNORMAL

## 2023-04-20 ENCOUNTER — NON-APPOINTMENT (OUTPATIENT)
Age: 68
End: 2023-04-20

## 2023-04-21 ENCOUNTER — APPOINTMENT (OUTPATIENT)
Dept: INTERNAL MEDICINE | Facility: CLINIC | Age: 68
End: 2023-04-21
Payer: MEDICARE

## 2023-04-21 VITALS
SYSTOLIC BLOOD PRESSURE: 133 MMHG | TEMPERATURE: 97.3 F | WEIGHT: 132 LBS | DIASTOLIC BLOOD PRESSURE: 80 MMHG | BODY MASS INDEX: 20.72 KG/M2 | HEIGHT: 67 IN | OXYGEN SATURATION: 91 % | HEART RATE: 69 BPM | RESPIRATION RATE: 12 BRPM

## 2023-04-21 DIAGNOSIS — K40.90 UNILATERAL INGUINAL HERNIA, W/OUT OBSTRUCTION OR GANGRENE, NOT SPECIFIED AS RECURRENT: ICD-10-CM

## 2023-04-21 DIAGNOSIS — Z00.00 ENCOUNTER FOR GENERAL ADULT MEDICAL EXAMINATION W/OUT ABNORMAL FINDINGS: ICD-10-CM

## 2023-04-21 PROCEDURE — 99397 PER PM REEVAL EST PAT 65+ YR: CPT

## 2023-04-21 NOTE — PHYSICAL EXAM
[No Acute Distress] : no acute distress [Normal Sclera/Conjunctiva] : normal sclera/conjunctiva [EOMI] : extraocular movements intact [Normal Outer Ear/Nose] : the outer ears and nose were normal in appearance [Normal Oropharynx] : the oropharynx was normal [No JVD] : no jugular venous distention [No Lymphadenopathy] : no lymphadenopathy [Supple] : supple [Thyroid Normal, No Nodules] : the thyroid was normal and there were no nodules present [No Respiratory Distress] : no respiratory distress  [No Accessory Muscle Use] : no accessory muscle use [Clear to Auscultation] : lungs were clear to auscultation bilaterally [Normal Rate] : normal rate  [Regular Rhythm] : with a regular rhythm [Normal S1, S2] : normal S1 and S2 [No Murmur] : no murmur heard [No Carotid Bruits] : no carotid bruits [Pedal Pulses Present] : the pedal pulses are present [No Edema] : there was no peripheral edema [Soft] : abdomen soft [Non Tender] : non-tender [Non-distended] : non-distended [Normal Bowel Sounds] : normal bowel sounds [No CVA Tenderness] : no CVA  tenderness [No Spinal Tenderness] : no spinal tenderness [No Joint Swelling] : no joint swelling [Grossly Normal Strength/Tone] : grossly normal strength/tone [No Rash] : no rash [Coordination Grossly Intact] : coordination grossly intact [No Focal Deficits] : no focal deficits [Normal Gait] : normal gait [Normal Insight/Judgement] : insight and judgment were intact [Normal Affect] : the affect was normal [de-identified] : + right inguinal hernia

## 2023-04-21 NOTE — PLAN
[FreeTextEntry1] : Physical \par \par Declines GI referral- will check fecal occult blood \par \par BPH- s/p TURP 4/13/2022\par Following with Dr. Mccormack\par \par Right inguinal hernia \par Avoid Heavy lifting or straining \par Declines Surgery referral at this time \par \par Weight management, Healthy food choices, and increased physical active discussed \par \par Labs ordered pt to follow-up in 1 week for results \par .

## 2023-04-21 NOTE — HEALTH RISK ASSESSMENT
[Good] : ~his/her~  mood as  good [No] : No [No falls in past year] : Patient reported no falls in the past year [0] : 2) Feeling down, depressed, or hopeless: Not at all (0) [Patient reported colonoscopy was normal] : Patient reported colonoscopy was normal [None] : None [Alone] : lives alone [Retired] : retired [Feels Safe at Home] : Feels safe at home [Reports normal functional visual acuity (ie: able to read med bottle)] : Reports normal functional visual acuity [Smoke Detector] : smoke detector [Carbon Monoxide Detector] : carbon monoxide detector [Safety elements used in home] : safety elements used in home [Seat Belt] :  uses seat belt [Never] : Never [Reports changes in hearing] : Reports no changes in hearing [Reports changes in vision] : Reports no changes in vision [Reports changes in dental health] : Reports no changes in dental health [Guns at Home] : no guns at home [Sunscreen] : does not use sunscreen [Travel to Developing Areas] : does not  travel to developing areas [TB Exposure] : is not being exposed to tuberculosis [Caregiver Concerns] : does not have caregiver concerns [ColonoscopyDate] : years ago

## 2023-04-21 NOTE — HISTORY OF PRESENT ILLNESS
[de-identified] : Drake Reed 67 year old male with h/o HTN, rheumatic fever,BPH s/p TURP 4/2022 presents for physical \par \par He feels well \par He reports doing well with his diet and remains active \par Denies CP, SOB, N/V, abdominal pain or exertional symptoms\par \par Patient reports on Monday and Wednesday he reports having  some right groin discomfort but has since subsided. of note patient does have history of right inguinal hernia

## 2023-04-27 DIAGNOSIS — R82.90 UNSPECIFIED ABNORMAL FINDINGS IN URINE: ICD-10-CM

## 2023-05-01 LAB
25(OH)D3 SERPL-MCNC: 60.9 NG/ML
ALBUMIN SERPL ELPH-MCNC: 4.2 G/DL
ALP BLD-CCNC: 65 U/L
ALT SERPL-CCNC: 22 U/L
ANION GAP SERPL CALC-SCNC: 13 MMOL/L
APPEARANCE: ABNORMAL
AST SERPL-CCNC: 26 U/L
BACTERIA: ABNORMAL
BASOPHILS # BLD AUTO: 0.08 K/UL
BASOPHILS NFR BLD AUTO: 1.6 %
BILIRUB SERPL-MCNC: 0.3 MG/DL
BILIRUBIN URINE: NEGATIVE
BLOOD URINE: NEGATIVE
BUN SERPL-MCNC: 23 MG/DL
CALCIUM SERPL-MCNC: 9.2 MG/DL
CHLORIDE SERPL-SCNC: 104 MMOL/L
CHOLEST SERPL-MCNC: 196 MG/DL
CO2 SERPL-SCNC: 28 MMOL/L
COLOR: NORMAL
CREAT SERPL-MCNC: 1.14 MG/DL
EGFR: 70 ML/MIN/1.73M2
EOSINOPHIL # BLD AUTO: 0.33 K/UL
EOSINOPHIL NFR BLD AUTO: 6.5 %
ESTIMATED AVERAGE GLUCOSE: 111 MG/DL
GLUCOSE QUALITATIVE U: NEGATIVE
GLUCOSE SERPL-MCNC: 88 MG/DL
HBA1C MFR BLD HPLC: 5.5 %
HCT VFR BLD CALC: 37.9 %
HDLC SERPL-MCNC: 80 MG/DL
HEMOCCULT STL QL IA: NEGATIVE
HGB BLD-MCNC: 12.2 G/DL
HYALINE CASTS: 3 /LPF
IMM GRANULOCYTES NFR BLD AUTO: 0.2 %
KETONES URINE: NEGATIVE
LDLC SERPL CALC-MCNC: 102 MG/DL
LEUKOCYTE ESTERASE URINE: ABNORMAL
LYMPHOCYTES # BLD AUTO: 1.23 K/UL
LYMPHOCYTES NFR BLD AUTO: 24.3 %
MAN DIFF?: NORMAL
MCHC RBC-ENTMCNC: 29.5 PG
MCHC RBC-ENTMCNC: 32.2 GM/DL
MCV RBC AUTO: 91.5 FL
MICROSCOPIC-UA: NORMAL
MONOCYTES # BLD AUTO: 0.4 K/UL
MONOCYTES NFR BLD AUTO: 7.9 %
NEUTROPHILS # BLD AUTO: 3.01 K/UL
NEUTROPHILS NFR BLD AUTO: 59.5 %
NITRITE URINE: NEGATIVE
NONHDLC SERPL-MCNC: 117 MG/DL
PH URINE: 6
PLATELET # BLD AUTO: 299 K/UL
POTASSIUM SERPL-SCNC: 4.4 MMOL/L
PROT SERPL-MCNC: 6.9 G/DL
PROTEIN URINE: NORMAL
PSA SERPL-MCNC: 1.14 NG/ML
RBC # BLD: 4.14 M/UL
RBC # FLD: 14.2 %
RED BLOOD CELLS URINE: 4 /HPF
SODIUM SERPL-SCNC: 145 MMOL/L
SPECIFIC GRAVITY URINE: 1.01
SQUAMOUS EPITHELIAL CELLS: 0 /HPF
TRIGL SERPL-MCNC: 73 MG/DL
TSH SERPL-ACNC: 1.98 UIU/ML
UROBILINOGEN URINE: NORMAL
VIT B12 SERPL-MCNC: 476 PG/ML
WBC # FLD AUTO: 5.06 K/UL
WHITE BLOOD CELLS URINE: 61 /HPF

## 2023-05-04 LAB
APPEARANCE: ABNORMAL
BACTERIA: NEGATIVE /HPF
BILIRUBIN URINE: NEGATIVE
BLOOD URINE: ABNORMAL
CALCIUM OXALATE CRYSTALS: PRESENT
CAST: 0 /LPF
COLOR: YELLOW
EPITHELIAL CELLS: 3 /HPF
GLUCOSE QUALITATIVE U: NEGATIVE MG/DL
KETONES URINE: NEGATIVE MG/DL
LEUKOCYTE ESTERASE URINE: ABNORMAL
MICROSCOPIC-UA: NORMAL
NITRITE URINE: NEGATIVE
PH URINE: 5.5
PROTEIN URINE: NEGATIVE MG/DL
RED BLOOD CELLS URINE: 19 /HPF
REVIEW: NORMAL
SPECIFIC GRAVITY URINE: 1.02
UROBILINOGEN URINE: 0.2 MG/DL
WHITE BLOOD CELLS URINE: 18 /HPF

## 2023-06-23 ENCOUNTER — APPOINTMENT (OUTPATIENT)
Dept: GASTROENTEROLOGY | Facility: CLINIC | Age: 68
End: 2023-06-23
Payer: MEDICARE

## 2023-06-23 VITALS
HEIGHT: 67 IN | RESPIRATION RATE: 12 BRPM | WEIGHT: 120 LBS | SYSTOLIC BLOOD PRESSURE: 107 MMHG | BODY MASS INDEX: 18.83 KG/M2 | HEART RATE: 73 BPM | DIASTOLIC BLOOD PRESSURE: 62 MMHG | OXYGEN SATURATION: 97 %

## 2023-06-23 DIAGNOSIS — R11.0 NAUSEA: ICD-10-CM

## 2023-06-23 DIAGNOSIS — F41.9 ANXIETY DISORDER, UNSPECIFIED: ICD-10-CM

## 2023-06-23 DIAGNOSIS — D64.9 ANEMIA, UNSPECIFIED: ICD-10-CM

## 2023-06-23 DIAGNOSIS — R10.13 EPIGASTRIC PAIN: ICD-10-CM

## 2023-06-23 PROCEDURE — 99203 OFFICE O/P NEW LOW 30 MIN: CPT | Mod: 25

## 2023-06-23 PROCEDURE — 36415 COLL VENOUS BLD VENIPUNCTURE: CPT

## 2023-06-23 PROCEDURE — 99213 OFFICE O/P EST LOW 20 MIN: CPT | Mod: 25

## 2023-06-23 NOTE — HISTORY OF PRESENT ILLNESS
[FreeTextEntry1] : 67 yo male, referred for evaluation of anemia. Recent weight loss of 12lbs. Complains of fatigue and nausea.  Pt does not eat meat or chicken. Hb 12.3 c/w 12.2 last year. FOB immunology negative. Fit neg over last 5 years.No colonoscopy in past.

## 2023-06-23 NOTE — ASSESSMENT
[FreeTextEntry1] : 67 yo male, referred for evaluation of anemia. Recent weight loss of 12lbs. Complains of fatigue and nausea.  Pt does not eat meat or chicken. Hb 12.3 c/w 12.2 last year. FOB immunology negative. Fit neg over last 5 years.No colonoscopy in past.\par \par IMP:\par 1. nausea\par 2. weight loss\par 3. CHronic anemia, with FIT neg stool- r/o thal herlinda\par \par PLAN:\par 1. Hb electrophoresis\par 2. He  was advised to undergo endoscopy to which he agreed. The procedure will be performed in [facility] with the assistance of an anesthesiologist. The procedure was explained in detail and he understood the risks of the procedure not limited  to infection, bleeding, perforation, risk of anesthesia and risk of IV site problems,emergency surgery, missed lesions  or non-diagnosis of stomach or esophageal  cancer.He/She]  was advised that he could not drive home alone, if the patient chooses to receive sedation. Further diagnostic and treatment recommendations will be based upon the procedure and any biopsies, if they are taken.\par 3. esomeprazole 40 mg

## 2023-06-23 NOTE — PHYSICAL EXAM
[Alert] : alert [Healthy Appearing] : healthy appearing [No Acute Distress] : no acute distress [Underweight (BMI <= 18.5)] : underweight (BMI <= 18.5) [Sclera] : the sclera and conjunctiva were normal [Hearing Threshold Finger Rub Not Fleming] : hearing was normal [Normal Lips/Gums] : the lips and gums were normal [Normal Appearance] : the appearance of the neck was normal [Oropharynx] : the oropharynx was normal [No Neck Mass] : no neck mass was observed [No Respiratory Distress] : no respiratory distress [No Acc Muscle Use] : no accessory muscle use [Auscultation Breath Sounds / Voice Sounds] : lungs were clear to auscultation bilaterally [Respiration, Rhythm And Depth] : normal respiratory rhythm and effort [Heart Rate And Rhythm] : heart rate was normal and rhythm regular [Normal S1, S2] : normal S1 and S2 [Murmurs] : no murmurs [Bowel Sounds] : normal bowel sounds [Abdomen Tenderness] : non-tender [No Masses] : no abdominal mass palpated [Abdomen Soft] : soft [Oriented To Time, Place, And Person] : oriented to person, place, and time [] : no hepatosplenomegaly

## 2023-06-27 ENCOUNTER — NON-APPOINTMENT (OUTPATIENT)
Age: 68
End: 2023-06-27

## 2023-06-27 ENCOUNTER — TRANSCRIPTION ENCOUNTER (OUTPATIENT)
Age: 68
End: 2023-06-27

## 2023-06-27 LAB
HGB A MFR BLD: 97.4 %
HGB A2 MFR BLD: 2.6 %
HGB FRACT BLD-IMP: NORMAL

## 2023-06-30 ENCOUNTER — INPATIENT (INPATIENT)
Facility: HOSPITAL | Age: 68
LOS: 3 days | Discharge: HOME | DRG: 683 | End: 2023-07-04
Attending: STUDENT IN AN ORGANIZED HEALTH CARE EDUCATION/TRAINING PROGRAM | Admitting: STUDENT IN AN ORGANIZED HEALTH CARE EDUCATION/TRAINING PROGRAM
Payer: MEDICARE

## 2023-06-30 VITALS
HEIGHT: 67 IN | TEMPERATURE: 98 F | WEIGHT: 130.07 LBS | SYSTOLIC BLOOD PRESSURE: 122 MMHG | RESPIRATION RATE: 17 BRPM | OXYGEN SATURATION: 98 % | HEART RATE: 83 BPM | DIASTOLIC BLOOD PRESSURE: 79 MMHG

## 2023-06-30 DIAGNOSIS — Z98.890 OTHER SPECIFIED POSTPROCEDURAL STATES: Chronic | ICD-10-CM

## 2023-06-30 DIAGNOSIS — Z90.89 ACQUIRED ABSENCE OF OTHER ORGANS: Chronic | ICD-10-CM

## 2023-06-30 LAB
ALBUMIN SERPL ELPH-MCNC: 4.6 G/DL — SIGNIFICANT CHANGE UP (ref 3.3–5)
ALP SERPL-CCNC: 69 U/L — SIGNIFICANT CHANGE UP (ref 40–120)
ALT FLD-CCNC: 20 U/L — SIGNIFICANT CHANGE UP (ref 10–45)
ANION GAP SERPL CALC-SCNC: 14 MMOL/L — SIGNIFICANT CHANGE UP (ref 5–17)
ANION GAP SERPL CALC-SCNC: 15 MMOL/L — SIGNIFICANT CHANGE UP (ref 5–17)
APPEARANCE UR: CLEAR — SIGNIFICANT CHANGE UP
APTT BLD: 29.3 SEC — SIGNIFICANT CHANGE UP (ref 27.5–35.5)
AST SERPL-CCNC: 19 U/L — SIGNIFICANT CHANGE UP (ref 10–40)
BACTERIA # UR AUTO: NEGATIVE — SIGNIFICANT CHANGE UP
BASE EXCESS BLDV CALC-SCNC: 7.6 MMOL/L — HIGH (ref -2–3)
BASOPHILS # BLD AUTO: 0.05 K/UL — SIGNIFICANT CHANGE UP (ref 0–0.2)
BASOPHILS NFR BLD AUTO: 0.6 % — SIGNIFICANT CHANGE UP (ref 0–2)
BILIRUB SERPL-MCNC: 0.3 MG/DL — SIGNIFICANT CHANGE UP (ref 0.2–1.2)
BILIRUB UR-MCNC: NEGATIVE — SIGNIFICANT CHANGE UP
BUN SERPL-MCNC: 28 MG/DL — HIGH (ref 7–23)
BUN SERPL-MCNC: 37 MG/DL — HIGH (ref 7–23)
CA-I SERPL-SCNC: 1.14 MMOL/L — LOW (ref 1.15–1.33)
CALCIUM SERPL-MCNC: 10.2 MG/DL — SIGNIFICANT CHANGE UP (ref 8.4–10.5)
CALCIUM SERPL-MCNC: 8 MG/DL — LOW (ref 8.4–10.5)
CHLORIDE BLDV-SCNC: 97 MMOL/L — SIGNIFICANT CHANGE UP (ref 96–108)
CHLORIDE SERPL-SCNC: 101 MMOL/L — SIGNIFICANT CHANGE UP (ref 96–108)
CHLORIDE SERPL-SCNC: 96 MMOL/L — SIGNIFICANT CHANGE UP (ref 96–108)
CO2 BLDV-SCNC: 35 MMOL/L — HIGH (ref 22–26)
CO2 SERPL-SCNC: 23 MMOL/L — SIGNIFICANT CHANGE UP (ref 22–31)
CO2 SERPL-SCNC: 26 MMOL/L — SIGNIFICANT CHANGE UP (ref 22–31)
COLOR SPEC: SIGNIFICANT CHANGE UP
CREAT SERPL-MCNC: 2.67 MG/DL — HIGH (ref 0.5–1.3)
CREAT SERPL-MCNC: 3.37 MG/DL — HIGH (ref 0.5–1.3)
DIFF PNL FLD: NEGATIVE — SIGNIFICANT CHANGE UP
EGFR: 19 ML/MIN/1.73M2 — LOW
EGFR: 25 ML/MIN/1.73M2 — LOW
EOSINOPHIL # BLD AUTO: 0.09 K/UL — SIGNIFICANT CHANGE UP (ref 0–0.5)
EOSINOPHIL NFR BLD AUTO: 1.1 % — SIGNIFICANT CHANGE UP (ref 0–6)
EPI CELLS # UR: 0 /HPF — SIGNIFICANT CHANGE UP
GAS PNL BLDV: 133 MMOL/L — LOW (ref 136–145)
GAS PNL BLDV: SIGNIFICANT CHANGE UP
GLUCOSE BLDV-MCNC: 129 MG/DL — HIGH (ref 70–99)
GLUCOSE SERPL-MCNC: 134 MG/DL — HIGH (ref 70–99)
GLUCOSE SERPL-MCNC: 83 MG/DL — SIGNIFICANT CHANGE UP (ref 70–99)
GLUCOSE UR QL: NEGATIVE — SIGNIFICANT CHANGE UP
HCO3 BLDV-SCNC: 33 MMOL/L — HIGH (ref 22–29)
HCT VFR BLD CALC: 35.3 % — LOW (ref 39–50)
HCT VFR BLDA CALC: 36 % — LOW (ref 39–51)
HGB BLD CALC-MCNC: 12 G/DL — LOW (ref 12.6–17.4)
HGB BLD-MCNC: 11.6 G/DL — LOW (ref 13–17)
HYALINE CASTS # UR AUTO: 0 /LPF — SIGNIFICANT CHANGE UP (ref 0–2)
IMM GRANULOCYTES NFR BLD AUTO: 0.4 % — SIGNIFICANT CHANGE UP (ref 0–0.9)
INR BLD: 1.06 RATIO — SIGNIFICANT CHANGE UP (ref 0.88–1.16)
KETONES UR-MCNC: NEGATIVE — SIGNIFICANT CHANGE UP
LACTATE BLDV-MCNC: 1 MMOL/L — SIGNIFICANT CHANGE UP (ref 0.5–2)
LEUKOCYTE ESTERASE UR-ACNC: ABNORMAL
LIDOCAIN IGE QN: 54 U/L — SIGNIFICANT CHANGE UP (ref 7–60)
LYMPHOCYTES # BLD AUTO: 0.94 K/UL — LOW (ref 1–3.3)
LYMPHOCYTES # BLD AUTO: 11.2 % — LOW (ref 13–44)
MCHC RBC-ENTMCNC: 29.2 PG — SIGNIFICANT CHANGE UP (ref 27–34)
MCHC RBC-ENTMCNC: 32.9 GM/DL — SIGNIFICANT CHANGE UP (ref 32–36)
MCV RBC AUTO: 88.9 FL — SIGNIFICANT CHANGE UP (ref 80–100)
MONOCYTES # BLD AUTO: 0.34 K/UL — SIGNIFICANT CHANGE UP (ref 0–0.9)
MONOCYTES NFR BLD AUTO: 4 % — SIGNIFICANT CHANGE UP (ref 2–14)
NEUTROPHILS # BLD AUTO: 6.97 K/UL — SIGNIFICANT CHANGE UP (ref 1.8–7.4)
NEUTROPHILS NFR BLD AUTO: 82.7 % — HIGH (ref 43–77)
NITRITE UR-MCNC: NEGATIVE — SIGNIFICANT CHANGE UP
NRBC # BLD: 0 /100 WBCS — SIGNIFICANT CHANGE UP (ref 0–0)
PCO2 BLDV: 50 MMHG — SIGNIFICANT CHANGE UP (ref 42–55)
PH BLDV: 7.43 — SIGNIFICANT CHANGE UP (ref 7.32–7.43)
PH UR: 7 — SIGNIFICANT CHANGE UP (ref 5–8)
PLATELET # BLD AUTO: 283 K/UL — SIGNIFICANT CHANGE UP (ref 150–400)
PO2 BLDV: 21 MMHG — LOW (ref 25–45)
POTASSIUM BLDV-SCNC: 5.1 MMOL/L — SIGNIFICANT CHANGE UP (ref 3.5–5.1)
POTASSIUM SERPL-MCNC: 4.6 MMOL/L — SIGNIFICANT CHANGE UP (ref 3.5–5.3)
POTASSIUM SERPL-MCNC: 4.7 MMOL/L — SIGNIFICANT CHANGE UP (ref 3.5–5.3)
POTASSIUM SERPL-SCNC: 4.6 MMOL/L — SIGNIFICANT CHANGE UP (ref 3.5–5.3)
POTASSIUM SERPL-SCNC: 4.7 MMOL/L — SIGNIFICANT CHANGE UP (ref 3.5–5.3)
PROT SERPL-MCNC: 8.2 G/DL — SIGNIFICANT CHANGE UP (ref 6–8.3)
PROT UR-MCNC: ABNORMAL
PROTHROM AB SERPL-ACNC: 12.3 SEC — SIGNIFICANT CHANGE UP (ref 10.5–13.4)
RBC # BLD: 3.97 M/UL — LOW (ref 4.2–5.8)
RBC # FLD: 13.1 % — SIGNIFICANT CHANGE UP (ref 10.3–14.5)
RBC CASTS # UR COMP ASSIST: 2 /HPF — SIGNIFICANT CHANGE UP (ref 0–4)
SAO2 % BLDV: 31.4 % — LOW (ref 67–88)
SODIUM SERPL-SCNC: 137 MMOL/L — SIGNIFICANT CHANGE UP (ref 135–145)
SODIUM SERPL-SCNC: 138 MMOL/L — SIGNIFICANT CHANGE UP (ref 135–145)
SP GR SPEC: 1.01 — SIGNIFICANT CHANGE UP (ref 1.01–1.02)
UROBILINOGEN FLD QL: NEGATIVE — SIGNIFICANT CHANGE UP
WBC # BLD: 8.42 K/UL — SIGNIFICANT CHANGE UP (ref 3.8–10.5)
WBC # FLD AUTO: 8.42 K/UL — SIGNIFICANT CHANGE UP (ref 3.8–10.5)
WBC UR QL: 39 /HPF — HIGH (ref 0–5)

## 2023-06-30 PROCEDURE — 99223 1ST HOSP IP/OBS HIGH 75: CPT

## 2023-06-30 PROCEDURE — 74176 CT ABD & PELVIS W/O CONTRAST: CPT | Mod: 26,MA

## 2023-06-30 RX ORDER — ACETAMINOPHEN 500 MG
1000 TABLET ORAL ONCE
Refills: 0 | Status: COMPLETED | OUTPATIENT
Start: 2023-06-30 | End: 2023-06-30

## 2023-06-30 RX ORDER — POLYETHYLENE GLYCOL 3350 17 G/17G
17 POWDER, FOR SOLUTION ORAL DAILY
Refills: 0 | Status: DISCONTINUED | OUTPATIENT
Start: 2023-06-30 | End: 2023-07-01

## 2023-06-30 RX ORDER — SODIUM CHLORIDE 9 MG/ML
1000 INJECTION, SOLUTION INTRAVENOUS
Refills: 0 | Status: DISCONTINUED | OUTPATIENT
Start: 2023-06-30 | End: 2023-07-02

## 2023-06-30 RX ORDER — SODIUM CHLORIDE 9 MG/ML
1000 INJECTION INTRAMUSCULAR; INTRAVENOUS; SUBCUTANEOUS ONCE
Refills: 0 | Status: COMPLETED | OUTPATIENT
Start: 2023-06-30 | End: 2023-06-30

## 2023-06-30 RX ORDER — CEFTRIAXONE 500 MG/1
1000 INJECTION, POWDER, FOR SOLUTION INTRAMUSCULAR; INTRAVENOUS ONCE
Refills: 0 | Status: COMPLETED | OUTPATIENT
Start: 2023-06-30 | End: 2023-06-30

## 2023-06-30 RX ORDER — PANTOPRAZOLE SODIUM 20 MG/1
40 TABLET, DELAYED RELEASE ORAL
Refills: 0 | Status: DISCONTINUED | OUTPATIENT
Start: 2023-06-30 | End: 2023-07-04

## 2023-06-30 RX ADMIN — POLYETHYLENE GLYCOL 3350 17 GRAM(S): 17 POWDER, FOR SOLUTION ORAL at 13:54

## 2023-06-30 RX ADMIN — SODIUM CHLORIDE 1000 MILLILITER(S): 9 INJECTION INTRAMUSCULAR; INTRAVENOUS; SUBCUTANEOUS at 10:57

## 2023-06-30 RX ADMIN — Medication 1000 MILLIGRAM(S): at 10:55

## 2023-06-30 RX ADMIN — CEFTRIAXONE 1000 MILLIGRAM(S): 500 INJECTION, POWDER, FOR SOLUTION INTRAMUSCULAR; INTRAVENOUS at 10:55

## 2023-06-30 RX ADMIN — SODIUM CHLORIDE 100 MILLILITER(S): 9 INJECTION, SOLUTION INTRAVENOUS at 23:05

## 2023-06-30 RX ADMIN — CEFTRIAXONE 100 MILLIGRAM(S): 500 INJECTION, POWDER, FOR SOLUTION INTRAMUSCULAR; INTRAVENOUS at 09:32

## 2023-06-30 RX ADMIN — Medication 400 MILLIGRAM(S): at 06:29

## 2023-06-30 RX ADMIN — SODIUM CHLORIDE 100 MILLILITER(S): 9 INJECTION, SOLUTION INTRAVENOUS at 14:00

## 2023-06-30 NOTE — ED ADULT NURSE NOTE - OBJECTIVE STATEMENT
68 y.o M PMH HTN presenting to the ED for RLQ pain that started last night after eating salmon that he thinks might have been rotten at around 2200. Pt endorses some nausea at home that has now resolved. Pain got progressively worse prompting him to come into the ED. Pt denies fever/chills, H/A, lightheadedness/dizziness, vision changes, SOB, chest pain, vomiting, diarrhea constipation, dysuria, hematuria, hematochezia, weakness, numbness, and tingling. AOx4, MAEx4, respirations even and unlabored, abd soft nondistended, skin warm dry and normal for race. Patient safety maintained, bed is in lowest position, wheels locked, and side rails raised. Patient oriented to call bell, and call bell is within reach.

## 2023-06-30 NOTE — ED PROVIDER NOTE - CARE PLAN
1 Principal Discharge DX:	Abdominal pain   Principal Discharge DX:	NICOLETTE (acute kidney injury)  Secondary Diagnosis:	Right inguinal hernia

## 2023-06-30 NOTE — ED CDU PROVIDER INITIAL DAY NOTE - PROGRESS NOTE DETAILS
Per patient and chart review, he had a similar episode in February of this year.  He presented to the ER at that time and was found to be constipated and in urinary retention with creatinine of 5.  Simmons catheter was placed and creatinine normalized.  Patient followed up with urology and had a TURP for enlarged prostate.  Patient states that he has not had any complications since then.  Yesterday he did notice some mild difficulty urinating, but that has since resolved.   we will hold off on Simmons catheter for now and continue to monitor I's and O's. -Rayo Turcios PA-C Patient was picking up remote from the floor when he said that he fell on his L shoulder.   States that he has slight pain with palpation of shoulder although +FROM, with no obvious deformity. Declines xray.   States that he did not fall out of bed and denies hitting head. States that his only medication he takes is for acid reflux, denying blood thinner use. Patient otherwise well appearing, no acute distress, moving extremities, clear speech, denying pain. Discussed with CDU attending Peace, will continue to monitor. - Lexi Dumont PA-C Patient was picking up remote from the floor, when he said that he fell on his L shoulder.   States that he has slight pain with palpation of shoulder although +FROM, with no obvious deformity. Patient declines xray.   States that he did not fall out of bed and denies hitting head. States that his only medication he takes is for acid reflux, denying blood thinner use. Patient otherwise well appearing, no acute distress, moving extremities, clear speech, denying pain. A+Ox3. Discussed with CDU attending Peace, will continue to monitor. - Lexi Dumont PA-C

## 2023-06-30 NOTE — ED PROVIDER NOTE - PROGRESS NOTE DETAILS
Dr. Kendrick: Received signout from Dr. Allen.  68-year-old male with chronic right inguinal hernia, nonreducible, minimally tender, no erythema or warmth.  Patient awaiting CT.  Labs reviewed, found to have acute renal failure with creatinine 3.37.  Previous labs reviewed, patient had creatinine of 5 in the past which had improved to 0.91, today it is 3.37.  Patient informed of NICOLETTE requiring admission, will follow-up CT Noncon abdomen pelvis to r/o surgical pathology. Surgery paged for recs on hernia    Leanna Doshi MD, PGY2 D/w surgery, hernia reducible, outpt follow up. D/w CDU PA, will place in CDU for IV hydration and trending creatinine.

## 2023-06-30 NOTE — CONSULT NOTE ADULT - ASSESSMENT
Patient is a 67 yo M w/ PMHx of HTN not on meds, GERD, known R inguinal hernia, presents to the ED with right sided abdominal pain that started at 6PM after eating salmon for dinner. Reports pain was sharp, right side of abdomen radiating to groin. General surgery consulted for evaluation of fat containing R inguinal hernia. Clinically non-obstructed.        Recommendations:   - No acute surgical intervention indicated  - Please have patient follow up with Dr. Carolina Galarza outpatient for evaluation and plan for scheduled repair when NICOLETTE resolves   - Dispo per ED     Plan discussed with Dr. Murphy       ACS  [id number]

## 2023-06-30 NOTE — ED ADULT TRIAGE NOTE - AS TEMP SITE
"Patient c/o worsening diarrhea and abdominal cramping x 1 week, started vomiting today. Unable to tolerate fluids or medications. Found to have blood in stool during PCP visit earlier this week. Reviewed protocol with patient's niece (POA). Advised to take patient to ER now. POA verbalizes agreement with plan.    Reason for Disposition   [1] SEVERE abdominal pain (e.g., excruciating) AND [2] present > 1 hour    Additional Information   Negative: Shock suspected (e.g., cold/pale/clammy skin, too weak to stand, low BP, rapid pulse)   Negative: Difficult to awaken or acting confused (e.g., disoriented, slurred speech)   Negative: Sounds like a life-threatening emergency to the triager   Negative: Vomiting also present and worse than the diarrhea   Negative: [1] Blood in stool AND [2] without diarrhea   Negative: Diarrhea in a cancer patient who is currently (or recently) receiving chemotherapy or radiation therapy, or cancer patient who has metastatic or end-stage cancer and is receiving palliative care   Negative: Diarrhea begins while taking an antibiotic by mouth (oral antibiotic)    Answer Assessment - Initial Assessment Questions  1. DIARRHEA SEVERITY: \"How bad is the diarrhea?\" \"How many more stools have you had in the past 24 hours than normal?\"     - NO DIARRHEA (SCALE 0)    - MILD (SCALE 1-3): Few loose or mushy BMs; increase of 1-3 stools over normal daily number of stools; mild increase in ostomy output.    -  MODERATE (SCALE 4-7): Increase of 4-6 stools daily over normal; moderate increase in ostomy output.    -  SEVERE (SCALE 8-10; OR \"WORST POSSIBLE\"): Increase of 7 or more stools daily over normal; moderate increase in ostomy output; incontinence.      4-5 episodes per day, moderate-to-severe  2. ONSET: \"When did the diarrhea begin?\"       1 week  3. BM CONSISTENCY: \"How loose or watery is the diarrhea?\"       Very loose  4. VOMITING: \"Are you also vomiting?\" If Yes, ask: \"How many times in the past 24 " "hours?\"       Yes  5. ABDOMEN PAIN: \"Are you having any abdomen pain?\" If Yes, ask: \"What does it feel like?\" (e.g., crampy, dull, intermittent, constant)       Lower abdominal cramping, constant  6. ABDOMEN PAIN SEVERITY: If present, ask: \"How bad is the pain?\"  (e.g., Scale 1-10; mild, moderate, or severe)    - MILD (1-3): doesn't interfere with normal activities, abdomen soft and not tender to touch     - MODERATE (4-7): interferes with normal activities or awakens from sleep, abdomen tender to touch     - SEVERE (8-10): excruciating pain, doubled over, unable to do any normal activities        10/10 pain  7. ORAL INTAKE: If vomiting, \"Have you been able to drink liquids?\" \"How much liquids have you had in the past 24 hours?\"      Ice chips only, unable to tolerate medications  8. HYDRATION: \"Any signs of dehydration?\" (e.g., dry mouth [not just dry lips], too weak to stand, dizziness, new weight loss) \"When did you last urinate?\"      Weakness  9. EXPOSURE: \"Have you traveled to a foreign country recently?\" \"Have you been exposed to anyone with diarrhea?\" \"Could you have eaten any food that was spoiled?\"      No   10. ANTIBIOTIC USE: \"Are you taking antibiotics now or have you taken antibiotics in the past 2 months?\"        No, was on antibiotics last month  11. OTHER SYMPTOMS: \"Do you have any other symptoms?\" (e.g., fever, blood in stool)        Blood in stool, possible fever  12. PREGNANCY: \"Is there any chance you are pregnant?\" \"When was your last menstrual period?\"        NA    Protocols used: Diarrhea-ADULT-AH    " oral

## 2023-06-30 NOTE — ED PROVIDER NOTE - OBJECTIVE STATEMENT
68 year old male with PMH HTN not on meds, GERD presents to the ED with right sided abdominal pain that started at 6PM after eating salmon for dinner. Reports pain was sharp, right side of abdomen radiating to groin. 68 year old male with PMH HTN not on meds, GERD, R inguinal hernia, presents to the ED with right sided abdominal pain that started at 6PM after eating salmon for dinner. Reports pain was sharp, right side of abdomen radiating to groin. Patient denies fever, chills, chest pain, shortness of breath, nausea, vomiting, diarrhea, dysuria. Having bowel movements and passing gas. Inguinal hernia has not increased in size. Hasn't taken anything for symptoms. Recently prescribed esomeprazole for GERD.

## 2023-06-30 NOTE — ED PROVIDER NOTE - ATTENDING CONTRIBUTION TO CARE
attending Alejandro: 68yM h/o HTN not on meds, GERD, R inguinal hernia, p/w R sided abdominal pain after eating dinner. Passing gas. Last BM earlier today. Denies prior obstruction. Exam as above. Will obtain labs, CT imaging eval for complication of hernia vs appendicitis, diverticulitis, pain control, reassess

## 2023-06-30 NOTE — CONSULT NOTE ADULT - SUBJECTIVE AND OBJECTIVE BOX
GENERAL SURGERY CONSULT NOTE      HPI:  Patient is a 69 yo M w/ PMHx of HTN not on meds, GERD, known R inguinal hernia, presents to the ED with right sided abdominal pain that started at 6PM after eating salmon for dinner. Reports pain was sharp, right side of abdomen radiating to groin. Patient denies fever, chills, chest pain, shortness of breath, nausea, vomiting, diarrhea, dysuria. Having bowel movements and passing gas. Inguinal hernia has not increased in size. Has not attempted to reduce.     In the ED, patient HDS. Labs significant for NICOLETTE (Cr. 3.37). CT A/P revealing small, fat containing R inguinal hernia.       PAST MEDICAL HISTORY:  Hypertension    H/O: rheumatic fever        PAST SURGICAL HISTORY:  S/P tonsillectomy    S/P eye surgery        MEDICATIONS:  lactated ringers. 1000 milliLiter(s) IV Continuous <Continuous>  pantoprazole    Tablet 40 milliGRAM(s) Oral before breakfast  polyethylene glycol 3350 17 Gram(s) Oral daily      ALLERGIES:  No Known Allergies      VITALS & I/Os:  Vital Signs Last 24 Hrs  T(C): 36.4 (2023 13:35), Max: 37 (2023 06:30)  T(F): 97.6 (2023 13:35), Max: 98.6 (2023 06:30)  HR: 64 (2023 13:35) (57 - 83)  BP: 119/69 (2023 13:35) (110/67 - 136/80)  BP(mean): 82 (2023 09:34) (82 - 82)  RR: 18 (2023 13:35) (16 - 18)  SpO2: 100% (2023 13:35) (97% - 100%)    Parameters below as of 2023 13:35  Patient On (Oxygen Delivery Method): room air        I&O's Summary      PHYSICAL EXAM:  General: No acute distress  Respiratory: Nonlabored  Cardiovascular: RRR  Abdominal: Soft, nondistended, minimally tender to palpation in R inguinal region. No skin changes. Hernia reducible at beside. No rebound or guarding. No organomegaly, no palpable mass.  Extremities: Warm    LABS:                        11.6   8.42  )-----------( 283      ( 2023 06:38 )             35.3         137  |  96  |  37<H>  ----------------------------<  134<H>  4.7   |  26  |  3.37<H>    Ca    10.2      2023 06:38    TPro  8.2  /  Alb  4.6  /  TBili  0.3  /  DBili  x   /  AST  19  /  ALT  20  /  AlkPhos  69      Lactate:   @ 06:21  1.0    PT/INR - ( 2023 06:38 )   PT: 12.3 sec;   INR: 1.06 ratio         PTT - ( 2023 06:38 )  PTT:29.3 sec          Urinalysis Basic - ( 2023 08:47 )    Color: Light Yellow / Appearance: Clear / S.013 / pH: x  Gluc: x / Ketone: Negative  / Bili: Negative / Urobili: Negative   Blood: x / Protein: 30 mg/dL / Nitrite: Negative   Leuk Esterase: Large / RBC: 2 /hpf / WBC 39 /HPF   Sq Epi: x / Non Sq Epi: x / Bacteria: Negative        IMAGING:   GENERAL SURGERY CONSULT NOTE      HPI:  Patient is a 69 yo M w/ PMHx of HTN not on meds, GERD, known R inguinal hernia, presents to the ED with right sided abdominal pain that started at 6PM after eating salmon for dinner. Reports pain was sharp, right side of abdomen radiating to groin. Patient denies fever, chills, chest pain, shortness of breath, nausea, vomiting, diarrhea, dysuria. Having bowel movements and passing gas. Inguinal hernia has not increased in size. Has not attempted to reduce.     In the ED, patient HDS. Labs significant for NICOLETTE (Cr. 3.37), no leukocytosis, lactate normal. CT A/P revealing small, fat containing R inguinal hernia.       PAST MEDICAL HISTORY:  Hypertension    H/O: rheumatic fever        PAST SURGICAL HISTORY:  S/P tonsillectomy    S/P eye surgery        MEDICATIONS:  lactated ringers. 1000 milliLiter(s) IV Continuous <Continuous>  pantoprazole    Tablet 40 milliGRAM(s) Oral before breakfast  polyethylene glycol 3350 17 Gram(s) Oral daily      ALLERGIES:  No Known Allergies      VITALS & I/Os:  Vital Signs Last 24 Hrs  T(C): 36.4 (2023 13:35), Max: 37 (2023 06:30)  T(F): 97.6 (2023 13:35), Max: 98.6 (2023 06:30)  HR: 64 (2023 13:35) (57 - 83)  BP: 119/69 (2023 13:35) (110/67 - 136/80)  BP(mean): 82 (2023 09:34) (82 - 82)  RR: 18 (2023 13:35) (16 - 18)  SpO2: 100% (2023 13:35) (97% - 100%)    Parameters below as of 2023 13:35  Patient On (Oxygen Delivery Method): room air        I&O's Summary      PHYSICAL EXAM:  General: No acute distress  Respiratory: Nonlabored  Cardiovascular: RRR  Abdominal: Soft, nondistended, minimally tender to palpation in R inguinal region. No skin changes. Hernia reducible at beside. No rebound or guarding. No organomegaly, no palpable mass.  Extremities: Warm    LABS:                        11.6   8.42  )-----------( 283      ( 2023 06:38 )             35.3     06    137  |  96  |  37<H>  ----------------------------<  134<H>  4.7   |  26  |  3.37<H>    Ca    10.2      2023 06:38    TPro  8.2  /  Alb  4.6  /  TBili  0.3  /  DBili  x   /  AST  19  /  ALT  20  /  AlkPhos  69  30    Lactate:  -30 @ 06:21  1.0    PT/INR - ( 2023 06:38 )   PT: 12.3 sec;   INR: 1.06 ratio         PTT - ( 2023 06:38 )  PTT:29.3 sec          Urinalysis Basic - ( 2023 08:47 )    Color: Light Yellow / Appearance: Clear / S.013 / pH: x  Gluc: x / Ketone: Negative  / Bili: Negative / Urobili: Negative   Blood: x / Protein: 30 mg/dL / Nitrite: Negative   Leuk Esterase: Large / RBC: 2 /hpf / WBC 39 /HPF   Sq Epi: x / Non Sq Epi: x / Bacteria: Negative        IMAGING:

## 2023-06-30 NOTE — ED CDU PROVIDER INITIAL DAY NOTE - ATTENDING APP SHARED VISIT CONTRIBUTION OF CARE
attending Alejandro: pt with NICOLETTE. Plan for CDU for IVF hydration, repeat labs, frequent re-evaluations

## 2023-06-30 NOTE — ED ADULT NURSE NOTE - NSFALLUNIVINTERV_ED_ALL_ED
Bed/Stretcher in lowest position, wheels locked, appropriate side rails in place/Call bell, personal items and telephone in reach/Instruct patient to call for assistance before getting out of bed/chair/stretcher/Non-slip footwear applied when patient is off stretcher/[place] to call system/Physically safe environment - no spills, clutter or unnecessary equipment/Purposeful proactive rounding/Room/bathroom lighting operational, light cord in reach

## 2023-06-30 NOTE — ED PROVIDER NOTE - CLINICAL SUMMARY MEDICAL DECISION MAKING FREE TEXT BOX
Yasemin Anderson, DO PGY-2 Yasemin Anderson DO PGY-2  68 year old male with PMH HTN not on meds, GERD, R inguinal hernia, presents to the ED with right sided abdominal pain that started at 6PM after eating salmon for dinner. Reports pain was sharp, right side of abdomen radiating to groin. Patient denies fever, chills, chest pain, shortness of breath, nausea, vomiting, diarrhea, dysuria. Differential includes but not limited to intranabdominal pathology such as appendicitis, diverticulitis, will obtain labs, CT, treat pain, and re-assess for dispo.

## 2023-06-30 NOTE — ED CDU PROVIDER INITIAL DAY NOTE - NS ED ATTENDING STATEMENT MOD
This was a shared visit with the RAMIN. I reviewed and verified the documentation and independently performed the documented:

## 2023-06-30 NOTE — ED CDU PROVIDER INITIAL DAY NOTE - OBJECTIVE STATEMENT
68 year old male with PMH HTN not on meds, GERD, R inguinal hernia, presents to the ED with right sided abdominal pain that started at 6PM after eating salmon for dinner. Reports pain was sharp, right side of abdomen radiating to groin. Patient denies fever, chills, chest pain, shortness of breath, nausea, vomiting, diarrhea, dysuria. Having bowel movements and passing gas. Inguinal hernia has not increased in size. Hasn't taken anything for symptoms. Recently prescribed esomeprazole for GERD.    In the ED VSS.  Labs remarkable for creatinine 3.37.  UA with large leuks.  CT abdomen pelvis showing large stool burden and a small right-sided fat-containing inguinal hernia.  Surgery consult recommending no surgical intervention at this time.  Plan to observe in CDU overnight.  To continue IV fluid hydration and repeat labs to ensure improving NICOLETTE.

## 2023-06-30 NOTE — ED PROVIDER NOTE - PHYSICAL EXAMINATION
PHYSICAL EXAM:  CONSTITUTIONAL: Well appearing, awake, alert, oriented to person, place, time/situation and in no apparent distress.  HEAD: Atraumatic  EYES: Clear bilaterally, pupils equal, round and reactive to light.  ENMT: Airway patent, Nasal mucosa clear. Mouth with normal mucosa. Uvula is midline.   CARDIAC: Normal rate, regular rhythm. +S1/S2. No murmurs, rubs or gallops.  RESPIRATORY: Breathing unlabored. Breath sounds clear and equal bilaterally.  ABDOMEN:  RLQ tenderness to palpation. Abdomen soft, nondistended. No rebound tenderness or guarding.  GROIN: R inguinal hernia nonreducible, nontender to palpation  NEUROLOGICAL: Alert and oriented, no focal deficits, no motor or sensory deficits.   MSK: No clubbing, cyanosis, or edema. Full range of motion of all extremities.   SKIN: Skin warm and dry. No evidence of rashes or lesions.

## 2023-07-01 DIAGNOSIS — N17.9 ACUTE KIDNEY FAILURE, UNSPECIFIED: ICD-10-CM

## 2023-07-01 DIAGNOSIS — Z29.9 ENCOUNTER FOR PROPHYLACTIC MEASURES, UNSPECIFIED: ICD-10-CM

## 2023-07-01 DIAGNOSIS — D64.9 ANEMIA, UNSPECIFIED: ICD-10-CM

## 2023-07-01 DIAGNOSIS — Z87.438 PERSONAL HISTORY OF OTHER DISEASES OF MALE GENITAL ORGANS: ICD-10-CM

## 2023-07-01 DIAGNOSIS — I10 ESSENTIAL (PRIMARY) HYPERTENSION: ICD-10-CM

## 2023-07-01 DIAGNOSIS — R10.31 RIGHT LOWER QUADRANT PAIN: ICD-10-CM

## 2023-07-01 LAB
ANION GAP SERPL CALC-SCNC: 11 MMOL/L — SIGNIFICANT CHANGE UP (ref 5–17)
APPEARANCE UR: CLEAR — SIGNIFICANT CHANGE UP
BACTERIA # UR AUTO: NEGATIVE — SIGNIFICANT CHANGE UP
BILIRUB UR-MCNC: NEGATIVE — SIGNIFICANT CHANGE UP
BUN SERPL-MCNC: 37 MG/DL — HIGH (ref 7–23)
CALCIUM SERPL-MCNC: 8.5 MG/DL — SIGNIFICANT CHANGE UP (ref 8.4–10.5)
CHLORIDE SERPL-SCNC: 103 MMOL/L — SIGNIFICANT CHANGE UP (ref 96–108)
CO2 SERPL-SCNC: 27 MMOL/L — SIGNIFICANT CHANGE UP (ref 22–31)
COLOR SPEC: COLORLESS — SIGNIFICANT CHANGE UP
CREAT ?TM UR-MCNC: 23 MG/DL — SIGNIFICANT CHANGE UP
CREAT SERPL-MCNC: 3.22 MG/DL — HIGH (ref 0.5–1.3)
DIFF PNL FLD: NEGATIVE — SIGNIFICANT CHANGE UP
EGFR: 20 ML/MIN/1.73M2 — LOW
EPI CELLS # UR: 1 /HPF — SIGNIFICANT CHANGE UP
GLUCOSE SERPL-MCNC: 97 MG/DL — SIGNIFICANT CHANGE UP (ref 70–99)
GLUCOSE UR QL: NEGATIVE — SIGNIFICANT CHANGE UP
HYALINE CASTS # UR AUTO: 1 /LPF — SIGNIFICANT CHANGE UP (ref 0–2)
KETONES UR-MCNC: NEGATIVE — SIGNIFICANT CHANGE UP
LEUKOCYTE ESTERASE UR-ACNC: ABNORMAL
NITRITE UR-MCNC: NEGATIVE — SIGNIFICANT CHANGE UP
PH UR: 7 — SIGNIFICANT CHANGE UP (ref 5–8)
POTASSIUM SERPL-MCNC: 4.5 MMOL/L — SIGNIFICANT CHANGE UP (ref 3.5–5.3)
POTASSIUM SERPL-SCNC: 4.5 MMOL/L — SIGNIFICANT CHANGE UP (ref 3.5–5.3)
PROT ?TM UR-MCNC: <7 MG/DL — SIGNIFICANT CHANGE UP (ref 0–12)
PROT UR-MCNC: NEGATIVE — SIGNIFICANT CHANGE UP
PROT/CREAT UR-RTO: SIGNIFICANT CHANGE UP (ref 0–0.2)
RBC CASTS # UR COMP ASSIST: 0 /HPF — SIGNIFICANT CHANGE UP (ref 0–4)
SODIUM SERPL-SCNC: 141 MMOL/L — SIGNIFICANT CHANGE UP (ref 135–145)
SODIUM UR-SCNC: 19 MMOL/L — SIGNIFICANT CHANGE UP
SP GR SPEC: 1 — LOW (ref 1.01–1.02)
UROBILINOGEN FLD QL: NEGATIVE — SIGNIFICANT CHANGE UP
WBC UR QL: 5 /HPF — SIGNIFICANT CHANGE UP (ref 0–5)

## 2023-07-01 PROCEDURE — 99223 1ST HOSP IP/OBS HIGH 75: CPT

## 2023-07-01 PROCEDURE — 76770 US EXAM ABDO BACK WALL COMP: CPT | Mod: 26

## 2023-07-01 PROCEDURE — 99233 SBSQ HOSP IP/OBS HIGH 50: CPT

## 2023-07-01 RX ORDER — POLYETHYLENE GLYCOL 3350 17 G/17G
17 POWDER, FOR SOLUTION ORAL EVERY 12 HOURS
Refills: 0 | Status: DISCONTINUED | OUTPATIENT
Start: 2023-07-01 | End: 2023-07-04

## 2023-07-01 RX ORDER — LANOLIN ALCOHOL/MO/W.PET/CERES
3 CREAM (GRAM) TOPICAL AT BEDTIME
Refills: 0 | Status: DISCONTINUED | OUTPATIENT
Start: 2023-07-01 | End: 2023-07-04

## 2023-07-01 RX ORDER — ACETAMINOPHEN 500 MG
650 TABLET ORAL EVERY 6 HOURS
Refills: 0 | Status: DISCONTINUED | OUTPATIENT
Start: 2023-07-01 | End: 2023-07-04

## 2023-07-01 RX ORDER — ONDANSETRON 8 MG/1
4 TABLET, FILM COATED ORAL EVERY 8 HOURS
Refills: 0 | Status: DISCONTINUED | OUTPATIENT
Start: 2023-07-01 | End: 2023-07-04

## 2023-07-01 RX ORDER — HEPARIN SODIUM 5000 [USP'U]/ML
5000 INJECTION INTRAVENOUS; SUBCUTANEOUS EVERY 12 HOURS
Refills: 0 | Status: DISCONTINUED | OUTPATIENT
Start: 2023-07-01 | End: 2023-07-04

## 2023-07-01 RX ORDER — CEFTRIAXONE 500 MG/1
1000 INJECTION, POWDER, FOR SOLUTION INTRAMUSCULAR; INTRAVENOUS EVERY 24 HOURS
Refills: 0 | Status: DISCONTINUED | OUTPATIENT
Start: 2023-07-01 | End: 2023-07-02

## 2023-07-01 RX ADMIN — POLYETHYLENE GLYCOL 3350 17 GRAM(S): 17 POWDER, FOR SOLUTION ORAL at 18:14

## 2023-07-01 RX ADMIN — CEFTRIAXONE 100 MILLIGRAM(S): 500 INJECTION, POWDER, FOR SOLUTION INTRAMUSCULAR; INTRAVENOUS at 18:14

## 2023-07-01 RX ADMIN — SODIUM CHLORIDE 100 MILLILITER(S): 9 INJECTION, SOLUTION INTRAVENOUS at 12:11

## 2023-07-01 RX ADMIN — HEPARIN SODIUM 5000 UNIT(S): 5000 INJECTION INTRAVENOUS; SUBCUTANEOUS at 18:14

## 2023-07-01 RX ADMIN — POLYETHYLENE GLYCOL 3350 17 GRAM(S): 17 POWDER, FOR SOLUTION ORAL at 12:11

## 2023-07-01 NOTE — ED CDU PROVIDER SUBSEQUENT DAY NOTE - HISTORY
No interval changes since initial CDU provider note. Pt feels well without new complaint. Was able to eat dinner last night, denying pain afterwards, also stating that he was able to urinate without difficulty. NAD VSS. Creatine downtrending. Pending repeat creatine in the morning, in the setting of NICOLETTE and abdominal pain. Surgery cleared.  - JOSE L Dumont

## 2023-07-01 NOTE — H&P ADULT - NSHPPHYSICALEXAM_GEN_ALL_CORE
Vital Signs Last 24 Hrs  T(C): 36.5 (01 Jul 2023 12:13), Max: 37.2 (30 Jun 2023 20:42)  T(F): 97.7 (01 Jul 2023 12:13), Max: 98.9 (30 Jun 2023 20:42)  HR: 65 (01 Jul 2023 12:13) (57 - 74)  BP: 116/65 (01 Jul 2023 12:13) (102/60 - 127/71)  BP(mean): --  RR: 17 (01 Jul 2023 12:13) (16 - 18)  SpO2: 99% (01 Jul 2023 12:13) (97% - 100%)    Parameters below as of 01 Jul 2023 12:13  Patient On (Oxygen Delivery Method): room air      CONSTITUTIONAL: Well-groomed, in no apparent distress, resting comfortably   EYES: No conjunctival or scleral injection, non-icteric;   ENMT:  no pharyngeal injection or exudates, oral mucosa with dry membranes  NECK: Trachea midline without palpable neck mass; thyroid not enlarged and non-tender  RESPIRATORY: Breathing comfortably; lungs CTA without wheeze/rhonchi/rales  CARDIOVASCULAR: +S1S2, RRR, no M/G/R; pedal pulses full and symmetric; no lower extremity edema  GASTROINTESTINAL: No palpable masses or tenderness, +BS throughout, no rebound/guarding; right groin hernia reduciable. No cva tenderness  LYMPHATIC: No cervical LAD or tenderness;   MUSCULOSKELETAL:  no digital clubbing or cyanosis; no paraspinal tenderness; normal strength and tone of extremities  SKIN: No rashes or ulcers noted; no subcutaneous nodules or induration palpable  NEUROLOGIC: CN II-XII intact; normal reflexes in upper and lower extremities; sensation intact in LEs b/l to light touch  PSYCHIATRIC: A+O x 3; mood and affect appropriate; appropriate insight and judgment

## 2023-07-01 NOTE — ED CDU PROVIDER DISPOSITION NOTE - CLINICAL COURSE
68 year old male with PMH HTN not on meds, GERD, R inguinal hernia, presents to the ED with right sided abdominal pain that started at 6PM after eating salmon for dinner. Reports pain was sharp, right side of abdomen radiating to groin. Patient denies fever, chills, chest pain, shortness of breath, nausea, vomiting, diarrhea, dysuria. Having bowel movements and passing gas. Inguinal hernia has not increased in size. Hasn't taken anything for symptoms. Recently prescribed esomeprazole for GERD.  In the ED VSS.  Labs remarkable for creatinine 3.37.  UA with large leuks.  CT abdomen pelvis showing large stool burden and a small right-sided fat-containing inguinal hernia.  Surgery consult recommending no surgical intervention at this time.  Plan to observe in CDU overnight.  To continue IV fluid hydration and repeat labs to ensure improving NICOLETTE. 68 year old male with PMH HTN not on meds, GERD, R inguinal hernia, presents to the ED with right sided abdominal pain that started at 6PM after eating salmon for dinner. Reports pain was sharp, right side of abdomen radiating to groin. Patient denies fever, chills, chest pain, shortness of breath, nausea, vomiting, diarrhea, dysuria. Having bowel movements and passing gas. Inguinal hernia has not increased in size. Hasn't taken anything for symptoms. Recently prescribed esomeprazole for GERD.  In the ED VSS.  Labs remarkable for creatinine 3.37.  UA with large leuks.  CT abdomen pelvis showing large stool burden and a small right-sided fat-containing inguinal hernia.  Surgery consult recommending no surgical intervention at this time.  Plan to observe in CDU overnight.  To continue IV fluid hydration and repeat labs to ensure improving NICOLETTE.  In CDU, NAD, resting comfortably w/o new or evolving complaints. VSS. Cr 3.67->2.67->3.22. Pt voiding w/o complaint of straining or retention. No abd pain, ttp, or suprapubic distention. Tolerating PO. TBA for nicolette in setting of uti, pt endorsed to hospitalist. 68 year old male with PMH HTN not on meds, GERD, R inguinal hernia, presents to the ED with right sided abdominal pain that started at 6PM after eating salmon for dinner. Reports pain was sharp, right side of abdomen radiating to groin. Patient denies fever, chills, chest pain, shortness of breath, nausea, vomiting, diarrhea, dysuria. Having bowel movements and passing gas. Inguinal hernia has not increased in size. Hasn't taken anything for symptoms. Recently prescribed esomeprazole for GERD.  In the ED VSS.  Labs remarkable for creatinine 3.37.  UA with large leuks.  CT abdomen pelvis showing large stool burden and a small right-sided fat-containing inguinal hernia.  Surgery consult recommending no surgical intervention at this time.  Plan to observe in CDU overnight.  To continue IV fluid hydration and repeat labs to ensure improving NICOLETTE.  In CDU, NAD, resting comfortably w/o new or evolving complaints. VSS. Cr 3.67->2.67->3.22. Pt voiding w/o complaint. Not retaining PVR 59cc.  No abd pain, ttp, or suprapubic distention. Tolerating PO. TBA for nicolette in setting of uti, pt endorsed to hospitalist.

## 2023-07-01 NOTE — PATIENT PROFILE ADULT - FALL HARM RISK - HARM RISK INTERVENTIONS

## 2023-07-01 NOTE — H&P ADULT - PROBLEM SELECTOR PLAN 1
Initial Cr 3.37-> 2.67-> 3.22 s/p IVF. Clinically appears dry. Suspect pre-renal and may have passed kidney stone. CT ab/p (multiple nonobstructing renal stones, largest right stone 8mm and largest left stone 3mm). PVR <200 (no urinary retention). Given 1 dose ctx in the ED. UA notable for rbc, esterease, and wbc  -f/u urine culture. Will empirically treat for UTI for 3 days CTX (6/30-7/2)  -bladder scan post void. Monitor off Simmons for now  -Kidney US ordered  -send Urine protein/Cr, urine Na and urine Cr  -cw IVF 100cc/hr  -Nephrology consulted- Dr. Sampson to see tomorrow

## 2023-07-01 NOTE — ED ADULT NURSE REASSESSMENT NOTE - NS ED NURSE REASSESS COMMENT FT1
07.00 Am Received the Pt from  FELY Woodruff . Pt is Observed for NICOLETTE . Received the Pt A&OX 4 obeys commands Iris N/V/D fever chills cp SOB   Comfort care & safety measures continued  IV site looks clean & dry no signs of infiltration noted pt denies  pain IV site .  Pt is advised to call for help  call bell with in the reach pt verbalized the understanding .  pending CDU  MD philip . GCS 15/15 A&OX 4 PERRLA  size 3 Strong upper & lower extremities steady gait   No facial droop  No Hand Leg drop denies numbness tingling + Rt inguinal hernia  Continue to monitor  10.30 Am  Blader scan 59 CC
Pt received from FELY Corrales. Pt oriented to CDU & plan of care was discussed. Pt A&O x 4. Pt in CDU for IV fluids and pain control . Pt denies any abdominal pain, nausea, or vomiting. V/S stable, pt afebrile,  IV in place, patent and free of signs of infiltration. Pt resting in bed. Safety & comfort measures maintained. Call bell in reach. Will continue to monitor.

## 2023-07-01 NOTE — H&P ADULT - NSHPADDITIONALINFOADULT_GEN_ALL_CORE
Nephro consulted. Pending improvement in NICOLETTE and renal US    Radhika Durbin MD  Division of Hospital Medicine  Available on [employer] Teams

## 2023-07-01 NOTE — H&P ADULT - NSCORESITESY/N_GEN_A_CORE_RD
1146: Anesthesia staff at patient's bedside administering anesthesia and monitoring patients vital signs throughout procedure. See anesthesia note. 1209: Patient tolerated procedure without problems. Abdomen soft and patient arousable and voices no complaints Report received from CRNA, see anesthesia note. Patient transported to endoscopy recovery area. Endoscope was pre-cleaned at bedside immediately following procedure by Keren Calvillo Endo Tech. 1213: Report given to recovery nurse Alfonso Blizzard, RN.
No

## 2023-07-01 NOTE — H&P ADULT - HISTORY OF PRESENT ILLNESS
68yoM with hx of HTN, BPH s/p TURP, GERD, right inguinal hernia presents with RLQ abdominal pain. Yesterday night after finishing dinner, he had sudden burning pain in right lower quandrant and groin. It lasted from 10:30pm to this morning. It improved after receiving tylenol. He had similar instance 1 month ago in the same location and pain, self resolved. Reoccurred 2 days later but again self resolved. Endoresed difficulty urinating when his pain was ongoing last night but urinary symptoms have resolved. No dysuria or hematuria. He had a TURP 2022 for enlarged prostate and hx of urinary retention. Reports that he does not hydrate himself well enough. Hx of kidney stones. Reports nausea in which he saw a GI outpatient and was started on PPI. No hx of CKD and never needed to see a nephrologist outpatient     In the ED, received tylenol and IVF along with CTX

## 2023-07-01 NOTE — ED CDU PROVIDER DISPOSITION NOTE - NSFOLLOWUPINSTRUCTIONS_ED_ALL_ED_FT
Hydrate.     **Antibiotics     Please avoid taking NSAIDs, medication such as Motrin, Ibuprofen, Advil, Aleve.      We recommend you follow up with your primary care provider within the next 2-3 days, please bring all of your results with you.     Please return to the Emergency Department with new, worsening, or concerning symptoms, such as:  -Worsening, severe abdominal   -Uncontrollable vomiting  -Inability to urinate   -Shortness of breath or trouble breathing  -Pressure, pain, tightness in chest  -Facial drooping, arm weakness, or speech difficulty     *More detailed information regarding your visit and discharge can be found by reviewing this packet

## 2023-07-01 NOTE — H&P PST ADULT - --S/S CONSISTENT WITH [employer]LEY [employer]HE[employer] ASSOCIATED UTI
no
11.8   7.87  )-----------( 198      ( 01 Jul 2023 11:37 )             35.4   Comprehensive Metabolic Panel (07.01.23 @ 11:37)    Sodium: 141 mmol/L    Potassium: 4.2 mmol/L    Chloride: 103 mmol/L    Carbon Dioxide: 28 mmol/L    Anion Gap: 10 mmol/L    Blood Urea Nitrogen: 16 mg/dL    Creatinine: 0.83 mg/dL    Glucose: 117 mg/dL    Calcium: 9.5 mg/dL    Protein Total: 7.0 g/dL    Albumin: 4.0 g/dL    Bilirubin Total: 0.4 mg/dL    Alkaline Phosphatase: 56 U/L    Aspartate Aminotransferase (AST/SGOT): 20 U/L    Alanine Aminotransferase (ALT/SGPT): 12 U/L    eGFR: 73: The estimated glomerular filtration rate (eGFR) is calculated using the  2021 CKD-EPI creatinine equation, which does not have a coefficient for  race and is validated in individuals 18 years of age and older (N Engl J  Med 2021; 385:5291-9493). Creatinine-based eGFR may be inaccurate in  various situations including but not limited to extremes of muscle mass,  altered dietary protein intake, or medications that affect renal tubular  creatinine secretion. mL/min/1.73m2  Urinalysis Basic - ( 01 Jul 2023 11:37 )    Color: x / Appearance: x / SG: x / pH: x  Gluc: 117 mg/dL / Ketone: x  / Bili: x / Urobili: x   Blood: x / Protein: x / Nitrite: x   Leuk Esterase: x / RBC: x / WBC x   Sq Epi: x / Non Sq Epi: x / Bacteria: x    Troponin T, High Sensitivity (07.01.23 @ 11:07)    Troponin T, High Sensitivity Result: 21: *  *  Rapid upward or downward changes in high-sensitivity troponin levels  suggest acute myocardial injury. Renal impairment may cause sustained  troponin elevations.  Normal: <6 - 14 ng/L  Indeterminate: 15-51 ng/L  Elevated: > 51 ng/L  See http://labs/test/TROPTHS on the [facility] intranet for more  information ng/L

## 2023-07-01 NOTE — H&P ADULT - NSHPREVIEWOFSYSTEMS_GEN_ALL_CORE
Review of Systems:  Constitutional: No fever, No weight loss, good appetite/po intake  Head: No headache or dizziness   Eyes: No blurry vision, No diplopia  Neuro: No tremors, No muscle weakness   Cardiovascular: No chest pain, No palpitations  Respiratory: No SOB, No cough  GI: No nausea, No vomiting, No diarrhea  : No dysuria, No hematuria  Skin: No rash or lesions  MSK: No joint pain or swelling  Psych: No depression or mood changes

## 2023-07-01 NOTE — ED CDU PROVIDER DISPOSITION NOTE - CADM POA CENTRAL LINE
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 
No
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

## 2023-07-01 NOTE — H&P ADULT - NSHPLABSRESULTS_GEN_ALL_CORE
Personally reviewed labs, imaging, ekg                           11.6   8.42  )-----------( 283      ( 30 Jun 2023 06:38 )             35.3       07-01    141  |  103  |  37<H>  ----------------------------<  97  4.5   |  27  |  3.22<H>    Ca    8.5      01 Jul 2023 06:00    TPro  8.2  /  Alb  4.6  /  TBili  0.3  /  DBili  x   /  AST  19  /  ALT  20  /  AlkPhos  69  06-30              Urinalysis Basic - ( 01 Jul 2023 06:00 )    Color: x / Appearance: x / SG: x / pH: x  Gluc: 97 mg/dL / Ketone: x  / Bili: x / Urobili: x   Blood: x / Protein: x / Nitrite: x   Leuk Esterase: x / RBC: x / WBC x   Sq Epi: x / Non Sq Epi: x / Bacteria: x        PT/INR - ( 30 Jun 2023 06:38 )   PT: 12.3 sec;   INR: 1.06 ratio         PTT - ( 30 Jun 2023 06:38 )  PTT:29.3 sec    Lactate Trend            CAPILLARY BLOOD GLUCOSE    < from: CT Abdomen and Pelvis No Cont (06.30.23 @ 08:26) >    IMPRESSION:  Large colonic stool burden with mildly dilated and fecalized distal   ileum, which may bedue to stool refluxing through the ileocecal valve.    Small right inguinal hernia containing fat. There is also trace fluid   within the hernia sac, nonspecific but can be seen with fat   strangulation. Correlate with physical exam to assess for incarceration.    Bilateral nonobstructing renal calculi. No hydronephrosis.      < end of copied text >

## 2023-07-01 NOTE — ED CDU PROVIDER DISPOSITION NOTE - ATTENDING APP SHARED VISIT CONTRIBUTION OF CARE
RGUJRAL 67yo male takes daily protonix presented with R side abd pain and noted to have NICOLETTE. Results reviewed, surgery consult appreciated with no acute intervention. Pt noted to have UTI treated with antibiotics. Pt states he has been urinating without difficulty, will obtain another post void residual. Repeat Cr still remains elevated, states he has been having symptoms of fatigue for a month, will admit to hospital for stabilization and monitoring of NICOLETTE.   Plan discussed with patient.

## 2023-07-01 NOTE — H&P ADULT - PROBLEM SELECTOR PLAN 2
Pain resolved. Suspect passage of kidney stone +/- constipation. CT ab/P demonstrated multiple nonobstructing renal stones, largest right stone 8mm and largest left stone 3mm). Right inguinal hernia reducible  -monitor  -encourage PO hydration  -Miralax BID (last BM 7/1 AM)  -surgery consulted- outpatient f/u for inguinal hernia

## 2023-07-01 NOTE — H&P ADULT - ASSESSMENT
68yoM with hx of HTN, BPH s/p TURP, GERD, right inguinal hernia presents with RLQ abdominal pain likely for kidney stones admitted for NICOLETTE

## 2023-07-01 NOTE — ED CDU PROVIDER SUBSEQUENT DAY NOTE - ATTENDING APP SHARED VISIT CONTRIBUTION OF CARE
67yo male takes daily protonix presented with R side abd pain and noted to have NICOLETTE. Results reviewed, surgery consult appreciated with no acute intervention. Pt noted to have UTI treated with antibiotics. Pt states he has been urinating without difficulty, will obtain another post void residual. Repeat Cr still remains elevated, states he has been having symptoms of fatigue for a month, will admit to hospital for stabilization and monitoring of NICOLETTE.   Plan discussed with patient.

## 2023-07-01 NOTE — ED CDU PROVIDER SUBSEQUENT DAY NOTE - NS ED ROS FT
Constitutional: No fever or chills  Eyes: No visual changes, no eye pain   CV: No chest pain or lower extremity edema  Resp: No SOB no cough  GI: + abd pain. No nausea or vomiting. No diarrhea.   : No dysuria, hematuria.   MSK: No musculoskeletal pain  Skin: No rash  Psych: No complaints   Neuro: No headache. No numbness or tingling. No weakness.  Endo: No known diabetes

## 2023-07-01 NOTE — ED CDU PROVIDER SUBSEQUENT DAY NOTE - PROGRESS NOTE DETAILS
Pt seen at bedside in NAD, resting comfortably w/o new or evolving complaints. VSS. Cr 3.67->2.67->3.22. Pt voiding w/o complaint of straining or retention. No abd pain, ttp, or suprapubic distention. Tolerating PO. TBA for rosy in setting of uti, pt endorsed to hospitalist. Pt seen at bedside in NAD, resting comfortably w/o new or evolving complaints. VSS. Cr 3.67->2.67->3.22. Pt voiding w/o complaint. Not retaining PVR 59cc. No abd pain, ttp, or suprapubic distention. Tolerating PO. TBA for rosy in setting of uti, pt endorsed to hospitalist.

## 2023-07-02 LAB
ANION GAP SERPL CALC-SCNC: 9 MMOL/L — SIGNIFICANT CHANGE UP (ref 5–17)
BUN SERPL-MCNC: 34 MG/DL — HIGH (ref 7–23)
CALCIUM SERPL-MCNC: 8.7 MG/DL — SIGNIFICANT CHANGE UP (ref 8.4–10.5)
CHLORIDE SERPL-SCNC: 105 MMOL/L — SIGNIFICANT CHANGE UP (ref 96–108)
CO2 SERPL-SCNC: 27 MMOL/L — SIGNIFICANT CHANGE UP (ref 22–31)
CREAT SERPL-MCNC: 2.59 MG/DL — HIGH (ref 0.5–1.3)
EGFR: 26 ML/MIN/1.73M2 — LOW
GLUCOSE SERPL-MCNC: 81 MG/DL — SIGNIFICANT CHANGE UP (ref 70–99)
HCT VFR BLD CALC: 29.1 % — LOW (ref 39–50)
HGB BLD-MCNC: 9.7 G/DL — LOW (ref 13–17)
IRON SATN MFR SERPL: 31 % — SIGNIFICANT CHANGE UP (ref 16–55)
IRON SATN MFR SERPL: 51 UG/DL — SIGNIFICANT CHANGE UP (ref 45–165)
MCHC RBC-ENTMCNC: 29.7 PG — SIGNIFICANT CHANGE UP (ref 27–34)
MCHC RBC-ENTMCNC: 33.3 GM/DL — SIGNIFICANT CHANGE UP (ref 32–36)
MCV RBC AUTO: 89 FL — SIGNIFICANT CHANGE UP (ref 80–100)
NRBC # BLD: 0 /100 WBCS — SIGNIFICANT CHANGE UP (ref 0–0)
PLATELET # BLD AUTO: 223 K/UL — SIGNIFICANT CHANGE UP (ref 150–400)
POTASSIUM SERPL-MCNC: 3.9 MMOL/L — SIGNIFICANT CHANGE UP (ref 3.5–5.3)
POTASSIUM SERPL-SCNC: 3.9 MMOL/L — SIGNIFICANT CHANGE UP (ref 3.5–5.3)
RBC # BLD: 3.27 M/UL — LOW (ref 4.2–5.8)
RBC # FLD: 13.5 % — SIGNIFICANT CHANGE UP (ref 10.3–14.5)
SODIUM SERPL-SCNC: 141 MMOL/L — SIGNIFICANT CHANGE UP (ref 135–145)
TIBC SERPL-MCNC: 166 UG/DL — LOW (ref 220–430)
UIBC SERPL-MCNC: 115 UG/DL — SIGNIFICANT CHANGE UP (ref 110–370)
WBC # BLD: 5.32 K/UL — SIGNIFICANT CHANGE UP (ref 3.8–10.5)
WBC # FLD AUTO: 5.32 K/UL — SIGNIFICANT CHANGE UP (ref 3.8–10.5)

## 2023-07-02 PROCEDURE — 99233 SBSQ HOSP IP/OBS HIGH 50: CPT

## 2023-07-02 RX ORDER — SODIUM CHLORIDE 9 MG/ML
1000 INJECTION INTRAMUSCULAR; INTRAVENOUS; SUBCUTANEOUS
Refills: 0 | Status: DISCONTINUED | OUTPATIENT
Start: 2023-07-02 | End: 2023-07-04

## 2023-07-02 RX ORDER — VANCOMYCIN HCL 1 G
500 VIAL (EA) INTRAVENOUS ONCE
Refills: 0 | Status: COMPLETED | OUTPATIENT
Start: 2023-07-02 | End: 2023-07-02

## 2023-07-02 RX ORDER — SENNA PLUS 8.6 MG/1
1 TABLET ORAL DAILY
Refills: 0 | Status: DISCONTINUED | OUTPATIENT
Start: 2023-07-02 | End: 2023-07-04

## 2023-07-02 RX ORDER — TAMSULOSIN HYDROCHLORIDE 0.4 MG/1
0.4 CAPSULE ORAL AT BEDTIME
Refills: 0 | Status: DISCONTINUED | OUTPATIENT
Start: 2023-07-02 | End: 2023-07-04

## 2023-07-02 RX ADMIN — TAMSULOSIN HYDROCHLORIDE 0.4 MILLIGRAM(S): 0.4 CAPSULE ORAL at 22:01

## 2023-07-02 RX ADMIN — SODIUM CHLORIDE 75 MILLILITER(S): 9 INJECTION INTRAMUSCULAR; INTRAVENOUS; SUBCUTANEOUS at 16:19

## 2023-07-02 RX ADMIN — PANTOPRAZOLE SODIUM 40 MILLIGRAM(S): 20 TABLET, DELAYED RELEASE ORAL at 05:37

## 2023-07-02 RX ADMIN — HEPARIN SODIUM 5000 UNIT(S): 5000 INJECTION INTRAVENOUS; SUBCUTANEOUS at 05:37

## 2023-07-02 RX ADMIN — POLYETHYLENE GLYCOL 3350 17 GRAM(S): 17 POWDER, FOR SOLUTION ORAL at 17:36

## 2023-07-02 RX ADMIN — POLYETHYLENE GLYCOL 3350 17 GRAM(S): 17 POWDER, FOR SOLUTION ORAL at 05:37

## 2023-07-02 RX ADMIN — HEPARIN SODIUM 5000 UNIT(S): 5000 INJECTION INTRAVENOUS; SUBCUTANEOUS at 17:36

## 2023-07-02 RX ADMIN — Medication 100 MILLIGRAM(S): at 11:46

## 2023-07-02 RX ADMIN — SENNA PLUS 1 TABLET(S): 8.6 TABLET ORAL at 06:58

## 2023-07-02 NOTE — PROGRESS NOTE ADULT - SUBJECTIVE AND OBJECTIVE BOX
Patient is a 68y old  Male who presents with a chief complaint of rosy (2023 03:40)      SUBJECTIVE / OVERNIGHT EVENTS: Patient seen and examined at bedside. No acute events overnight. Abdominal pain resolved. Right inguinal hernia not bulging. Denies nausea/vomiting and tolerating PO.    MEDICATIONS  (STANDING):  cefTRIAXone   IVPB 1000 milliGRAM(s) IV Intermittent every 24 hours  heparin   Injectable 5000 Unit(s) SubCutaneous every 12 hours  lactated ringers. 1000 milliLiter(s) (100 mL/Hr) IV Continuous <Continuous>  pantoprazole    Tablet 40 milliGRAM(s) Oral before breakfast  polyethylene glycol 3350 17 Gram(s) Oral every 12 hours  senna 1 Tablet(s) Oral daily  tamsulosin 0.4 milliGRAM(s) Oral at bedtime    MEDICATIONS  (PRN):  acetaminophen     Tablet .. 650 milliGRAM(s) Oral every 6 hours PRN Temp greater or equal to 38C (100.4F), Mild Pain (1 - 3)  melatonin 3 milliGRAM(s) Oral at bedtime PRN Insomnia  ondansetron Injectable 4 milliGRAM(s) IV Push every 8 hours PRN Nausea and/or Vomiting      CAPILLARY BLOOD GLUCOSE        I&O's Summary    2023 07:01  -  2023 07:00  --------------------------------------------------------  IN: 1400 mL / OUT: 1975 mL / NET: -575 mL        PHYSICAL EXAM:  Vital Signs Last 24 Hrs  T(C): 36.4 (2023 05:23), Max: 36.9 (2023 16:02)  T(F): 97.5 (2023 05:23), Max: 98.5 (2023 16:02)  HR: 55 (2023 05:23) (55 - 68)  BP: 120/64 (2023 05:23) (108/63 - 127/71)  BP(mean): --  RR: 18 (2023 05:23) (17 - 18)  SpO2: 98% (2023 05:23) (98% - 100%)    Parameters below as of 2023 05:23  Patient On (Oxygen Delivery Method): room air        GEN: male in NAD, appears comfortable, no diaphoresis  EYES: No scleral injection, PERRL, EOMI  ENTM: neck supple & symmetric without tracheal deviation, moist membranes, no gross hearing impairment, thyroid gland not enlarged  CV: +S1/S2, no m/r/g, no abdominal bruit, no LE edema  RESP: breathing comfortably, no respiratory accessory muscle use, CTAB, no w/r/r  GI: normoactive BS, soft, NTND, no rebounding/guarding, no palpable masses    LABS:        141  |  103  |  37<H>  ----------------------------<  97  4.5   |  27  |  3.22<H>    Ca    8.5      2023 06:00            Urinalysis Basic - ( 2023 16:14 )    Color: Colorless / Appearance: Clear / S.005 / pH: x  Gluc: x / Ketone: Negative  / Bili: Negative / Urobili: Negative   Blood: x / Protein: Negative / Nitrite: Negative   Leuk Esterase: Moderate / RBC: 0 /hpf / WBC 5 /HPF   Sq Epi: x / Non Sq Epi: x / Bacteria: Negative          RADIOLOGY & ADDITIONAL TESTS:  Results Reviewed:   Imaging Personally Reviewed:  Electrocardiogram Personally Reviewed:    COORDINATION OF CARE:  Care Discussed with Consultants/Other Providers [Y/N]:  Prior or Outpatient Records Reviewed [Y/N]:

## 2023-07-02 NOTE — CONSULT NOTE ADULT - SUBJECTIVE AND OBJECTIVE BOX
[facility] DIVISION OF INFECTIOUS DISEASES  FRED Trinidad S. Shah, Y. Patel, [facility]  [phone]  ([phone] - weekdays after 5pm and weekends)    NANI SHI  68y, Male  [id number]    HPI:  Patient is a 68 year old male with PMH of HTN, BPH s/p TURP, GERD, right inguinal hernia presents with RLQ abdominal pain. Yesterday night after finishing dinner, he had sudden burning pain in right lower quandrant and groin. It lasted from 10:30pm to this morning. It improved after receiving tylenol. He had similar instance 1 month ago in the same location and pain, self resolved. Reoccurred 2 days later but again self resolved. Endoresed difficulty urinating when his pain was ongoing last night but urinary symptoms have resolved. No dysuria or hematuria. He had a TURP 2022 for enlarged prostate and hx of urinary retention. Reports that he does not hydrate himself well enough. Hx of kidney stones. Reports nausea in which he saw a GI outpatient and was started on PPI. No hx of CKD and never needed to see a nephrologist outpatient. In the ED, received tylenol and IVF along with CTX (01 Jul 2023 14:51)  Patient seen and examined at bedside this morning. Patient confirms above history. He reports he is feeling better, feels he is emptying his bladder better and not retaining much; denies dysuria now and feels RLQ pain is better. He does feel some pain when his hernia bulges. Denies fevers, chest pain, cough, dyspnea, nausea, vomiting, diarrhea.   ROS: 14 point review of systems completed, pertinent positives and negatives as per HPI.    Allergies: No Known Allergies    PMH -- Hypertension  H/O: rheumatic fever    PSH -- S/P tonsillectomy  S/P eye surgery    FH -- FH: bladder cancer (Father)  FH: CAD (coronary artery disease) (Mother)    Social History -- denies tobacco, alcohol or illicit drug use    Physical Exam--  Vital Signs Last 24 Hrs  T(F): 97.7 (02 Jul 2023 09:39), Max: 98.5 (01 Jul 2023 16:02)  HR: 55 (02 Jul 2023 09:39) (55 - 67)  BP: 118/67 (02 Jul 2023 09:39) (108/63 - 126/72)  RR: 18 (02 Jul 2023 09:39) (17 - 18)  SpO2: 98% (02 Jul 2023 09:39) (98% - 100%)  General: no acute distress  HEENT: NC/AT, EOMI, anicteric, neck supple  Lungs: Clear bilaterally without rales, wheezing or rhonchi  Heart: S1, S2 present, RRR. No murmur, rub or gallop.  Abdomen: Soft. Nondistended. Nontender. BS present.   Neuro: AAOx3, no obvious focal deficits   Extremities: No cyanosis or clubbing. No edema.   Skin: Warm. Dry. Good turgor. No visible rash.   Psychiatric: Appropriate affect and mood for situation.   Lines: PIV    Laboratory & Imaging Data--  CBC:                       9.7    5.32  )-----------( 223      ( 02 Jul 2023 07:07 )             29.1     WBC Count: 5.32 K/uL (07-02-23 @ 07:07)  WBC Count: 8.42 K/uL (06-30-23 @ 06:38)    CMP: 07-02    141  |  105  |  34<H>  ----------------------------<  81  3.9   |  27  |  2.59<H>    Ca    8.7      02 Jul 2023 07:06    Urinalysis + Microscopic Examination (07.01.23 @ 16:14)    pH Urine: 7.0   Urine Appearance: Clear   Color: Colorless   Specific Gravity: 1.005   Protein, Urine: Negative   Glucose Qualitative, Urine: Negative   Ketone - Urine: Negative   Blood, Urine: Negative   Bilirubin: Negative   Urobilinogen: Negative   Leukocyte Esterase Concentration: Moderate   Nitrite: Negative   White Blood Cell - Urine: 5 /HPF   Red Blood Cell - Urine: 0 /hpf   Bacteria: Negative   Hyaline Casts: 1 /lpf   Squamous Epithelial Cells: 1 /hpf    Microbiology: reviewed  Radiology--reviewed  < from: US Kidney and Bladder (07.01.23 @ 23:26) >    IMPRESSION:    Multiple bilateral nonobstructive renal stones. No hydronephrosis.    Mild bladder wall thickening. Correlate with urinalysis and lab values to   assess for cystitis and/or ascending urinary tract infection.    < end of copied text >  < from: CT Abdomen and Pelvis No Cont (06.30.23 @ 08:26) >  IMPRESSION:  Large colonic stool burden with mildly dilated and fecalized distal   ileum, which may bedue to stool refluxing through the ileocecal valve.    Small right inguinal hernia containing fat. There is also trace fluid   within the hernia sac, nonspecific but can be seen with fat   strangulation. Correlate with physical exam to assess for incarceration.    Bilateral nonobstructing renal calculi. No hydronephrosis.    < end of copied text >    Active Medications--  acetaminophen     Tablet .. 650 milliGRAM(s) Oral every 6 hours PRN  cefTRIAXone   IVPB 1000 milliGRAM(s) IV Intermittent every 24 hours  heparin   Injectable 5000 Unit(s) SubCutaneous every 12 hours  lactated ringers. 1000 milliLiter(s) IV Continuous <Continuous>  melatonin 3 milliGRAM(s) Oral at bedtime PRN  ondansetron Injectable 4 milliGRAM(s) IV Push every 8 hours PRN  pantoprazole    Tablet 40 milliGRAM(s) Oral before breakfast  polyethylene glycol 3350 17 Gram(s) Oral every 12 hours  senna 1 Tablet(s) Oral daily  tamsulosin 0.4 milliGRAM(s) Oral at bedtime    Current Antimicrobials:   cefTRIAXone   IVPB 1000 milliGRAM(s) IV Intermittent every 24 hours    Prior/Completed Antimicrobials:  cefTRIAXone   IVPB    Immunologic:

## 2023-07-02 NOTE — CONSULT NOTE ADULT - ASSESSMENT
Patient is a 67yo Male with HTN (diet controlled), BPH s/p TURP, GERD, right inguinal hernia p/w RLQ abdominal pain. Pt a/w UTI, NICOLETTE and nephrolithiasis. Nephrology consulted for Elevated serum creatinine.    1. NICOLETTE- previous SCr 1.14 on 4/21/23. NICOLETTE likely hemodynamically mediated. Renal function improving with IVF. +UA in the setting of infection. FeNa 1.89%; inconclusive and likely taken on IVF. CT with no hydro, multiple non obstructing  renal stones  and large stool burden. c/w good bowel regimen. Recc NS @ 75ml/hr. Encourage increased fluid intake >2L/day. Strict I/Os. Avoid nephrotoxins/ NSAIDs/ RCA. Monitor BMP.    2. Acute cystitis- +UA, UCx pending. c/w Ceftriaxone.     3. Nephrolithiasis- multiple non obstructing stones. Renal US with large Rt sided stone 1.2 cm and left largest stone 0.8 mm. Difficult to pass and pt will need outpt Urology f/u.   As per pt, he has an appt with Urologist Dr. Saw Mccormack on 7/6.     4. BPH- s/p TURP. c/w [facility] NEPHROLOGY  Raoy Gan M.D.  Mitch Garcia D.O.  Lili Sampson M.D.  Rossy Cruz, MSN, ANP-C  [phone]  Fax: [phone]    [facility] [place] [address], [address]  [place] [address]

## 2023-07-02 NOTE — CONSULT NOTE ADULT - SUBJECTIVE AND OBJECTIVE BOX
[facility] NEPHROLOGY- CONSULTATION NOTE    Patient is a 69yo Male with HTN (diet controlled), BPH s/p TURP, GERD, right inguinal hernia p/w RLQ abdominal pain. Pt a/w UTI, NICOLETTE and nephrolithiasis. Nephrology consulted for Elevated serum creatinine.      Pt aware of NICOLETTE last year which resolved s/p TURP (4/2022) and as per recently labs renal function wnl. Patient denies any NSAID use, recent CT with contrast, hepatitis or blood transfusions. Pt denies any dysuria or hematuria  Pt had 10/10 RLQ abd pain on 6/29 which has improved to 1/10 pain; very minimal. Pt had a BM 2 days ago.   Pt denies any fevers, chills, SOB, chest pain, n/v/d, or LE edema      PAST MEDICAL & SURGICAL HISTORY:  Hypertension  no longer on medication      H/O: rheumatic fever      S/P tonsillectomy      S/P eye surgery        No Known Allergies    Home Medications Reviewed  Hospital Medications:   MEDICATIONS  (STANDING):  cefTRIAXone   IVPB 1000 milliGRAM(s) IV Intermittent every 24 hours  heparin   Injectable 5000 Unit(s) SubCutaneous every 12 hours  lactated ringers. 1000 milliLiter(s) (100 mL/Hr) IV Continuous <Continuous>  pantoprazole    Tablet 40 milliGRAM(s) Oral before breakfast  polyethylene glycol 3350 17 Gram(s) Oral every 12 hours  senna 1 Tablet(s) Oral daily  tamsulosin 0.4 milliGRAM(s) Oral at bedtime    SOCIAL HISTORY:  Denies ETOh, Smoking, or drug use  FAMILY HISTORY:  FH: bladder cancer (Father)    FH: CAD (coronary artery disease) (Mother)        REVIEW OF SYSTEMS:  Gen: no changes in weight  HEENT: no rhinorrhea  Neck: no sore throat  Cards: no chest pain  Resp: no dyspnea  GI: no nausea or vomiting or diarrhea  : no dysuria or hematuria, minimal Left flank pain 1/10  Vascular: no LE edema  Derm: no rashes  Neuro: no numbness/tingling  All other review of systems is negative unless indicated above.    VITALS:  T(F): 97.7 (07-02-23 @ 09:39), Max: 98.5 (07-01-23 @ 16:02)  HR: 55 (07-02-23 @ 09:39)  BP: 118/67 (07-02-23 @ 09:39)  RR: 18 (07-02-23 @ 09:39)  SpO2: 98% (07-02-23 @ 09:39)  Wt(kg): --    07-01 @ 07:01  -  07-02 @ 07:00  --------------------------------------------------------  IN: 1400 mL / OUT: 1975 mL / NET: -575 mL    07-02 @ 07:01  -  07-02 @ 09:58  --------------------------------------------------------  IN: 360 mL / OUT: 850 mL / NET: -490 mL        PHYSICAL EXAM:  Gen: NAD, calm  HEENT: MMM  Neck: no JVD  Cards: RRR, +S1/S2, no M/G/R  Resp: CTA B/L  GI: soft, NT/ND, NABS  : no CVA tenderness b/l  Extremities: no LE edema B/L  Derm: no rashes  Neuro: non-focal    LABS:  07-02    141  |  105  |  34<H>  ----------------------------<  81  3.9   |  27  |  2.59<H>    Ca    8.7      02 Jul 2023 07:06      Creatinine Trend: 2.59 <--, 3.22 <--, 2.67 <--, 3.37 <--                        9.7    5.32  )-----------( 223      ( 02 Jul 2023 07:07 )             29.1     Urine Studies:  Urinalysis Basic - ( 02 Jul 2023 07:06 )    Color:  / Appearance:  / SG:  / pH:   Gluc: 81 mg/dL / Ketone:   / Bili:  / Urobili:    Blood:  / Protein:  / Nitrite:    Leuk Esterase:  / RBC:  / WBC    Sq Epi:  / Non Sq Epi:  / Bacteria:       Creatinine, Random Urine: 23 mg/dL (07-01 @ 16:14)  Protein/Creatinine Ratio Calculation: Unable to calculate (07-01 @ 16:14)  Sodium, Random Urine: 19 mmol/L (07-01 @ 16:14)    RADIOLOGY & ADDITIONAL STUDIES:    < from: US Kidney and Bladder (07.01.23 @ 23:26) >    ACC: [id number] EXAM:  US KIDNEYS AND BLADDER   ORDERED BY: NNEKA ALFORD     PROCEDURE DATE:  07/01/2023          < end of copied text >  < from: US Kidney and Bladder (07.01.23 @ 23:26) >    FINDINGS:  Right kidney: 10 cm. No hydronephrosis. Multiple nonobstructive renal   calculi with the largest in the right lower pole measuring 1.2 x 0.8 x   1.1 cm. Right mid to lower pole cyst with internal septation measuring   2.1 x 1.4 x 2 cm.    Left kidney: 10.6 cm. No hydronephrosis. Multiple nonobstructive renal   stones with the largest in the lower pole measuring 0.8 x 0.5 x 0.9 mm.    Urinary bladder: Adequately distended. Mild wall thickening.    IMPRESSION:    Multiple bilateral nonobstructive renal stones. No hydronephrosis.    Mild bladder wall thickening. Correlate with urinalysis and lab values to   assess for cystitis and/or ascending urinary tract infection.    --- End ofReport ---        < end of copied text >  < from: CT Abdomen and Pelvis No Cont (06.30.23 @ 08:26) >    ACC: [id number] EXAM:  CT ABDOMEN AND PELVIS   ORDERED BY: REGINA WOO     PROCEDURE DATE:  06/30/2023        < end of copied text >  < from: CT Abdomen and Pelvis No Cont (06.30.23 @ 08:26) >  KIDNEYS/URETERS: Numerous bilateral nonobstructing renal calculi, largest   on the right measuring 8 mm and the largest on the left measuring 3 mm.   No hydronephrosis. Right renal cyst.      < end of copied text >  < from: CT Abdomen and Pelvis No Cont (06.30.23 @ 08:26) >    IMPRESSION:  Large colonic stool burden with mildly dilated and fecalized distal   ileum, which may bedue to stool refluxing through the ileocecal valve.    Small right inguinal hernia containing fat. There is also trace fluid   within the hernia sac, nonspecific but can be seen with fat   strangulation. Correlate with physical exam to assess for incarceration.    Bilateral nonobstructing renal calculi. No hydronephrosis.    < end of copied text >

## 2023-07-02 NOTE — CONSULT NOTE ADULT - ASSESSMENT
Patient is a 68 year old male with PMH of HTN, BPH s/p TURP, GERD, right inguinal hernia presents with RLQ abdominal pain and admitted for NICOLETTE.   RLQ pain likely due to stones-may have passed, possibly due to constipation  Acute cystitis with possible ascending UTI  NICOLETTE    initial UA on 6/30 with some pyuria 39 WBC and large LE  repeat UA on 7/1 with no significant pyuria 5 WBC and mod LE  Renal/bladder U/S with multiple b/l nonobstructive renal stones, no hydronephrosis, mild bladder wall thickening   CTAP with large stool burden, small R inguinal hernia, b/l nonobstructing renal stones and no hydronephrosis   Started on empiric ceftriaxone   Afebrile, no leukocytosis     Recommendations:  Follow up urine culture (6/30) - in process   Continue on ceftriaxone 1g IV Q24h  Nephrology following for NICOLETTE  Monitor temps/WBC, Cr       Ruperto Martinez M.D.  [facility], Division of Infectious Diseases  [phone]  After 5pm on weekdays and all day on weekends - please call [phone]     Patient is a 68 year old male with PMH of HTN, BPH s/p TURP, GERD, right inguinal hernia presents with RLQ abdominal pain and admitted for NICOLETTE.   RLQ pain likely due to stones-may have passed, possibly due to constipation  Acute cystitis with possible ascending UTI  NICOLETTE    initial UA on 6/30 with some pyuria 39 WBC and large LE  repeat UA on 7/1 with no significant pyuria 5 WBC and mod LE  Renal/bladder U/S with multiple b/l nonobstructive renal stones, no hydronephrosis, mild bladder wall thickening   CTAP with large stool burden, small R inguinal hernia, b/l nonobstructing renal stones and no hydronephrosis   Started on empiric ceftriaxone   Afebrile, no leukocytosis     Recommendations:  Follow up urine culture (6/30) for Enterococcus sp - ID/sensitivities   Discontinue ceftriaxone  Start on vancomycin 500mg IV once today, check level in am prior to re-dosing  -- will tailor antibiotics pending culture results   Nephrology following for NICOLETTE  Monitor temps/WBC, Cr       Ruperto Martinez M.D.  [facility], Division of Infectious Diseases  [phone]  After 5pm on weekdays and all day on weekends - please call [phone]

## 2023-07-03 DIAGNOSIS — N39.0 URINARY TRACT INFECTION, SITE NOT SPECIFIED: ICD-10-CM

## 2023-07-03 PROBLEM — N40.1 BPH WITH URINARY OBSTRUCTION: Status: ACTIVE | Noted: 2018-10-15

## 2023-07-03 LAB
ANION GAP SERPL CALC-SCNC: 10 MMOL/L — SIGNIFICANT CHANGE UP (ref 5–17)
BUN SERPL-MCNC: 31 MG/DL — HIGH (ref 7–23)
CALCIUM SERPL-MCNC: 8.6 MG/DL — SIGNIFICANT CHANGE UP (ref 8.4–10.5)
CHLORIDE SERPL-SCNC: 106 MMOL/L — SIGNIFICANT CHANGE UP (ref 96–108)
CO2 SERPL-SCNC: 27 MMOL/L — SIGNIFICANT CHANGE UP (ref 22–31)
CREAT SERPL-MCNC: 2.51 MG/DL — HIGH (ref 0.5–1.3)
EGFR: 27 ML/MIN/1.73M2 — LOW
GLUCOSE SERPL-MCNC: 79 MG/DL — SIGNIFICANT CHANGE UP (ref 70–99)
HCT VFR BLD CALC: 28.2 % — LOW (ref 39–50)
HGB BLD-MCNC: 9.5 G/DL — LOW (ref 13–17)
MCHC RBC-ENTMCNC: 30.1 PG — SIGNIFICANT CHANGE UP (ref 27–34)
MCHC RBC-ENTMCNC: 33.7 GM/DL — SIGNIFICANT CHANGE UP (ref 32–36)
MCV RBC AUTO: 89.2 FL — SIGNIFICANT CHANGE UP (ref 80–100)
NRBC # BLD: 0 /100 WBCS — SIGNIFICANT CHANGE UP (ref 0–0)
PLATELET # BLD AUTO: 216 K/UL — SIGNIFICANT CHANGE UP (ref 150–400)
POTASSIUM SERPL-MCNC: 4.7 MMOL/L — SIGNIFICANT CHANGE UP (ref 3.5–5.3)
POTASSIUM SERPL-SCNC: 4.7 MMOL/L — SIGNIFICANT CHANGE UP (ref 3.5–5.3)
RBC # BLD: 3.16 M/UL — LOW (ref 4.2–5.8)
RBC # FLD: 13.5 % — SIGNIFICANT CHANGE UP (ref 10.3–14.5)
SODIUM SERPL-SCNC: 143 MMOL/L — SIGNIFICANT CHANGE UP (ref 135–145)
VANCOMYCIN FLD-MCNC: 4.5 UG/ML — SIGNIFICANT CHANGE UP
WBC # BLD: 5.49 K/UL — SIGNIFICANT CHANGE UP (ref 3.8–10.5)
WBC # FLD AUTO: 5.49 K/UL — SIGNIFICANT CHANGE UP (ref 3.8–10.5)

## 2023-07-03 PROCEDURE — 99232 SBSQ HOSP IP/OBS MODERATE 35: CPT

## 2023-07-03 RX ORDER — VANCOMYCIN HCL 1 G
750 VIAL (EA) INTRAVENOUS ONCE
Refills: 0 | Status: COMPLETED | OUTPATIENT
Start: 2023-07-03 | End: 2023-07-03

## 2023-07-03 RX ORDER — LACTULOSE 10 G/15ML
10 SOLUTION ORAL ONCE
Refills: 0 | Status: COMPLETED | OUTPATIENT
Start: 2023-07-03 | End: 2023-07-03

## 2023-07-03 RX ADMIN — HEPARIN SODIUM 5000 UNIT(S): 5000 INJECTION INTRAVENOUS; SUBCUTANEOUS at 05:21

## 2023-07-03 RX ADMIN — Medication 10 MILLIGRAM(S): at 15:35

## 2023-07-03 RX ADMIN — POLYETHYLENE GLYCOL 3350 17 GRAM(S): 17 POWDER, FOR SOLUTION ORAL at 05:21

## 2023-07-03 RX ADMIN — HEPARIN SODIUM 5000 UNIT(S): 5000 INJECTION INTRAVENOUS; SUBCUTANEOUS at 18:04

## 2023-07-03 RX ADMIN — TAMSULOSIN HYDROCHLORIDE 0.4 MILLIGRAM(S): 0.4 CAPSULE ORAL at 21:08

## 2023-07-03 RX ADMIN — SODIUM CHLORIDE 75 MILLILITER(S): 9 INJECTION INTRAMUSCULAR; INTRAVENOUS; SUBCUTANEOUS at 11:26

## 2023-07-03 RX ADMIN — LACTULOSE 10 GRAM(S): 10 SOLUTION ORAL at 13:23

## 2023-07-03 RX ADMIN — SENNA PLUS 1 TABLET(S): 8.6 TABLET ORAL at 12:19

## 2023-07-03 RX ADMIN — Medication 250 MILLIGRAM(S): at 10:42

## 2023-07-03 RX ADMIN — PANTOPRAZOLE SODIUM 40 MILLIGRAM(S): 20 TABLET, DELAYED RELEASE ORAL at 05:21

## 2023-07-03 NOTE — DIETITIAN INITIAL EVALUATION ADULT - REASON FOR ADMISSION
Acute renal failure    Chart reviewed, events noted. This is a "68yoM with hx of HTN, BPH s/p TURP, GERD, right inguinal hernia presents with RLQ abdominal pain likely for kidney stones admitted for NICOLETTE"

## 2023-07-03 NOTE — PROGRESS NOTE ADULT - ASSESSMENT
68yoM with hx of HTN, BPH s/p TURP, GERD, right inguinal hernia presents with RLQ abdominal pain likely for kidney stones admitted for NICOLETTE. Also with UTI

## 2023-07-03 NOTE — DIETITIAN INITIAL EVALUATION ADULT - PERTINENT LABORATORY DATA
07-03    143  |  106  |  31<H>  ----------------------------<  79  4.7   |  27  |  2.51<H>    Ca    8.6      03 Jul 2023 06:50

## 2023-07-03 NOTE — PROGRESS NOTE ADULT - SUBJECTIVE AND OBJECTIVE BOX
PROGRESS NOTE:   Authored by Dr. Hiral Aleman MD, Available on MS Teams    Patient is a 68y old  Male who presents with a chief complaint of Acute renal failure    Chart reviewed, events noted. This is a "68yoM with hx of HTN, BPH s/p TURP, GERD, right inguinal hernia presents with RLQ abdominal pain likely for kidney stones admitted for NICOLETTE" (03 Jul 2023 11:16)      SUBJECTIVE / OVERNIGHT EVENTS: Patient feels well. No chest pain or shortness of breath. Urinating okay. Had a very small BM yesterday, still feels constipated    ADDITIONAL REVIEW OF SYSTEMS:    MEDICATIONS  (STANDING):  heparin   Injectable 5000 Unit(s) SubCutaneous every 12 hours  pantoprazole    Tablet 40 milliGRAM(s) Oral before breakfast  polyethylene glycol 3350 17 Gram(s) Oral every 12 hours  senna 1 Tablet(s) Oral daily  sodium chloride 0.9%. 1000 milliLiter(s) (75 mL/Hr) IV Continuous <Continuous>  tamsulosin 0.4 milliGRAM(s) Oral at bedtime    MEDICATIONS  (PRN):  acetaminophen     Tablet .. 650 milliGRAM(s) Oral every 6 hours PRN Temp greater or equal to 38C (100.4F), Mild Pain (1 - 3)  melatonin 3 milliGRAM(s) Oral at bedtime PRN Insomnia  ondansetron Injectable 4 milliGRAM(s) IV Push every 8 hours PRN Nausea and/or Vomiting      CAPILLARY BLOOD GLUCOSE        I&O's Summary    02 Jul 2023 07:01  -  03 Jul 2023 07:00  --------------------------------------------------------  IN: 3045 mL / OUT: 3850 mL / NET: -805 mL    03 Jul 2023 07:01  -  03 Jul 2023 13:05  --------------------------------------------------------  IN: 0 mL / OUT: 1500 mL / NET: -1500 mL        PHYSICAL EXAM:  Vital Signs Last 24 Hrs  T(C): 36.4 (03 Jul 2023 09:24), Max: 36.7 (02 Jul 2023 16:08)  T(F): 97.6 (03 Jul 2023 09:24), Max: 98.1 (02 Jul 2023 16:08)  HR: 77 (03 Jul 2023 09:24) (58 - 87)  BP: 137/74 (03 Jul 2023 09:24) (100/54 - 137/74)  BP(mean): --  RR: 18 (03 Jul 2023 09:24) (18 - 18)  SpO2: 100% (03 Jul 2023 09:24) (98% - 100%)    Parameters below as of 03 Jul 2023 09:24  Patient On (Oxygen Delivery Method): room air        CONSTITUTIONAL: NAD, well-developed  RESPIRATORY: Normal respiratory effort; lungs are clear to auscultation bilaterally  CARDIOVASCULAR: Regular rate and rhythm, normal S1 and S2, no murmur/rub/gallop; No lower extremity edema  ABDOMEN: Nontender to palpation, normoactive bowel sounds, no rebound/guarding  MUSCLOSKELETAL: no clubbing or cyanosis of digits; no joint swelling or tenderness to palpation  PSYCH: A+O to person, place, and time; affect appropriate    LABS:                        9.5    5.49  )-----------( 216      ( 03 Jul 2023 06:51 )             28.2     07-03    143  |  106  |  31<H>  ----------------------------<  79  4.7   |  27  |  2.51<H>    Ca    8.6      03 Jul 2023 06:50    Urinalysis Basic - ( 03 Jul 2023 06:50 )    Color: x / Appearance: x / SG: x / pH: x  Gluc: 79 mg/dL / Ketone: x  / Bili: x / Urobili: x   Blood: x / Protein: x / Nitrite: x   Leuk Esterase: x / RBC: x / WBC x   Sq Epi: x / Non Sq Epi: x / Bacteria: x

## 2023-07-03 NOTE — DIETITIAN INITIAL EVALUATION ADULT - ENERGY INTAKE
In-house pt reports improvement in PO intake and appetite, consuming breakfast during visit. No nausea, emesis noted. Last bowel movement 7/1. Pt interested in diet education regarding tips for managing constipation. Reviewed diet recommendations including improtance of adequate hydration and intake of fiber rich foods. Pt agreeable to trial of stewed prunes (reports prune juice ineffective). Pt ordered for senna, Miralax in-house. Pt also with questions regarding kidney stones and diet, reviewed recommendations. Written information provided. Patient with no nutrition-related questions at this time. Made aware RD remains available as needed.  Fair (50-75%)

## 2023-07-03 NOTE — PROGRESS NOTE ADULT - SUBJECTIVE AND OBJECTIVE BOX
[facility] DIVISION OF INFECTIOUS DISEASES  FRED Trinidad Y. Patel, S. Shah, G. William  [phone]  ([phone] - weekdays after 5pm and weekends)    Name: NANI SHI  Age/Gender: 68y Male  MRN: [id number]    Interval History:  Patient seen and examined this morning.   No new complaints noted.  Notes reviewed  No concerning overnight events  Afebrile   Allergies: No Known Allergies    Objective:  Vitals:   T(F): 97.7 (07-03-23 @ 05:22), Max: 98.1 (07-02-23 @ 16:08)  HR: 87 (07-03-23 @ 05:22) (58 - 87)  BP: 120/71 (07-03-23 @ 05:22) (100/54 - 127/71)  RR: 18 (07-03-23 @ 05:22) (18 - 18)  SpO2: 98% (07-03-23 @ 05:22) (98% - 99%)  Physical Examination:  General: no acute distress  HEENT: NC/AT, anicteric, neck supple  Respiratory: no acc muscle use, breathing comfortably  Cardiovascular: S1 and S2 present  Gastrointestinal: normal appearing, nondistended  Extremities: no edema, no cyanosis  Skin: no visible rash    Laboratory Studies:  CBC:                       9.5    5.49  )-----------( 216      ( 03 Jul 2023 06:51 )             28.2     WBC Trend:  5.49 07-03-23 @ 06:51  5.32 07-02-23 @ 07:07  8.42 06-30-23 @ 06:38    CMP: 07-03    143  |  106  |  31<H>  ----------------------------<  79  4.7   |  27  |  2.51<H>    Ca    8.6      03 Jul 2023 06:50      Creatinine: 2.51 mg/dL (07-03-23 @ 06:50)  Creatinine: 2.59 mg/dL (07-02-23 @ 07:06)  Creatinine: 3.22 mg/dL (07-01-23 @ 06:00)  Creatinine: 2.67 mg/dL (06-30-23 @ 19:04)  Creatinine: 3.37 mg/dL (06-30-23 @ 06:38)    Vancomycin Level, Random: 4.5 ug/mL (07-03-23 @ 06:52)    Microbiology: reviewed   Culture - Urine (collected 06-30-23 @ 08:47)  Source: Clean Catch Clean Catch (Midstream)  Preliminary Report (07-02-23 @ 10:37):    >100,000 CFU/ml Enterococcus species    Radiology: reviewed     Medications:  acetaminophen     Tablet .. 650 milliGRAM(s) Oral every 6 hours PRN  heparin   Injectable 5000 Unit(s) SubCutaneous every 12 hours  melatonin 3 milliGRAM(s) Oral at bedtime PRN  ondansetron Injectable 4 milliGRAM(s) IV Push every 8 hours PRN  pantoprazole    Tablet 40 milliGRAM(s) Oral before breakfast  polyethylene glycol 3350 17 Gram(s) Oral every 12 hours  senna 1 Tablet(s) Oral daily  sodium chloride 0.9%. 1000 milliLiter(s) IV Continuous <Continuous>  tamsulosin 0.4 milliGRAM(s) Oral at bedtime  vancomycin  IVPB 750 milliGRAM(s) IV Intermittent once    Current Antimicrobials:  vancomycin  IVPB 750 milliGRAM(s) IV Intermittent once    Prior/Completed Antimicrobials:  cefTRIAXone   IVPB  vancomycin  IVPB

## 2023-07-03 NOTE — PROGRESS NOTE ADULT - ASSESSMENT
Patient is a 67yo Male with HTN (diet controlled), BPH s/p TURP, GERD, right inguinal hernia p/w RLQ abdominal pain. Pt a/w UTI, NICOLETTE and nephrolithiasis. Nephrology consulted for Elevated serum creatinine.    1. NICOLETTE- previous SCr 1.14 on 4/21/23. NICOLETTE likely hemodynamically mediated. Renal function improving with IVF. +UA in the setting of infection. FeNa 1.89%; inconclusive and likely taken on IVF. CT with no hydro, multiple non obstructing  renal stones  and large stool burden. c/w good bowel regimen. c/w NS @ 75ml/hr. Encourage increased fluid intake >2L/day. Strict I/Os. Avoid nephrotoxins/ NSAIDs/ RCA. Monitor BMP.    2. Acute cystitis- +UA, UCx Enterococcus pt now on Vanco by level. ID following  3. Nephrolithiasis- multiple non obstructing stones. Renal US with large Rt sided stone 1.2 cm and left largest stone 0.8 mm. Difficult to pass and pt will need outpt Urology f/u.   As per pt, he has an appt with Urologist Dr. Saw Mccormack on 7/6.     4. BPH- s/p TURP. c/w [facility] NEPHROLOGY  Rayo Gan M.D.  Mitch Garcia D.O.  Lili Sampson M.D.  Rossy Cruz, MSN, ANP-C  [phone]  Fax: [phone]    [facility] [place] [address], [address]  [place] [address]

## 2023-07-03 NOTE — DIETITIAN INITIAL EVALUATION ADULT - ADD RECOMMEND
1) Continue regular diet as tolerated. 2) Diet education provided, reinforce as needed. 3) RD to remain available and follow-up as medically appropriate.

## 2023-07-03 NOTE — PROGRESS NOTE ADULT - SUBJECTIVE AND OBJECTIVE BOX
[facility] NEPHROLOGY- PROGRESS NOTE    Patient is a 69yo Male with HTN (diet controlled), BPH s/p TURP, GERD, right inguinal hernia p/w RLQ abdominal pain. Pt a/w UTI, NICOLETTE and nephrolithiasis. Nephrology consulted for Elevated serum creatinine.      Hospital Medications: Medications reviewed.  REVIEW OF SYSTEMS:  CONSTITUTIONAL: No fevers or chills  RESPIRATORY: No shortness of breath  CARDIOVASCULAR: No chest pain.  GASTROINTESTINAL: No nausea, vomiting, diarrhea + RLQ abdominal pain 2/10  VASCULAR: No bilateral lower extremity edema.     VITALS:  T(F): 97.7 (07-03-23 @ 05:22), Max: 98.1 (07-02-23 @ 16:08)  HR: 87 (07-03-23 @ 05:22)  BP: 120/71 (07-03-23 @ 05:22)  RR: 18 (07-03-23 @ 05:22)  SpO2: 98% (07-03-23 @ 05:22)  Wt(kg): --  Height (cm): 170.2 (06-30 @ 04:22)  Weight (kg): 59 (06-30 @ 04:22)  BMI (kg/m2): 20.4 (06-30 @ 04:22)  BSA (m2): 1.68 (06-30 @ 04:22)    07-02 @ 07:01  -  07-03 @ 07:00  --------------------------------------------------------  IN: 3045 mL / OUT: 3850 mL / NET: -805 mL    07-03 @ 07:01  -  07-03 @ 08:09  --------------------------------------------------------  IN: 0 mL / OUT: 800 mL / NET: -800 mL      PHYSICAL EXAM:  Constitutional: NAD  HEENT: anicteric sclera,   Respiratory: CTAB, no wheezes, rales or rhonchi  Cardiovascular: S1, S2, RRR  Gastrointestinal: BS+, soft, NT/ND  : No CVA tenderness b/l  Extremities: No peripheral edema    LABS:  07-03    143  |  106  |  31<H>  ----------------------------<  79  4.7   |  27  |  2.51<H>    Ca    8.6      03 Jul 2023 06:50      Creatinine Trend: 2.51 <--, 2.59 <--, 3.22 <--, 2.67 <--, 3.37 <--                        9.5    5.49  )-----------( 216      ( 03 Jul 2023 06:51 )             28.2     Urine Studies:  Urinalysis Basic - ( 03 Jul 2023 06:50 )    Color:  / Appearance:  / SG:  / pH:   Gluc: 79 mg/dL / Ketone:   / Bili:  / Urobili:    Blood:  / Protein:  / Nitrite:    Leuk Esterase:  / RBC:  / WBC    Sq Epi:  / Non Sq Epi:  / Bacteria:       Creatinine, Random Urine: 23 mg/dL (07-01 @ 16:14)  Protein/Creatinine Ratio Calculation: Unable to calculate (07-01 @ 16:14)  Sodium, Random Urine: 19 mmol/L (07-01 @ 16:14)    RADIOLOGY & ADDITIONAL STUDIES:

## 2023-07-03 NOTE — PROGRESS NOTE ADULT - ASSESSMENT
Patient is a 68 year old male with PMH of HTN, BPH s/p TURP, GERD, right inguinal hernia presents with RLQ abdominal pain and admitted for NICOLETTE.   RLQ pain likely due to stones-may have passed, possibly due to constipation  Acute cystitis with possible ascending UTI  NICOLETTE    initial UA on 6/30 with some pyuria 39 WBC and large LE  repeat UA on 7/1 with no significant pyuria 5 WBC and mod LE  Renal/bladder U/S with multiple b/l nonobstructive renal stones, no hydronephrosis, mild bladder wall thickening   CTAP with large stool burden, small R inguinal hernia, b/l nonobstructing renal stones and no hydronephrosis   s/p ceftriaxone   Afebrile, no leukocytosis     Recommendations:  Follow up urine culture (6/30) for Enterococcus sp - ID/sensitivities   Continue vancomycin - level subtherapeutic -- re-dose vancomycin 750mg IV once today   - check level in am prior to re-dosing, goal trough 10-15  -- will tailor antibiotics pending culture results   Nephrology following for NICOLETTE-Cr downtrending   Monitor temps/WBC, Cr       Ruperto Martinez M.D.  [facility], Division of Infectious Diseases  [phone]  After 5pm on weekdays and all day on weekends - please call [phone]

## 2023-07-03 NOTE — DIETITIAN INITIAL EVALUATION ADULT - PERTINENT MEDS FT
MEDICATIONS  (STANDING):  heparin   Injectable 5000 Unit(s) SubCutaneous every 12 hours  pantoprazole    Tablet 40 milliGRAM(s) Oral before breakfast  polyethylene glycol 3350 17 Gram(s) Oral every 12 hours  senna 1 Tablet(s) Oral daily  sodium chloride 0.9%. 1000 milliLiter(s) (75 mL/Hr) IV Continuous <Continuous>  tamsulosin 0.4 milliGRAM(s) Oral at bedtime  vancomycin  IVPB 750 milliGRAM(s) IV Intermittent once    MEDICATIONS  (PRN):  acetaminophen     Tablet .. 650 milliGRAM(s) Oral every 6 hours PRN Temp greater or equal to 38C (100.4F), Mild Pain (1 - 3)  melatonin 3 milliGRAM(s) Oral at bedtime PRN Insomnia  ondansetron Injectable 4 milliGRAM(s) IV Push every 8 hours PRN Nausea and/or Vomiting

## 2023-07-03 NOTE — DIETITIAN INITIAL EVALUATION ADULT - OTHER INFO
Weight: Pt reports weight realtively stable ~ 127lbs, states he may have gained some weight while admitted due to return of appetite and limited activity. Reports being active at home.

## 2023-07-03 NOTE — DIETITIAN INITIAL EVALUATION ADULT - ORAL INTAKE PTA/DIET HISTORY
Pt reports good PO intake and appetite up until ~ 2days PTA during he developed nausea and lost of appetite. At baseline pt tries to consumes a general healthy diet consisting of whole grains, vegetables, lean protein, reduced sodium. Avoids red meat but does consume fish, tofu, beans and chicken on occasion. Admits he doesn't drink as much water as he should at home.  NKFA. Pt denies chewing/swallowing difficulty, vomiting, diarrhea. Pt reports chronic constipation, does consume dried prunes on a regular basis but is looking for alternatives to help with constipation.

## 2023-07-03 NOTE — DIETITIAN INITIAL EVALUATION ADULT - REASON
no overt signs of muscle and/or fat depletion noted upon visual assessment, good PO intake at baseline, denies weight loss

## 2023-07-04 ENCOUNTER — TRANSCRIPTION ENCOUNTER (OUTPATIENT)
Age: 68
End: 2023-07-04

## 2023-07-04 VITALS
TEMPERATURE: 98 F | OXYGEN SATURATION: 98 % | HEART RATE: 76 BPM | SYSTOLIC BLOOD PRESSURE: 111 MMHG | RESPIRATION RATE: 18 BRPM | DIASTOLIC BLOOD PRESSURE: 67 MMHG

## 2023-07-04 LAB
-  AMPICILLIN: SIGNIFICANT CHANGE UP
-  CIPROFLOXACIN: SIGNIFICANT CHANGE UP
-  LEVOFLOXACIN: SIGNIFICANT CHANGE UP
-  NITROFURANTOIN: SIGNIFICANT CHANGE UP
-  TETRACYCLINE: SIGNIFICANT CHANGE UP
-  VANCOMYCIN: SIGNIFICANT CHANGE UP
ANION GAP SERPL CALC-SCNC: 10 MMOL/L — SIGNIFICANT CHANGE UP (ref 5–17)
BUN SERPL-MCNC: 30 MG/DL — HIGH (ref 7–23)
CALCIUM SERPL-MCNC: 8.5 MG/DL — SIGNIFICANT CHANGE UP (ref 8.4–10.5)
CHLORIDE SERPL-SCNC: 109 MMOL/L — HIGH (ref 96–108)
CO2 SERPL-SCNC: 26 MMOL/L — SIGNIFICANT CHANGE UP (ref 22–31)
CREAT SERPL-MCNC: 2.53 MG/DL — HIGH (ref 0.5–1.3)
CULTURE RESULTS: SIGNIFICANT CHANGE UP
EGFR: 27 ML/MIN/1.73M2 — LOW
GLUCOSE SERPL-MCNC: 81 MG/DL — SIGNIFICANT CHANGE UP (ref 70–99)
MAGNESIUM SERPL-MCNC: 1.9 MG/DL — SIGNIFICANT CHANGE UP (ref 1.6–2.6)
METHOD TYPE: SIGNIFICANT CHANGE UP
ORGANISM # SPEC MICROSCOPIC CNT: SIGNIFICANT CHANGE UP
ORGANISM # SPEC MICROSCOPIC CNT: SIGNIFICANT CHANGE UP
PHOSPHATE SERPL-MCNC: 4.1 MG/DL — SIGNIFICANT CHANGE UP (ref 2.5–4.5)
POTASSIUM SERPL-MCNC: 4.8 MMOL/L — SIGNIFICANT CHANGE UP (ref 3.5–5.3)
POTASSIUM SERPL-SCNC: 4.8 MMOL/L — SIGNIFICANT CHANGE UP (ref 3.5–5.3)
SODIUM SERPL-SCNC: 145 MMOL/L — SIGNIFICANT CHANGE UP (ref 135–145)
SPECIMEN SOURCE: SIGNIFICANT CHANGE UP
VANCOMYCIN FLD-MCNC: 8.6 UG/ML — SIGNIFICANT CHANGE UP

## 2023-07-04 PROCEDURE — 82435 ASSAY OF BLOOD CHLORIDE: CPT

## 2023-07-04 PROCEDURE — 85730 THROMBOPLASTIN TIME PARTIAL: CPT

## 2023-07-04 PROCEDURE — 81001 URINALYSIS AUTO W/SCOPE: CPT

## 2023-07-04 PROCEDURE — 84100 ASSAY OF PHOSPHORUS: CPT

## 2023-07-04 PROCEDURE — 87186 SC STD MICRODIL/AGAR DIL: CPT

## 2023-07-04 PROCEDURE — 85018 HEMOGLOBIN: CPT

## 2023-07-04 PROCEDURE — 84300 ASSAY OF URINE SODIUM: CPT

## 2023-07-04 PROCEDURE — 87077 CULTURE AEROBIC IDENTIFY: CPT

## 2023-07-04 PROCEDURE — 84295 ASSAY OF SERUM SODIUM: CPT

## 2023-07-04 PROCEDURE — 99239 HOSP IP/OBS DSCHRG MGMT >30: CPT | Mod: GC

## 2023-07-04 PROCEDURE — G0378: CPT

## 2023-07-04 PROCEDURE — 76770 US EXAM ABDO BACK WALL COMP: CPT

## 2023-07-04 PROCEDURE — 87086 URINE CULTURE/COLONY COUNT: CPT

## 2023-07-04 PROCEDURE — 99285 EMERGENCY DEPT VISIT HI MDM: CPT | Mod: 25

## 2023-07-04 PROCEDURE — 96365 THER/PROPH/DIAG IV INF INIT: CPT

## 2023-07-04 PROCEDURE — 36415 COLL VENOUS BLD VENIPUNCTURE: CPT

## 2023-07-04 PROCEDURE — 82570 ASSAY OF URINE CREATININE: CPT

## 2023-07-04 PROCEDURE — 84156 ASSAY OF PROTEIN URINE: CPT

## 2023-07-04 PROCEDURE — 82330 ASSAY OF CALCIUM: CPT

## 2023-07-04 PROCEDURE — 96368 THER/DIAG CONCURRENT INF: CPT

## 2023-07-04 PROCEDURE — 83690 ASSAY OF LIPASE: CPT

## 2023-07-04 PROCEDURE — 74176 CT ABD & PELVIS W/O CONTRAST: CPT | Mod: MA

## 2023-07-04 PROCEDURE — 83540 ASSAY OF IRON: CPT

## 2023-07-04 PROCEDURE — 82947 ASSAY GLUCOSE BLOOD QUANT: CPT

## 2023-07-04 PROCEDURE — 96366 THER/PROPH/DIAG IV INF ADDON: CPT

## 2023-07-04 PROCEDURE — 85014 HEMATOCRIT: CPT

## 2023-07-04 PROCEDURE — 85025 COMPLETE CBC W/AUTO DIFF WBC: CPT

## 2023-07-04 PROCEDURE — 83735 ASSAY OF MAGNESIUM: CPT

## 2023-07-04 PROCEDURE — 80053 COMPREHEN METABOLIC PANEL: CPT

## 2023-07-04 PROCEDURE — 82803 BLOOD GASES ANY COMBINATION: CPT

## 2023-07-04 PROCEDURE — 84132 ASSAY OF SERUM POTASSIUM: CPT

## 2023-07-04 PROCEDURE — 85610 PROTHROMBIN TIME: CPT

## 2023-07-04 PROCEDURE — 80048 BASIC METABOLIC PNL TOTAL CA: CPT

## 2023-07-04 PROCEDURE — 83550 IRON BINDING TEST: CPT

## 2023-07-04 PROCEDURE — 80202 ASSAY OF VANCOMYCIN: CPT

## 2023-07-04 PROCEDURE — 83605 ASSAY OF LACTIC ACID: CPT

## 2023-07-04 PROCEDURE — 85027 COMPLETE CBC AUTOMATED: CPT

## 2023-07-04 RX ORDER — VANCOMYCIN HCL 1 G
750 VIAL (EA) INTRAVENOUS ONCE
Refills: 0 | Status: COMPLETED | OUTPATIENT
Start: 2023-07-04 | End: 2023-07-04

## 2023-07-04 RX ORDER — TAMSULOSIN HYDROCHLORIDE 0.4 MG/1
1 CAPSULE ORAL
Qty: 90 | Refills: 0
Start: 2023-07-04 | End: 2023-10-01

## 2023-07-04 RX ADMIN — HEPARIN SODIUM 5000 UNIT(S): 5000 INJECTION INTRAVENOUS; SUBCUTANEOUS at 06:26

## 2023-07-04 RX ADMIN — Medication 250 MILLIGRAM(S): at 11:29

## 2023-07-04 RX ADMIN — PANTOPRAZOLE SODIUM 40 MILLIGRAM(S): 20 TABLET, DELAYED RELEASE ORAL at 06:26

## 2023-07-04 NOTE — CHART NOTE - NSCHARTNOTEFT_GEN_A_CORE
To Whom It May Concern:    Please excuse Drake Reed (: 1955) from 2023 to 2023. He was admitted to [facility] during this time. He may return to work without any restrictions upon discharge.    Sincerely,  Mando Machuca M.D.

## 2023-07-04 NOTE — DISCHARGE NOTE PROVIDER - CARE PROVIDER_API CALL
Ruperto Martinez  Infectious Disease  [address] [facility], [address]  [place] [address]  Phone: [phone]  Fax: [phone]  Established Patient  Follow Up Time: Routine    Lili Sampson  Nephrology  [address] [place] [address] Road, [address]  [place], [place] [id number]  Phone: [phone]  Fax: [phone]  Established Patient  Follow Up Time: 1 week   Ruperto Martinez.  Infectious Disease  [address] [facility], [address]  [place] [address]  Phone: [phone]  Fax: [phone]  Established Patient  Follow Up Time: Routine    Lili Sampson  Nephrology  [address] [place] [address] Road, [address]  [place], [place] [id number]  Phone: [phone]  Fax: [phone]  Established Patient  Follow Up Time: 1 week    Carolina Galarza [facility]  Surgery  [address] [place] [address] Level [place] [address]  Phone: [phone]  Fax: [phone]  Established Patient  Follow Up Time: Routine

## 2023-07-04 NOTE — PROGRESS NOTE ADULT - PROBLEM SELECTOR PLAN 6
hx of turp 2022  -bladder scan for urinary retention  -Has close follow up with uro Mccormack 7/6
dvt ppx: heparin sub q  Diet: regular  dispo: pending
hx of turp 2022  -bladder scan for urinary retention  -Has close follow up with uro Mccormack 7/6

## 2023-07-04 NOTE — PROGRESS NOTE ADULT - PROVIDER SPECIALTY LIST ADULT
Internal Medicine
Internal Medicine
Nephrology
Infectious Disease
Infectious Disease
Nephrology
Internal Medicine

## 2023-07-04 NOTE — DISCHARGE NOTE PROVIDER - NSDCMRMEDTOKEN_GEN_ALL_CORE_FT
amoxicillin 500 mg oral tablet: 1 tab(s) orally 2 times a day  esomeprazole 40 mg oral delayed release capsule: 1 orally once a day (at bedtime)  Flomax 0.4 mg oral capsule: 1 cap(s) orally once a day (at bedtime)

## 2023-07-04 NOTE — PROGRESS NOTE ADULT - PROBLEM SELECTOR PLAN 3
chronic anemic  -check iron and tibc  -monitor hgb
Pain resolved. Suspect passage of kidney stone +/- constipation. CT ab/P demonstrated multiple nonobstructing renal stones, largest right stone 8mm and largest left stone 3mm). Right inguinal hernia reducible  -monitor  -encourage PO hydration  -Miralax BID (last BM 7/1 AM)  -Senna  -surgery consulted- outpatient f/u for inguinal hernia (Carolina Galarza)
Pain resolved. Suspect passage of kidney stone +/- constipation. CT ab/P demonstrated multiple nonobstructing renal stones, largest right stone 8mm and largest left stone 3mm). Right inguinal hernia reducible  -monitor  -encourage PO hydration  -Miralax BID (last BM 7/1 AM)  -Senna  -surgery consulted- outpatient f/u for inguinal hernia

## 2023-07-04 NOTE — PROGRESS NOTE ADULT - ASSESSMENT
Patient is a 68 year old male with PMH of HTN, BPH s/p TURP, GERD, right inguinal hernia presents with RLQ abdominal pain and admitted for NICOLETTE.   RLQ pain likely due to stones-may have passed, possibly due to constipation    Acute cystitis with possible ascending UTI  NICOLETTE    initial UA on 6/30 with some pyuria 39 WBC and large LE  repeat UA on 7/1 with no significant pyuria 5 WBC and mod LE  Renal/bladder U/S with multiple b/l nonobstructive renal stones, no hydronephrosis, mild bladder wall thickening   CTAP with large stool burden, small R inguinal hernia, b/l nonobstructing renal stones and no hydronephrosis   s/p ceftriaxone   Afebrile, no leukocytosis     Recommendations:  Follow up Ucx (6/30) for Enterococcus faecalis sensitivities   Continue vancomycin -- give 750mg IV today (level noted)   - check level in am prior to re-dosing, goal trough 10-15  -- will tailor antibiotics pending culture results   Nephrology following for NICOLETTE-Cr stable   Monitor temps/WBC, Cr       Ruperto Martinez M.D.  [facility], Division of Infectious Diseases  [phone]  After 5pm on weekdays and all day on weekends - please call [phone] Patient is a 68 year old male with PMH of HTN, BPH s/p TURP, GERD, right inguinal hernia presents with RLQ abdominal pain and admitted for NICOLETTE.   RLQ pain likely due to stones-may have passed, possibly due to constipation    Acute cystitis with possible ascending UTI  NICOLETTE    initial UA on 6/30 with some pyuria 39 WBC and large LE  repeat UA on 7/1 with no significant pyuria 5 WBC and mod LE  Renal/bladder U/S with multiple b/l nonobstructive renal stones, no hydronephrosis, mild bladder wall thickening   CTAP with large stool burden, small R inguinal hernia, b/l nonobstructing renal stones and no hydronephrosis   s/p ceftriaxone   Afebrile, no leukocytosis     Recommendations:  Ucx (6/30) Enterococcus faecalis ampicillin sensitive   Discontinue vancomycin  Start on amoxicillin 500mg PO BID (renally adjusted) to complete total 7d course   Nephrology following for NICOLETTE-Cr stable   Monitor temps/WBC, Cr     D/w Dr. Sven Martinez M.D.  [facility], Division of Infectious Diseases  [phone]  After 5pm on weekdays and all day on weekends - please call [phone]

## 2023-07-04 NOTE — PROGRESS NOTE ADULT - SUBJECTIVE AND OBJECTIVE BOX
Patient is a 68y old  Male who presents with a chief complaint of rosy (04 Jul 2023 12:38)      SUBJECTIVE / OVERNIGHT EVENTS: Patient seen and examined at bedside. No acute events overnight. Feels well without complaints. Denies dysuria/polyuria.    MEDICATIONS  (STANDING):  heparin   Injectable 5000 Unit(s) SubCutaneous every 12 hours  pantoprazole    Tablet 40 milliGRAM(s) Oral before breakfast  polyethylene glycol 3350 17 Gram(s) Oral every 12 hours  senna 1 Tablet(s) Oral daily  sodium chloride 0.9%. 1000 milliLiter(s) (75 mL/Hr) IV Continuous <Continuous>  tamsulosin 0.4 milliGRAM(s) Oral at bedtime    MEDICATIONS  (PRN):  acetaminophen     Tablet .. 650 milliGRAM(s) Oral every 6 hours PRN Temp greater or equal to 38C (100.4F), Mild Pain (1 - 3)  melatonin 3 milliGRAM(s) Oral at bedtime PRN Insomnia  ondansetron Injectable 4 milliGRAM(s) IV Push every 8 hours PRN Nausea and/or Vomiting      CAPILLARY BLOOD GLUCOSE        I&O's Summary    03 Jul 2023 07:01  -  04 Jul 2023 07:00  --------------------------------------------------------  IN: 3060 mL / OUT: 2850 mL / NET: 210 mL    04 Jul 2023 07:01  -  04 Jul 2023 12:44  --------------------------------------------------------  IN: 570 mL / OUT: 600 mL / NET: -30 mL        PHYSICAL EXAM:  Vital Signs Last 24 Hrs  T(C): 36.8 (04 Jul 2023 10:07), Max: 37.6 (03 Jul 2023 14:10)  T(F): 98.2 (04 Jul 2023 10:07), Max: 99.7 (03 Jul 2023 14:10)  HR: 76 (04 Jul 2023 10:07) (57 - 100)  BP: 111/67 (04 Jul 2023 10:07) (101/53 - 140/69)  BP(mean): --  RR: 18 (04 Jul 2023 10:07) (18 - 18)  SpO2: 98% (04 Jul 2023 10:07) (96% - 100%)    Parameters below as of 04 Jul 2023 10:07  Patient On (Oxygen Delivery Method): room air        GEN: male in NAD, appears comfortable, no diaphoresis  EYES: No scleral injection, PERRL, EOMI  ENTM: neck supple & symmetric without tracheal deviation, moist membranes, no gross hearing impairment, thyroid gland not enlarged  CV: +S1/S2, no m/r/g, no abdominal bruit, no LE edema  RESP: breathing comfortably, no respiratory accessory muscle use, CTAB, no w/r/r  GI: normoactive BS, soft, NTND, no rebounding/guarding, no palpable masses    LABS:                        9.5    5.49  )-----------( 216      ( 03 Jul 2023 06:51 )             28.2     07-04    145  |  109<H>  |  30<H>  ----------------------------<  81  4.8   |  26  |  2.53<H>    Ca    8.5      04 Jul 2023 08:11  Phos  4.1     07-04  Mg     1.9     07-04            Urinalysis Basic - ( 04 Jul 2023 08:11 )    Color: x / Appearance: x / SG: x / pH: x  Gluc: 81 mg/dL / Ketone: x  / Bili: x / Urobili: x   Blood: x / Protein: x / Nitrite: x   Leuk Esterase: x / RBC: x / WBC x   Sq Epi: x / Non Sq Epi: x / Bacteria: x          RADIOLOGY & ADDITIONAL TESTS:  Results Reviewed:   Imaging Personally Reviewed:  Electrocardiogram Personally Reviewed:    COORDINATION OF CARE:  Care Discussed with Consultants/Other Providers [Y/N]:  Prior or Outpatient Records Reviewed [Y/N]:

## 2023-07-04 NOTE — PROGRESS NOTE ADULT - ASSESSMENT
68y Male with history of HTN, BPH s/p TURP presents with ab pain found to have UTI and nephrolithiasis. Nephrology consulted for elevated Scr.    1. NICOLETTE: likely due to ATN in setting of infection. UA bland. FeNa elevated. Renal US without hydronephrosis. Can continue with IVF as patient appears dry. Avoid nephrotoxins. Monitor electrolytes.  2. Acute cystitis: On Vancomycin. As per ID.  3. Nephrolithiasis: Non-obstructive. Given large size, patient following with Urology with plans for likely intervention.    4. BPH: s/p TURP. Continue with flomax.    No renal objection to discharge but patient will need outpatient follow up for monitoring for resolution of NICOLETTE.      [facility] NEPHROLOGY  Rayo Gan M.D.  Mitch Garcia D.O.  Lili Sampson M.D.  Rossy Cruz, MSN, ANP-C  [phone]  Fax: [phone]    [facility] [place] [address], [address]  [place] [address]

## 2023-07-04 NOTE — PROGRESS NOTE ADULT - PROBLEM SELECTOR PLAN 2
UCx growing Enterococcus  - ID recs appreciated  - continue vanc qd (vanc trough low this am)  - f/u vanc level in am
UCx growing Enterococcus  - ID recs appreciated  - Vanc given as inpatient  - Discharge on renally dosed amoxicillin (CrCl <30)
Pain resolved. Suspect passage of kidney stone +/- constipation. CT ab/P demonstrated multiple nonobstructing renal stones, largest right stone 8mm and largest left stone 3mm). Right inguinal hernia reducible  -monitor  -encourage PO hydration  -Miralax BID (last BM 7/1 AM)  -Start Senna  -surgery consulted- outpatient f/u for inguinal hernia

## 2023-07-04 NOTE — PROGRESS NOTE ADULT - SUBJECTIVE AND OBJECTIVE BOX
[facility] NEPHROLOGY- PROGRESS NOTE    68y Male with history of HTN, BPH s/p TURP presents with ab pain found to have UTI and nephrolithiasis. Nephrology consulted for elevated Scr.    REVIEW OF SYSTEMS:  Gen: no fevers  Cards: no chest pain  Resp: no dyspnea  GI: + nausea, no vomiting or diarrhea  Vascular: no LE edema    No Known Allergies      Hospital Medications: Medications reviewed    VITALS:  T(F): 98.2 (07-04-23 @ 10:07), Max: 99.7 (07-03-23 @ 14:10)  HR: 76 (07-04-23 @ 10:07)  BP: 111/67 (07-04-23 @ 10:07)  RR: 18 (07-04-23 @ 10:07)  SpO2: 98% (07-04-23 @ 10:07)  Wt(kg): --  Height (cm): 170.2 (06-30 @ 04:22)  Weight (kg): 59 (06-30 @ 04:22)  BMI (kg/m2): 20.4 (06-30 @ 04:22)  BSA (m2): 1.68 (06-30 @ 04:22)    07-03 @ 07:01  -  07-04 @ 07:00  --------------------------------------------------------  IN: 3060 mL / OUT: 2850 mL / NET: 210 mL    07-04 @ 07:01  -  07-04 @ 11:23  --------------------------------------------------------  IN: 320 mL / OUT: 600 mL / NET: -280 mL        PHYSICAL EXAM:    Gen: NAD, calm, dry MM  Cards: RRR, +S1/S2, no M/G/R  Resp: CTA B/L  GI: soft, NT/ND, NABS  Vascular: no LE edema B/L    LABS:  07-04    145  |  109<H>  |  30<H>  ----------------------------<  81  4.8   |  26  |  2.53<H>    Ca    8.5      04 Jul 2023 08:11  Phos  4.1     07-04  Mg     1.9     07-04      Creatinine Trend: 2.53 <--, 2.51 <--, 2.59 <--, 3.22 <--, 2.67 <--, 3.37 <--                        9.5    5.49  )-----------( 216      ( 03 Jul 2023 06:51 )             28.2     Urine Studies:  Urinalysis Basic - ( 04 Jul 2023 08:11 )    Color:  / Appearance:  / SG:  / pH:   Gluc: 81 mg/dL / Ketone:   / Bili:  / Urobili:    Blood:  / Protein:  / Nitrite:    Leuk Esterase:  / RBC:  / WBC    Sq Epi:  / Non Sq Epi:  / Bacteria:       Creatinine, Random Urine: 23 mg/dL (07-01 @ 16:14)  Protein/Creatinine Ratio Calculation: Unable to calculate (07-01 @ 16:14)  Sodium, Random Urine: 19 mmol/L (07-01 @ 16:14)      RADIOLOGY & ADDITIONAL STUDIES:

## 2023-07-04 NOTE — DISCHARGE NOTE PROVIDER - HOSPITAL COURSE
68yoM with hx of HTN, BPH s/p TURP, GERD, right inguinal hernia presents with RLQ abdominal pain likely for kidney stones admitted for NICOLETTE. Also with UTI. Patient had CT scan which showed non-obstructing nephrolithiasis and renal US also performed. Initially in CDU. Seen by surgery for right inguinal hernia, but they recommended outpatient follow up. Seen by renal who believe possible ATN and mainly needs supportive care. Seen by ID for enterococcus UTI. Likely penicillin will be effective. Patient started on Flomax for nephrolithiasis. He will follow up with ID, renal and urology outpatient.

## 2023-07-04 NOTE — PROGRESS NOTE ADULT - PROBLEM SELECTOR PLAN 4
no home anti-htn meds  -BP acceptable range  -regular diet (patient does not want restriction)
chronic anemic  -monitor hgb
chronic anemic  -monitor hgb

## 2023-07-04 NOTE — PROGRESS NOTE ADULT - PROBLEM SELECTOR PLAN 1
Initial Cr 3.37-> 2.67-> 3.22 s/p IVF. Cr on 04/23 noted to be normal. CT ab/p (multiple nonobstructing renal stones, largest right stone 8mm and largest left stone 3mm). PVR <200 (no urinary retention). Given 1 dose ctx in the ED. UA notable for rbc, esterase, and wbc    -Renal US redemonstrating nephrolithiasis which aren't obstructing --> IVF and Flomax for medical expulsive therapy  -Nephrology recs appreciated, continue IVF  -Monitor Cr  -avoid nephrotoxins
Initial Cr 3.37-> 2.67-> 3.22 s/p IVF. Cr on 04/23 noted to be normal. CT ab/p (multiple nonobstructing renal stones, largest right stone 8mm and largest left stone 3mm). PVR <200 (no urinary retention). Given 1 dose ctx in the ED. UA notable for rbc, esterase, and wbc    -Feels he is emptying bladder, ensure not retaining  -Empirically on CTX, but unsure if there is utility in this --> ID consult  -Renal US redemonstrating nephrolithiasis which aren't obstructing --> IVF and Flomax for medical expulsive therapy  -Nephrology consulted: Adrián  -Monitor Cr
Initial Cr 3.37-> 2.67-> 3.22 s/p IVF. Cr on 04/23 noted to be normal. CT ab/p (multiple nonobstructing renal stones, largest right stone 8mm and largest left stone 3mm). PVR <200 (no urinary retention). Given 1 dose ctx in the ED. UA notable for rbc, esterase, and wbc    -Renal US redemonstrating nephrolithiasis which aren't obstructing --> IVF and Flomax for medical expulsive therapy  -Nephrology recs appreciated, continue IVF  -Monitor Cr  - avoid nephrotoxins
170.18

## 2023-07-04 NOTE — PROGRESS NOTE ADULT - NSPROGADDITIONALINFOA_GEN_ALL_CORE
Discussed with renal and ID. Medically optimized for discharge. Close outpatient follow up with renal & surgery advised. Can follow up with ID should urinary symptoms arise.    Discharge Time: 40 minutes

## 2023-07-04 NOTE — PROGRESS NOTE ADULT - SUBJECTIVE AND OBJECTIVE BOX
[facility] DIVISION OF INFECTIOUS DISEASES  FRED Trinidad Y. Patel, S. Shah, G. William  [phone]  ([phone] - weekdays after 5pm and weekends)    Name: NANI SHI  Age/Gender: 68y Male  MRN: [id number]    Interval History:  Patient seen and examined this morning.  Feels better, n o new complaints noted.  Notes reviewed  No concerning overnight events  Afebrile   Allergies: No Known Allergies      Objective:  Vitals:   T(F): 98.2 (07-04-23 @ 10:07), Max: 99.7 (07-03-23 @ 14:10)  HR: 76 (07-04-23 @ 10:07) (57 - 100)  BP: 111/67 (07-04-23 @ 10:07) (101/53 - 140/69)  RR: 18 (07-04-23 @ 10:07) (18 - 18)  SpO2: 98% (07-04-23 @ 10:07) (96% - 100%)  Physical Examination:  General: no acute distress  HEENT: NC/AT, anicteric, neck supple  Respiratory: no acc muscle use, breathing comfortably  Cardiovascular: S1 and S2 present  Gastrointestinal: normal appearing, nondistended  Extremities: no edema, no cyanosis  Skin: no visible rash    Laboratory Studies:  CBC:                       9.5    5.49  )-----------( 216      ( 03 Jul 2023 06:51 )             28.2     WBC Trend:  5.49 07-03-23 @ 06:51  5.32 07-02-23 @ 07:07  8.42 06-30-23 @ 06:38    CMP: 07-04    145  |  109<H>  |  30<H>  ----------------------------<  81  4.8   |  26  |  2.53<H>    Ca    8.5      04 Jul 2023 08:11  Phos  4.1     07-04  Mg     1.9     07-04      Creatinine: 2.53 mg/dL (07-04-23 @ 08:11)  Creatinine: 2.51 mg/dL (07-03-23 @ 06:50)  Creatinine: 2.59 mg/dL (07-02-23 @ 07:06)  Creatinine: 3.22 mg/dL (07-01-23 @ 06:00)  Creatinine: 2.67 mg/dL (06-30-23 @ 19:04)  Creatinine: 3.37 mg/dL (06-30-23 @ 06:38)    Vancomycin Level, Random: 8.6 ug/mL (07-04-23 @ 08:12)  Vancomycin Level, Random: 4.5 ug/mL (07-03-23 @ 06:52)    Microbiology: reviewed   Culture - Urine (collected 06-30-23 @ 08:47)  Source: Clean Catch Clean Catch (Midstream)  Preliminary Report (07-03-23 @ 15:25):    >100,000 CFU/ml Enterococcus faecalis    Radiology: reviewed   < from: US Kidney and Bladder (07.01.23 @ 23:26) >  IMPRESSION:    Multiple bilateral nonobstructive renal stones. No hydronephrosis.    Mild bladder wall thickening. Correlate with urinalysis and lab values to   assess for cystitis and/or ascending urinary tract infection.    < end of copied text >    Medications:  acetaminophen     Tablet .. 650 milliGRAM(s) Oral every 6 hours PRN  heparin   Injectable 5000 Unit(s) SubCutaneous every 12 hours  melatonin 3 milliGRAM(s) Oral at bedtime PRN  ondansetron Injectable 4 milliGRAM(s) IV Push every 8 hours PRN  pantoprazole    Tablet 40 milliGRAM(s) Oral before breakfast  polyethylene glycol 3350 17 Gram(s) Oral every 12 hours  senna 1 Tablet(s) Oral daily  sodium chloride 0.9%. 1000 milliLiter(s) IV Continuous <Continuous>  tamsulosin 0.4 milliGRAM(s) Oral at bedtime  vancomycin  IVPB 750 milliGRAM(s) IV Intermittent once    Current Antimicrobials:  vancomycin  IVPB 750 milliGRAM(s) IV Intermittent once    Prior/Completed Antimicrobials:  cefTRIAXone   IVPB  vancomycin  IVPB  vancomycin  IVPB   [facility] DIVISION OF INFECTIOUS DISEASES  FRED Trinidad Y. Patel, S. Shah, G. William  [phone]  ([phone] - weekdays after 5pm and weekends)    Name: NANI SHI  Age/Gender: 68y Male  MRN: [id number]    Interval History:  Patient seen and examined this morning.  Feels better, no new complaints noted.  Notes reviewed  No concerning overnight events  Afebrile   Allergies: No Known Allergies      Objective:  Vitals:   T(F): 98.2 (07-04-23 @ 10:07), Max: 99.7 (07-03-23 @ 14:10)  HR: 76 (07-04-23 @ 10:07) (57 - 100)  BP: 111/67 (07-04-23 @ 10:07) (101/53 - 140/69)  RR: 18 (07-04-23 @ 10:07) (18 - 18)  SpO2: 98% (07-04-23 @ 10:07) (96% - 100%)  Physical Examination:  General: no acute distress  HEENT: NC/AT, anicteric, neck supple  Respiratory: no acc muscle use, breathing comfortably  Cardiovascular: S1 and S2 present  Gastrointestinal: normal appearing, nondistended  Extremities: no edema, no cyanosis  Skin: no visible rash    Laboratory Studies:  CBC:                       9.5    5.49  )-----------( 216      ( 03 Jul 2023 06:51 )             28.2     WBC Trend:  5.49 07-03-23 @ 06:51  5.32 07-02-23 @ 07:07  8.42 06-30-23 @ 06:38    CMP: 07-04    145  |  109<H>  |  30<H>  ----------------------------<  81  4.8   |  26  |  2.53<H>    Ca    8.5      04 Jul 2023 08:11  Phos  4.1     07-04  Mg     1.9     07-04      Creatinine: 2.53 mg/dL (07-04-23 @ 08:11)  Creatinine: 2.51 mg/dL (07-03-23 @ 06:50)  Creatinine: 2.59 mg/dL (07-02-23 @ 07:06)  Creatinine: 3.22 mg/dL (07-01-23 @ 06:00)  Creatinine: 2.67 mg/dL (06-30-23 @ 19:04)  Creatinine: 3.37 mg/dL (06-30-23 @ 06:38)    Vancomycin Level, Random: 8.6 ug/mL (07-04-23 @ 08:12)  Vancomycin Level, Random: 4.5 ug/mL (07-03-23 @ 06:52)    Microbiology: reviewed   Culture - Urine (collected 06-30-23 @ 08:47)  Source: Clean Catch Clean Catch (Midstream)  Preliminary Report (07-03-23 @ 15:25):    >100,000 CFU/ml Enterococcus faecalis    Radiology: reviewed   < from: US Kidney and Bladder (07.01.23 @ 23:26) >  IMPRESSION:    Multiple bilateral nonobstructive renal stones. No hydronephrosis.    Mild bladder wall thickening. Correlate with urinalysis and lab values to   assess for cystitis and/or ascending urinary tract infection.    < end of copied text >    Medications:  acetaminophen     Tablet .. 650 milliGRAM(s) Oral every 6 hours PRN  heparin   Injectable 5000 Unit(s) SubCutaneous every 12 hours  melatonin 3 milliGRAM(s) Oral at bedtime PRN  ondansetron Injectable 4 milliGRAM(s) IV Push every 8 hours PRN  pantoprazole    Tablet 40 milliGRAM(s) Oral before breakfast  polyethylene glycol 3350 17 Gram(s) Oral every 12 hours  senna 1 Tablet(s) Oral daily  sodium chloride 0.9%. 1000 milliLiter(s) IV Continuous <Continuous>  tamsulosin 0.4 milliGRAM(s) Oral at bedtime  vancomycin  IVPB 750 milliGRAM(s) IV Intermittent once    Current Antimicrobials:  vancomycin  IVPB 750 milliGRAM(s) IV Intermittent once    Prior/Completed Antimicrobials:  cefTRIAXone   IVPB  vancomycin  IVPB  vancomycin  IVPB

## 2023-07-04 NOTE — DISCHARGE NOTE PROVIDER - PROVIDER TOKENS
PROVIDER:[TOKEN:[[id number]],FOLLOWUP:[Routine],ESTABLISHEDPATIENT:[T]],PROVIDER:[TOKEN:[[id number]],FOLLOWUP:[1 week],ESTABLISHEDPATIENT:[T]] PROVIDER:[TOKEN:[[id number]],FOLLOWUP:[Routine],ESTABLISHEDPATIENT:[T]],PROVIDER:[TOKEN:[[id number]],FOLLOWUP:[1 week],ESTABLISHEDPATIENT:[T]],PROVIDER:[TOKEN:[[id number]],FOLLOWUP:[Routine],ESTABLISHEDPATIENT:[T]]

## 2023-07-04 NOTE — PROGRESS NOTE ADULT - PROBLEM SELECTOR PLAN 5
no home anti-htn meds  -BP acceptable range  -regular diet (patient does not want restriction)
no home anti-htn meds  -BP acceptable range  -regular diet (patient does not want restriction)
hx of turp 2022  -bladder scan for urinary retention  -Has close follow up with uro Mccormack 7/6

## 2023-07-04 NOTE — DISCHARGE NOTE NURSING/CASE MANAGEMENT/SOCIAL WORK - PATIENT PORTAL LINK FT
You can access the FollowMyHealth Patient Portal offered by [facility] by registering at the following website: [email or web address]. By joining [facility]’s FollowMyHealth portal, you will also be able to view your health information using other applications (apps) compatible with our system.

## 2023-07-04 NOTE — DISCHARGE NOTE PROVIDER - NSDCFUSCHEDAPPT_GEN_ALL_CORE_FT
Antoni Mccormack  [facility]  UROLOGY [address]  Scheduled Appointment: 07/06/2023    Anastasiya Hargrove  [facility]  INTMED [id number]  Scheduled Appointment: 08/03/2023    Hola Spencer  [facility]  CARDIOLOGY [id number]  Scheduled Appointment: 09/09/2023

## 2023-07-05 RX ORDER — AMOXICILLIN 250 MG/5ML
1 SUSPENSION, RECONSTITUTED, ORAL (ML) ORAL
Qty: 10 | Refills: 0
Start: 2023-07-05 | End: 2023-07-09

## 2023-07-06 ENCOUNTER — APPOINTMENT (OUTPATIENT)
Dept: UROLOGY | Facility: CLINIC | Age: 68
End: 2023-07-06
Payer: MEDICARE

## 2023-07-06 DIAGNOSIS — N40.1 BENIGN PROSTATIC HYPERPLASIA WITH LOWER URINARY TRACT SYMPMS: ICD-10-CM

## 2023-07-06 DIAGNOSIS — N39.0 URINARY TRACT INFECTION, SITE NOT SPECIFIED: ICD-10-CM

## 2023-07-06 DIAGNOSIS — N13.8 BENIGN PROSTATIC HYPERPLASIA WITH LOWER URINARY TRACT SYMPMS: ICD-10-CM

## 2023-07-06 PROCEDURE — 99214 OFFICE O/P EST MOD 30 MIN: CPT | Mod: 25

## 2023-07-06 PROCEDURE — 51798 US URINE CAPACITY MEASURE: CPT

## 2023-07-06 RX ORDER — AMOXICILLIN AND CLAVULANATE POTASSIUM 875; 125 MG/1; MG/1
875-125 TABLET, COATED ORAL
Refills: 0 | Status: COMPLETED | COMMUNITY
End: 2023-07-06

## 2023-07-06 RX ORDER — AMOXICILLIN AND CLAVULANATE POTASSIUM 875; 125 MG/1; MG/1
875-125 TABLET, COATED ORAL
Qty: 10 | Refills: 0 | Status: COMPLETED | COMMUNITY
Start: 2023-05-02 | End: 2023-07-06

## 2023-07-06 NOTE — ASSESSMENT
[FreeTextEntry1] : Postvoid residual bladder scan today shows that he is emptying well with 0 mL residual.  We discussed his recent symptoms of retention and I suspect that he may have developed this related to the urinary tract infection noted in the emergency department as well as being constipated which was seen on his CT scan in the emergency room.  The CT scan has also shown the presence of bilateral nonobstructing kidney stones measuring up to about 8 mm in size.  I do not think these findings have anything to do with his recent urinary symptoms but I did advise him that I would suggest he may want to consider intervention to remove the stones as some of them are large enough that they may not pass spontaneously.  I have referred him to one of my partners with expertise in stone surgery for further discussion of stone management options.\par \par The creatinine level will be rechecked today as part of the follow-up post recent hospitalization and creatinine elevation.  He understands also that he has been recommended to see a nephrologist by myself as well as the physician who discharged him from the hospital.  I provided him with the name of a colleague in nephrology so that he can have someone to reach out to you to set up follow-up.\par \par As far as the prostate, he has been urinating well up until this recent infection post prior transurethral resection of the prostate.  I would recommend that he go back on tamsulosin to help minimize the risk of any issues of additional retention in the future.  I will plan to see him back in a few months to reassess how he is doing.

## 2023-07-06 NOTE — HISTORY OF PRESENT ILLNESS
[FreeTextEntry1] : Drake Reed returns to the office today.  He is a 68-year-old man with history of BPH and bladder outlet obstruction.  He underwent a transurethral resection of his prostate in April of last year.  Pathology from the surgery had shown all benign tissue.  He has noted good improvement in his urinary flow and he feels empty upon completion.  He has noted some postvoid dribbling. \par \par He has been treated for UTIs in the past, most recent one was in April. The culture showed enterococcus and he was treated with Augmentin. He was asymptomatic at this time. \par \par His PSA was last checked in April and was 1.14ng/mL. \par \par \par June 30 th went to ER for abdominal pain, at the time he did feel he could not void. Did not require catherization as he started to urinate on his own after he was in the emergency department.\par Creatinine 3.37, on admission. Enterococcus UTI, discharged home on Amoxicillin and tamsulosin

## 2023-07-08 LAB
ANION GAP SERPL CALC-SCNC: 12 MMOL/L
APPEARANCE: CLEAR
BACTERIA UR CULT: NORMAL
BACTERIA: NEGATIVE /HPF
BILIRUBIN URINE: NEGATIVE
BLOOD URINE: NEGATIVE
BUN SERPL-MCNC: 30 MG/DL
CALCIUM SERPL-MCNC: 9.2 MG/DL
CAST: 2 /LPF
CHLORIDE SERPL-SCNC: 105 MMOL/L
CO2 SERPL-SCNC: 26 MMOL/L
COLOR: YELLOW
CREAT SERPL-MCNC: 2.72 MG/DL
EGFR: 25 ML/MIN/1.73M2
EPITHELIAL CELLS: 0 /HPF
GLUCOSE QUALITATIVE U: NEGATIVE MG/DL
GLUCOSE SERPL-MCNC: 81 MG/DL
KETONES URINE: NEGATIVE MG/DL
LEUKOCYTE ESTERASE URINE: ABNORMAL
MICROSCOPIC-UA: NORMAL
NITRITE URINE: NEGATIVE
PH URINE: 6
POTASSIUM SERPL-SCNC: 4.1 MMOL/L
PROTEIN URINE: NEGATIVE MG/DL
RED BLOOD CELLS URINE: 0 /HPF
SODIUM SERPL-SCNC: 143 MMOL/L
SPECIFIC GRAVITY URINE: 1.01
UROBILINOGEN URINE: 0.2 MG/DL
WHITE BLOOD CELLS URINE: 3 /HPF

## 2023-07-14 ENCOUNTER — LABORATORY RESULT (OUTPATIENT)
Age: 68
End: 2023-07-14

## 2023-07-14 ENCOUNTER — APPOINTMENT (OUTPATIENT)
Dept: NEPHROLOGY | Facility: CLINIC | Age: 68
End: 2023-07-14
Payer: MEDICARE

## 2023-07-14 VITALS
WEIGHT: 134 LBS | BODY MASS INDEX: 21.03 KG/M2 | HEART RATE: 65 BPM | OXYGEN SATURATION: 99 % | HEIGHT: 67 IN | SYSTOLIC BLOOD PRESSURE: 124 MMHG | DIASTOLIC BLOOD PRESSURE: 67 MMHG | TEMPERATURE: 97.7 F

## 2023-07-14 PROCEDURE — 99205 OFFICE O/P NEW HI 60 MIN: CPT

## 2023-07-14 RX ORDER — PHENOL 1.4 %
250 AEROSOL, SPRAY (ML) MUCOUS MEMBRANE
Refills: 0 | Status: DISCONTINUED | COMMUNITY
End: 2023-07-14

## 2023-07-14 RX ORDER — MULTIVIT-MIN/IRON/FOLIC ACID/K 18-600-40
500 CAPSULE ORAL
Refills: 0 | Status: DISCONTINUED | COMMUNITY
End: 2023-07-14

## 2023-07-14 RX ORDER — CHLORHEXIDINE GLUCONATE 4 %
400 LIQUID (ML) TOPICAL
Refills: 0 | Status: DISCONTINUED | COMMUNITY
End: 2023-07-14

## 2023-07-14 RX ORDER — OMEGA-3/DHA/EPA/FISH OIL 1200 MG
1200 CAPSULE ORAL
Refills: 0 | Status: DISCONTINUED | COMMUNITY
End: 2023-07-14

## 2023-07-14 RX ORDER — MULTIVITAMIN
TABLET ORAL
Refills: 0 | Status: DISCONTINUED | COMMUNITY
End: 2023-07-14

## 2023-07-14 NOTE — ASSESSMENT
[FreeTextEntry1] : Acute Kidney Injury \par \par NICOLETTE was thought to be secondary to ATN in the setting of infection. there was no evidence of obstruction. urinalysis does not show any proteinuria or hematuria. \par \par - check SPEP, Free light chains \par - Stone work up with Dr. Powers \par - urinalysis with microscopy and urine protein/creatinine ratio \par - change Nexium to Pepcid

## 2023-07-14 NOTE — HISTORY OF PRESENT ILLNESS
[FreeTextEntry1] : referred for acute kidney injury \par \par 68-year-old man with history of BPH and bladder outlet obstruction. s/p TURP last year. June 30 - he was hospitalized at [facility] for abdominal pain and apparently unable to urinate. He was found to have a UTI and a Creatinine 3.37. Urine culture-  Enterococcus UTI, discharged home on Amoxicillin and tamsulosin \par

## 2023-07-14 NOTE — PHYSICAL EXAM
[General Appearance - Alert] : alert [General Appearance - In No Acute Distress] : in no acute distress [General Appearance - Well Nourished] : well nourished [General Appearance - Well Developed] : well developed [General Appearance - Well-Appearing] : healthy appearing [Sclera] : the sclera and conjunctiva were normal [Outer Ear] : the ears and nose were normal in appearance [Hearing Threshold Finger Rub Not [place]] : hearing was normal [Examination Of The Oral Cavity] : the lips and gums were normal [Neck Appearance] : the appearance of the neck was normal [Neck Cervical Mass (___cm)] : no neck mass was observed [Jugular Venous Distention Increased] : there was no jugular-venous distention [] : no respiratory distress [Respiration, Rhythm And Depth] : normal respiratory rhythm and effort [Exaggerated Use Of Accessory Muscles For Inspiration] : no accessory muscle use [Auscultation Breath Sounds / Voice Sounds] : lungs were clear to auscultation bilaterally [Heart Rate And Rhythm] : heart rate was normal and rhythm regular [Heart Sounds] : normal S1 and S2 [Heart Sounds Gallop] : no gallops [Edema] : there was no peripheral edema [Veins - Varicosity Changes] : there were no varicosital changes [Bowel Sounds] : normal bowel sounds [Abdomen Soft] : soft [Abdomen Tenderness] : non-tender [Abnormal Walk] : normal gait [Nail Clubbing] : no clubbing  or cyanosis of the fingernails [Musculoskeletal - Swelling] : no joint swelling seen [Skin Color & Pigmentation] : normal skin color and pigmentation [Skin Turgor] : normal skin turgor [Oriented To Time, Place, And Person] : oriented to person, place, and time [Impaired Insight] : insight and judgment were intact

## 2023-07-28 NOTE — ASU PREOP CHECKLIST - ADVANCE DIRECTIVE ADDRESSED/READDRESSED
01/02/20 1315   Dual Skin Pressure Injury Assessment   Dual Skin Pressure Injury Assessment WDL   Second Care Provider (Based on [address] [place] [address]) Jory Eisenmenger, RN   Skin Integumentary   Skin Integumentary (WDL) X   Skin Color Appropriate for ethnicity   Skin Condition/Temp Warm;Dry;Fragile   Skin Integrity Laceration (comment)  (R eye)   Turgor Epidermis thin w/ loss of subcut tissue   Hair Growth Sparce   Nails WDL    Pressure  Injury Documentation No Pressure Injury Noted-Pressure Ulcer Prevention Initiated   Varicosities Absent   Wound Prevention and Protection Methods   Orientation of Wound Prevention Posterior   Location of Wound Prevention Sacrum/Coccyx   Dressing Present  Yes; Intact, not due to be changed   Dressing Status Intact   Wound Offloading (Prevention Methods) Bed, pressure reduction mattress;Pillows;Repositioning PT order was faxed to number provided done

## 2023-08-03 ENCOUNTER — APPOINTMENT (OUTPATIENT)
Dept: INTERNAL MEDICINE | Facility: CLINIC | Age: 68
End: 2023-08-03

## 2023-09-01 ENCOUNTER — APPOINTMENT (OUTPATIENT)
Dept: NEPHROLOGY | Facility: CLINIC | Age: 68
End: 2023-09-01
Payer: MEDICARE

## 2023-09-01 VITALS
TEMPERATURE: 97.9 F | WEIGHT: 123.46 LBS | DIASTOLIC BLOOD PRESSURE: 78 MMHG | OXYGEN SATURATION: 98 % | HEART RATE: 75 BPM | SYSTOLIC BLOOD PRESSURE: 129 MMHG | BODY MASS INDEX: 19.38 KG/M2 | HEIGHT: 67 IN

## 2023-09-01 LAB
ALBUMIN MFR SERPL ELPH: 57.4 %
ALBUMIN SERPL-MCNC: 3.8 G/DL
ALBUMIN/GLOB SERPL: 1.3 RATIO
ALPHA1 GLOB MFR SERPL ELPH: 5.3 %
ALPHA1 GLOB SERPL ELPH-MCNC: 0.4 G/DL
ALPHA2 GLOB MFR SERPL ELPH: 8.7 %
ALPHA2 GLOB SERPL ELPH-MCNC: 0.6 G/DL
ANION GAP SERPL CALC-SCNC: 14 MMOL/L
APPEARANCE: CLEAR
B-GLOBULIN MFR SERPL ELPH: 12.4 %
B-GLOBULIN SERPL ELPH-MCNC: 0.8 G/DL
BACTERIA: NEGATIVE /HPF
BILIRUBIN URINE: NEGATIVE
BLOOD URINE: NEGATIVE
BUN SERPL-MCNC: 33 MG/DL
CALCIUM SERPL-MCNC: 9 MG/DL
CAST: 0 /LPF
CHLORIDE SERPL-SCNC: 104 MMOL/L
CO2 SERPL-SCNC: 27 MMOL/L
COLOR: YELLOW
CREAT SERPL-MCNC: 2.46 MG/DL
CREAT SPEC-SCNC: 44 MG/DL
CREAT/PROT UR: 0.3 RATIO
DEPRECATED KAPPA LC FREE/LAMBDA SER: 1.81 RATIO
EGFR: 28 ML/MIN/1.73M2
EPITHELIAL CELLS: 0 /HPF
GAMMA GLOB FLD ELPH-MCNC: 1.1 G/DL
GAMMA GLOB MFR SERPL ELPH: 16.2 %
GLUCOSE QUALITATIVE U: NEGATIVE MG/DL
GLUCOSE SERPL-MCNC: 87 MG/DL
INTERPRETATION SERPL IEP-IMP: NORMAL
KAPPA LC CSF-MCNC: 4.05 MG/DL
KAPPA LC SERPL-MCNC: 7.35 MG/DL
KETONES URINE: NEGATIVE MG/DL
LEUKOCYTE ESTERASE URINE: NEGATIVE
MICROSCOPIC-UA: NORMAL
NITRITE URINE: NEGATIVE
PH URINE: 6.5
POTASSIUM SERPL-SCNC: 4.3 MMOL/L
PROT SERPL-MCNC: 6.7 G/DL
PROT SERPL-MCNC: 6.7 G/DL
PROT UR-MCNC: 12 MG/DL
PROTEIN URINE: NORMAL MG/DL
RED BLOOD CELLS URINE: 1 /HPF
SODIUM SERPL-SCNC: 145 MMOL/L
SPECIFIC GRAVITY URINE: 1.01
UROBILINOGEN URINE: 0.2 MG/DL
WHITE BLOOD CELLS URINE: 1 /HPF

## 2023-09-01 PROCEDURE — 99214 OFFICE O/P EST MOD 30 MIN: CPT

## 2023-09-01 RX ORDER — ESOMEPRAZOLE MAGNESIUM 40 MG/1
40 CAPSULE, DELAYED RELEASE ORAL
Qty: 30 | Refills: 4 | Status: DISCONTINUED | COMMUNITY
Start: 2023-06-23 | End: 2023-09-01

## 2023-09-01 RX ORDER — FAMOTIDINE 20 MG/1
20 TABLET, FILM COATED ORAL TWICE DAILY
Qty: 60 | Refills: 3 | Status: DISCONTINUED | COMMUNITY
Start: 2023-07-14 | End: 2023-09-01

## 2023-09-01 RX ORDER — TAMSULOSIN HYDROCHLORIDE 0.4 MG/1
0.4 CAPSULE ORAL
Qty: 90 | Refills: 3 | Status: DISCONTINUED | COMMUNITY
End: 2023-09-01

## 2023-09-01 NOTE — PHYSICAL EXAM
[General Appearance - Alert] : alert [General Appearance - In No Acute Distress] : in no acute distress [General Appearance - Well Nourished] : well nourished [General Appearance - Well Developed] : well developed [General Appearance - Well-Appearing] : healthy appearing [Sclera] : the sclera and conjunctiva were normal [Outer Ear] : the ears and nose were normal in appearance [Hearing Threshold Finger Rub Not [place]] : hearing was normal [Examination Of The Oral Cavity] : the lips and gums were normal [Neck Appearance] : the appearance of the neck was normal [Neck Cervical Mass (___cm)] : no neck mass was observed [Jugular Venous Distention Increased] : there was no jugular-venous distention [] : no respiratory distress [Respiration, Rhythm And Depth] : normal respiratory rhythm and effort [Exaggerated Use Of Accessory Muscles For Inspiration] : no accessory muscle use [Auscultation Breath Sounds / Voice Sounds] : lungs were clear to auscultation bilaterally [Heart Rate And Rhythm] : heart rate was normal and rhythm regular [Heart Sounds] : normal S1 and S2 [Heart Sounds Gallop] : no gallops [Edema] : there was no peripheral edema [Veins - Varicosity Changes] : there were no varicosital changes [Bowel Sounds] : normal bowel sounds [Abdomen Soft] : soft [Abdomen Tenderness] : non-tender [Abnormal Walk] : normal gait [Nail Clubbing] : no clubbing  or cyanosis of the fingernails [Musculoskeletal - Swelling] : no joint swelling seen [Skin Color & Pigmentation] : normal skin color and pigmentation [Skin Turgor] : normal skin turgor [Oriented To Time, Place, And Person] : oriented to person, place, and time [Impaired Insight] : insight and judgment were intact

## 2023-09-01 NOTE — HISTORY OF PRESENT ILLNESS
[FreeTextEntry1] : Follow up acute kidney injury/ chronic kidney disease   68-year-old man with history of BPH and bladder outlet obstruction. s/p TURP last year. June 30 - he was hospitalized at [facility] for abdominal pain and apparently unable to urinate. He was found to have a UTI and a Creatinine 3.37. Urine culture-  Enterococcus UTI, discharged home on Amoxicillin and tamsulosin   repeat labs showed a creatinine of 2.4 in July 2023   since last visit   he is getting UGI endoscopy in a few weeks  feels well except some nausea  he stopped taking tamsulosin as he felt it was not helping him  he is also not taking any medications for acid suppression cos he felt they were not helping him

## 2023-09-01 NOTE — ASSESSMENT
[FreeTextEntry1] : Acute Kidney Injury   NICOLETTE was thought to be secondary to ATN in the setting of infection. there was no evidence of obstruction. urinalysis does not show any proteinuria or hematuria. Serologic work up was negative. Kidney function was improving.   Check BMP, Urinalysis again today  advised to go back on Tamsulosin ( he has not been taking it for a few months)

## 2023-09-06 ENCOUNTER — RESULT REVIEW (OUTPATIENT)
Age: 68
End: 2023-09-06

## 2023-09-07 ENCOUNTER — APPOINTMENT (OUTPATIENT)
Dept: GASTROENTEROLOGY | Facility: AMBULATORY SURGERY CENTER | Age: 68
End: 2023-09-07
Payer: MEDICARE

## 2023-09-07 ENCOUNTER — TRANSCRIPTION ENCOUNTER (OUTPATIENT)
Age: 68
End: 2023-09-07

## 2023-09-07 PROCEDURE — 43239 EGD BIOPSY SINGLE/MULTIPLE: CPT

## 2023-09-08 ENCOUNTER — TRANSCRIPTION ENCOUNTER (OUTPATIENT)
Age: 68
End: 2023-09-08

## 2023-09-09 ENCOUNTER — NON-APPOINTMENT (OUTPATIENT)
Age: 68
End: 2023-09-09

## 2023-09-09 ENCOUNTER — APPOINTMENT (OUTPATIENT)
Dept: CARDIOLOGY | Facility: CLINIC | Age: 68
End: 2023-09-09
Payer: MEDICARE

## 2023-09-09 VITALS
WEIGHT: 119 LBS | OXYGEN SATURATION: 98 % | HEART RATE: 68 BPM | DIASTOLIC BLOOD PRESSURE: 54 MMHG | SYSTOLIC BLOOD PRESSURE: 112 MMHG | HEIGHT: 67 IN | RESPIRATION RATE: 12 BRPM | BODY MASS INDEX: 18.68 KG/M2

## 2023-09-09 DIAGNOSIS — I10 ESSENTIAL (PRIMARY) HYPERTENSION: ICD-10-CM

## 2023-09-09 PROCEDURE — 99213 OFFICE O/P EST LOW 20 MIN: CPT | Mod: 25

## 2023-09-09 PROCEDURE — 93000 ELECTROCARDIOGRAM COMPLETE: CPT

## 2023-09-09 NOTE — CARDIOLOGY SUMMARY
[___] : [unfilled] [LVEF ___%] : LVEF [unfilled]% [None] : no pulmonary hypertension [de-identified] : 9/9/2023: Sinus Rhythm at 64 beats per minute.

## 2023-09-09 NOTE — PHYSICAL EXAM
[General Appearance - Well Developed] : well developed [Normal Appearance] : normal appearance [Well Groomed] : well groomed [General Appearance - Well Nourished] : well nourished [No Deformities] : no deformities [General Appearance - In No Acute Distress] : no acute distress [Normal Conjunctiva] : the conjunctiva exhibited no abnormalities [Normal Jugular Venous A Waves Present] : normal jugular venous A waves present [Normal Jugular Venous V Waves Present] : normal jugular venous V waves present [Respiration, Rhythm And Depth] : normal respiratory rhythm and effort [Exaggerated Use Of Accessory Muscles For Inspiration] : no accessory muscle use [Auscultation Breath Sounds / Voice Sounds] : lungs were clear to auscultation bilaterally [Bowel Sounds] : normal bowel sounds [Abdomen Soft] : soft [Abdomen Tenderness] : non-tender [Abnormal Walk] : normal gait [Gait - Sufficient For Exercise Testing] : the gait was sufficient for exercise testing [Nail Clubbing] : no clubbing of the fingernails [Cyanosis, Localized] : no localized cyanosis [Petechial Hemorrhages (___cm)] : no petechial hemorrhages [Skin Color & Pigmentation] : normal skin color and pigmentation [] : no rash [No Skin Ulcers] : no skin ulcer [Oriented To Time, Place, And Person] : oriented to person, place, and time [Affect] : the affect was normal [Mood] : the mood was normal [No Anxiety] : not feeling anxious [Normal Rate] : normal [Rhythm Regular] : regular [Normal S1] : normal S1 [Normal S2] : normal S2 [I] : a grade 1 [No Pitting Edema] : no pitting edema present [FreeTextEntry1] : Extraocular muscles intact. Anicteric sclerae. [Normal Oral Mucosa] : normal oral mucosa [No Oral Pallor] : no oral pallor [No Oral Cyanosis] : no oral cyanosis [S3] : no S3 [Right Carotid Bruit] : no bruit heard over the right carotid [Left Carotid Bruit] : no bruit heard over the left carotid [Bruit] : no bruit heard

## 2023-09-09 NOTE — DISCUSSION/SUMMARY
[FreeTextEntry1] : IMPRESSION: Mr. SHI is a 68 year-old man with a history of hypertension, family history of coronary artery disease, recently diagnosed renal insufficiency, and history of rheumatic fever who presents today for follow up of his blood pressure.   PLAN: 1. His blood pressure is well controlled today, His heart rate was normal on his ECG. He will continue off of medications. He will schedule an echocardiogram given his HTN. 2. He will follow up with me in 1 year or sooner should he experience any symptoms in the interim.  [EKG obtained to assist in diagnosis and management of assessed problem(s)] : EKG obtained to assist in diagnosis and management of assessed problem(s)

## 2023-09-09 NOTE — HISTORY OF PRESENT ILLNESS
[FreeTextEntry1] : Patient is a 68-year-old man with a history of hypertension, family history of coronary artery disease, and history of rheumatic fever who presents today for follow up of blood pressure. I have not seen him in over a year and a half. He recently developed acute renal insufficiency felt to be secondary to a UTI, however, his creatinine has not normalized. He feels nauseous at time and recently had an ECG, but otherwise denies any chest pain, dyspnea, palpitations, headaches, or dizziness.

## 2023-09-13 ENCOUNTER — TRANSCRIPTION ENCOUNTER (OUTPATIENT)
Age: 68
End: 2023-09-13

## 2023-09-15 LAB
25(OH)D3 SERPL-MCNC: 69 NG/ML
ALBUMIN SERPL ELPH-MCNC: 4.4 G/DL
ANION GAP SERPL CALC-SCNC: 13 MMOL/L
APPEARANCE: ABNORMAL
BACTERIA: ABNORMAL /HPF
BILIRUBIN URINE: NEGATIVE
BLOOD URINE: ABNORMAL
BUN SERPL-MCNC: 41 MG/DL
CALCIUM SERPL-MCNC: 9.4 MG/DL
CALCIUM SERPL-MCNC: 9.4 MG/DL
CAST: 1 /LPF
CHLORIDE SERPL-SCNC: 99 MMOL/L
CO2 SERPL-SCNC: 28 MMOL/L
COLOR: YELLOW
CREAT SERPL-MCNC: 3.19 MG/DL
CREAT SPEC-SCNC: 38 MG/DL
CREAT/PROT UR: 2.1 RATIO
EGFR: 20 ML/MIN/1.73M2
EPITHELIAL CELLS: 2 /HPF
FERRITIN SERPL-MCNC: 137 NG/ML
GLUCOSE QUALITATIVE U: NEGATIVE MG/DL
GLUCOSE SERPL-MCNC: 83 MG/DL
IRON SATN MFR SERPL: 22 %
IRON SERPL-MCNC: 54 UG/DL
KETONES URINE: NEGATIVE MG/DL
LEUKOCYTE ESTERASE URINE: ABNORMAL
MICROSCOPIC-UA: NORMAL
NITRITE URINE: NEGATIVE
PARATHYROID HORMONE INTACT: 119 PG/ML
PH URINE: 7.5
PHOSPHATE SERPL-MCNC: 4.5 MG/DL
POTASSIUM SERPL-SCNC: 5.1 MMOL/L
PROT UR-MCNC: 80 MG/DL
PROTEIN URINE: 100 MG/DL
RED BLOOD CELLS URINE: 244 /HPF
SODIUM SERPL-SCNC: 139 MMOL/L
SPECIFIC GRAVITY URINE: 1.01
TIBC SERPL-MCNC: 241 UG/DL
UIBC SERPL-MCNC: 187 UG/DL
UROBILINOGEN URINE: 0.2 MG/DL
WHITE BLOOD CELLS URINE: 56 /HPF

## 2023-09-21 ENCOUNTER — APPOINTMENT (OUTPATIENT)
Dept: CARDIOLOGY | Facility: CLINIC | Age: 68
End: 2023-09-21
Payer: MEDICARE

## 2023-09-21 PROCEDURE — 93306 TTE W/DOPPLER COMPLETE: CPT

## 2023-10-27 NOTE — PROGRESS NOTE ADULT - PROBLEM/PLAN-2
Department of Anesthesiology  Postprocedure Note    Patient: Manuel Bernal  MRN: [id number]  YOB: 1960  Date of evaluation: 10/27/2023      Procedure Summary     Date: 10/27/23 Room / Location: [facility] ENDO 03 / [facility] ENDOSCOPY    Anesthesia Start: 0852 Anesthesia Stop: Luke Larve    Procedure: COLONOSCOPY with  polypectomy Diagnosis:       Elevated fecal calprotectin      (Elevated fecal calprotectin [R19.5])    Surgeons: Valente Gupta MD Responsible Provider: Corie Henderson MD    Anesthesia Type: MAC ASA Status: 3          Anesthesia Type: MAC    Meredith Phase I: Meredith Score: 10    Meredith Phase II: Meredith Score: 10      Anesthesia Post Evaluation    Patient location during evaluation: PACU  Patient participation: complete - patient participated  Level of consciousness: sleepy but conscious  Pain score: 0  Airway patency: patent  Nausea & Vomiting: no nausea and no vomiting  Complications: no  Cardiovascular status: blood pressure returned to baseline  Respiratory status: acceptable  Hydration status: euvolemic  Pain management: adequate
DISPLAY PLAN FREE TEXT

## 2024-01-05 ENCOUNTER — APPOINTMENT (OUTPATIENT)
Dept: NEPHROLOGY | Facility: CLINIC | Age: 69
End: 2024-01-05
Payer: MEDICARE

## 2024-01-05 VITALS
SYSTOLIC BLOOD PRESSURE: 116 MMHG | WEIGHT: 127.87 LBS | DIASTOLIC BLOOD PRESSURE: 68 MMHG | BODY MASS INDEX: 21.3 KG/M2 | HEART RATE: 69 BPM | TEMPERATURE: 97.7 F | HEIGHT: 65 IN | OXYGEN SATURATION: 100 %

## 2024-01-05 DIAGNOSIS — N18.32 CHRONIC KIDNEY DISEASE, STAGE 3B: ICD-10-CM

## 2024-01-05 DIAGNOSIS — N17.9 ACUTE KIDNEY FAILURE, UNSPECIFIED: ICD-10-CM

## 2024-01-05 DIAGNOSIS — N18.30 CHRONIC KIDNEY DISEASE, STAGE 3 UNSPECIFIED: ICD-10-CM

## 2024-01-05 LAB
ALBUMIN SERPL ELPH-MCNC: 4.4 G/DL
ALP BLD-CCNC: 71 U/L
ALT SERPL-CCNC: 23 U/L
ANION GAP SERPL CALC-SCNC: 14 MMOL/L
APPEARANCE: CLEAR
AST SERPL-CCNC: 26 U/L
BACTERIA: NEGATIVE /HPF
BILIRUB SERPL-MCNC: <0.2 MG/DL
BILIRUBIN URINE: NEGATIVE
BLOOD URINE: ABNORMAL
BUN SERPL-MCNC: 42 MG/DL
CALCIUM SERPL-MCNC: 8.6 MG/DL
CAST: 0 /LPF
CHLORIDE SERPL-SCNC: 103 MMOL/L
CO2 SERPL-SCNC: 23 MMOL/L
COLOR: YELLOW
CREAT SERPL-MCNC: 2.71 MG/DL
CREAT SPEC-SCNC: 51 MG/DL
CREAT/PROT UR: 0.5 RATIO
EGFR: 25 ML/MIN/1.73M2
EPITHELIAL CELLS: 1 /HPF
GLUCOSE QUALITATIVE U: NEGATIVE MG/DL
GLUCOSE SERPL-MCNC: 96 MG/DL
KETONES URINE: NEGATIVE MG/DL
LEUKOCYTE ESTERASE URINE: ABNORMAL
MICROSCOPIC-UA: NORMAL
NITRITE URINE: NEGATIVE
PH URINE: 6.5
POTASSIUM SERPL-SCNC: 4.8 MMOL/L
PROT SERPL-MCNC: 7 G/DL
PROT UR-MCNC: 25 MG/DL
PROTEIN URINE: 30 MG/DL
RED BLOOD CELLS URINE: 1 /HPF
SODIUM SERPL-SCNC: 141 MMOL/L
SPECIFIC GRAVITY URINE: 1.01
UROBILINOGEN URINE: 0.2 MG/DL
WHITE BLOOD CELLS URINE: 35 /HPF

## 2024-01-05 PROCEDURE — 99214 OFFICE O/P EST MOD 30 MIN: CPT

## 2024-01-05 NOTE — PHYSICAL EXAM
[General Appearance - Alert] : alert [General Appearance - In No Acute Distress] : in no acute distress [General Appearance - Well Nourished] : well nourished [General Appearance - Well Developed] : well developed [General Appearance - Well-Appearing] : healthy appearing [Sclera] : the sclera and conjunctiva were normal [Outer Ear] : the ears and nose were normal in appearance [Hearing Threshold Finger Rub Not [place]] : hearing was normal [Examination Of The Oral Cavity] : the lips and gums were normal [Neck Appearance] : the appearance of the neck was normal [Neck Cervical Mass (___cm)] : no neck mass was observed [Jugular Venous Distention Increased] : there was no jugular-venous distention [] : no respiratory distress [Respiration, Rhythm And Depth] : normal respiratory rhythm and effort [Exaggerated Use Of Accessory Muscles For Inspiration] : no accessory muscle use [Auscultation Breath Sounds / Voice Sounds] : lungs were clear to auscultation bilaterally [Heart Rate And Rhythm] : heart rate was normal and rhythm regular [Heart Sounds] : normal S1 and S2 [Heart Sounds Gallop] : no gallops [Edema] : there was no peripheral edema [Veins - Varicosity Changes] : there were no varicosital changes [Bowel Sounds] : normal bowel sounds [Abdomen Soft] : soft [Abdomen Tenderness] : non-tender [Abnormal Walk] : normal gait [Nail Clubbing] : no clubbing  or cyanosis of the fingernails [Musculoskeletal - Swelling] : no joint swelling seen [Skin Color & Pigmentation] : normal skin color and pigmentation [Skin Turgor] : normal skin turgor [Oriented To Time, Place, And Person] : oriented to person, place, and time [Impaired Insight] : insight and judgment were intact

## 2024-01-05 NOTE — ASSESSMENT
[FreeTextEntry1] : NICOLETTE was thought to be secondary to ATN in the setting of infection. there was no evidence of obstruction. urinalysis does not show any proteinuria or hematuria. Serologic work up was negative. Kidney function was improving.  Check BMP, Urinalysis. he will go to a lab thats close to him  advised to go back on Tamsulosin again ( he has not been taking it for a few months).

## 2024-01-05 NOTE — HISTORY OF PRESENT ILLNESS
[FreeTextEntry1] : Follow up acute kidney injury/ chronic kidney disease   68-year-old man with history of BPH and bladder outlet obstruction. s/p TURP last year. June 30 - he was hospitalized at [facility] for abdominal pain and apparently unable to urinate. He was found to have a UTI and a Creatinine 3.37. Urine culture-  Enterococcus UTI, discharged home on Amoxicillin and tamsulosin   repeat labs showed a creatinine of 2.4 in July 2023. in september - creat was 3.1. it was unsure whether he was having any retention.   no history of diabetes or hypertension   ROS   - No changes in urination, no blood in urine, no foamy urine  CVS- No chest pain, no shortness of breath GI - no nausea/ vomiting, no changes in appetite  all other systems reviewed in detail and were negative except as above

## 2024-01-13 ENCOUNTER — EMERGENCY (EMERGENCY)
Facility: HOSPITAL | Age: 69
LOS: 1 days | Discharge: HOME | End: 2024-01-13
Attending: EMERGENCY MEDICINE
Payer: MEDICARE

## 2024-01-13 VITALS
HEIGHT: 65 IN | DIASTOLIC BLOOD PRESSURE: 82 MMHG | TEMPERATURE: 98 F | SYSTOLIC BLOOD PRESSURE: 152 MMHG | RESPIRATION RATE: 18 BRPM | HEART RATE: 87 BPM | OXYGEN SATURATION: 100 % | WEIGHT: 130.07 LBS

## 2024-01-13 DIAGNOSIS — Z98.890 OTHER SPECIFIED POSTPROCEDURAL STATES: Chronic | ICD-10-CM

## 2024-01-13 DIAGNOSIS — Z90.89 ACQUIRED ABSENCE OF OTHER ORGANS: Chronic | ICD-10-CM

## 2024-01-13 LAB
ALBUMIN SERPL ELPH-MCNC: 4 G/DL — SIGNIFICANT CHANGE UP (ref 3.3–5)
ALBUMIN SERPL ELPH-MCNC: 4 G/DL — SIGNIFICANT CHANGE UP (ref 3.3–5)
ALP SERPL-CCNC: 81 U/L — SIGNIFICANT CHANGE UP (ref 40–120)
ALP SERPL-CCNC: 81 U/L — SIGNIFICANT CHANGE UP (ref 40–120)
ALT FLD-CCNC: 17 U/L — SIGNIFICANT CHANGE UP (ref 10–45)
ALT FLD-CCNC: 17 U/L — SIGNIFICANT CHANGE UP (ref 10–45)
ANION GAP SERPL CALC-SCNC: 13 MMOL/L — SIGNIFICANT CHANGE UP (ref 5–17)
ANION GAP SERPL CALC-SCNC: 13 MMOL/L — SIGNIFICANT CHANGE UP (ref 5–17)
APTT BLD: 27.8 SEC — SIGNIFICANT CHANGE UP (ref 24.5–35.6)
APTT BLD: 27.8 SEC — SIGNIFICANT CHANGE UP (ref 24.5–35.6)
AST SERPL-CCNC: 27 U/L — SIGNIFICANT CHANGE UP (ref 10–40)
AST SERPL-CCNC: 27 U/L — SIGNIFICANT CHANGE UP (ref 10–40)
BASOPHILS # BLD AUTO: 0.05 K/UL — SIGNIFICANT CHANGE UP (ref 0–0.2)
BASOPHILS # BLD AUTO: 0.05 K/UL — SIGNIFICANT CHANGE UP (ref 0–0.2)
BASOPHILS NFR BLD AUTO: 0.6 % — SIGNIFICANT CHANGE UP (ref 0–2)
BASOPHILS NFR BLD AUTO: 0.6 % — SIGNIFICANT CHANGE UP (ref 0–2)
BILIRUB SERPL-MCNC: 0.2 MG/DL — SIGNIFICANT CHANGE UP (ref 0.2–1.2)
BILIRUB SERPL-MCNC: 0.2 MG/DL — SIGNIFICANT CHANGE UP (ref 0.2–1.2)
BUN SERPL-MCNC: 37 MG/DL — HIGH (ref 7–23)
BUN SERPL-MCNC: 37 MG/DL — HIGH (ref 7–23)
CALCIUM SERPL-MCNC: 8.8 MG/DL — SIGNIFICANT CHANGE UP (ref 8.4–10.5)
CALCIUM SERPL-MCNC: 8.8 MG/DL — SIGNIFICANT CHANGE UP (ref 8.4–10.5)
CHLORIDE SERPL-SCNC: 103 MMOL/L — SIGNIFICANT CHANGE UP (ref 96–108)
CHLORIDE SERPL-SCNC: 103 MMOL/L — SIGNIFICANT CHANGE UP (ref 96–108)
CO2 SERPL-SCNC: 23 MMOL/L — SIGNIFICANT CHANGE UP (ref 22–31)
CO2 SERPL-SCNC: 23 MMOL/L — SIGNIFICANT CHANGE UP (ref 22–31)
CREAT SERPL-MCNC: 2.51 MG/DL — HIGH (ref 0.5–1.3)
CREAT SERPL-MCNC: 2.51 MG/DL — HIGH (ref 0.5–1.3)
EGFR: 27 ML/MIN/1.73M2 — LOW
EGFR: 27 ML/MIN/1.73M2 — LOW
EOSINOPHIL # BLD AUTO: 0.21 K/UL — SIGNIFICANT CHANGE UP (ref 0–0.5)
EOSINOPHIL # BLD AUTO: 0.21 K/UL — SIGNIFICANT CHANGE UP (ref 0–0.5)
EOSINOPHIL NFR BLD AUTO: 2.4 % — SIGNIFICANT CHANGE UP (ref 0–6)
EOSINOPHIL NFR BLD AUTO: 2.4 % — SIGNIFICANT CHANGE UP (ref 0–6)
GLUCOSE SERPL-MCNC: 96 MG/DL — SIGNIFICANT CHANGE UP (ref 70–99)
GLUCOSE SERPL-MCNC: 96 MG/DL — SIGNIFICANT CHANGE UP (ref 70–99)
HCT VFR BLD CALC: 23.7 % — LOW (ref 39–50)
HCT VFR BLD CALC: 23.7 % — LOW (ref 39–50)
HGB BLD-MCNC: 7.9 G/DL — LOW (ref 13–17)
HGB BLD-MCNC: 7.9 G/DL — LOW (ref 13–17)
IMM GRANULOCYTES NFR BLD AUTO: 0.7 % — SIGNIFICANT CHANGE UP (ref 0–0.9)
IMM GRANULOCYTES NFR BLD AUTO: 0.7 % — SIGNIFICANT CHANGE UP (ref 0–0.9)
INR BLD: 1.04 RATIO — SIGNIFICANT CHANGE UP (ref 0.85–1.18)
INR BLD: 1.04 RATIO — SIGNIFICANT CHANGE UP (ref 0.85–1.18)
LYMPHOCYTES # BLD AUTO: 1.15 K/UL — SIGNIFICANT CHANGE UP (ref 1–3.3)
LYMPHOCYTES # BLD AUTO: 1.15 K/UL — SIGNIFICANT CHANGE UP (ref 1–3.3)
LYMPHOCYTES # BLD AUTO: 13.1 % — SIGNIFICANT CHANGE UP (ref 13–44)
LYMPHOCYTES # BLD AUTO: 13.1 % — SIGNIFICANT CHANGE UP (ref 13–44)
MAGNESIUM SERPL-MCNC: 2.4 MG/DL — SIGNIFICANT CHANGE UP (ref 1.6–2.6)
MAGNESIUM SERPL-MCNC: 2.4 MG/DL — SIGNIFICANT CHANGE UP (ref 1.6–2.6)
MCHC RBC-ENTMCNC: 30.3 PG — SIGNIFICANT CHANGE UP (ref 27–34)
MCHC RBC-ENTMCNC: 30.3 PG — SIGNIFICANT CHANGE UP (ref 27–34)
MCHC RBC-ENTMCNC: 33.3 GM/DL — SIGNIFICANT CHANGE UP (ref 32–36)
MCHC RBC-ENTMCNC: 33.3 GM/DL — SIGNIFICANT CHANGE UP (ref 32–36)
MCV RBC AUTO: 90.8 FL — SIGNIFICANT CHANGE UP (ref 80–100)
MCV RBC AUTO: 90.8 FL — SIGNIFICANT CHANGE UP (ref 80–100)
MONOCYTES # BLD AUTO: 0.64 K/UL — SIGNIFICANT CHANGE UP (ref 0–0.9)
MONOCYTES # BLD AUTO: 0.64 K/UL — SIGNIFICANT CHANGE UP (ref 0–0.9)
MONOCYTES NFR BLD AUTO: 7.3 % — SIGNIFICANT CHANGE UP (ref 2–14)
MONOCYTES NFR BLD AUTO: 7.3 % — SIGNIFICANT CHANGE UP (ref 2–14)
NEUTROPHILS # BLD AUTO: 6.66 K/UL — SIGNIFICANT CHANGE UP (ref 1.8–7.4)
NEUTROPHILS # BLD AUTO: 6.66 K/UL — SIGNIFICANT CHANGE UP (ref 1.8–7.4)
NEUTROPHILS NFR BLD AUTO: 75.9 % — SIGNIFICANT CHANGE UP (ref 43–77)
NEUTROPHILS NFR BLD AUTO: 75.9 % — SIGNIFICANT CHANGE UP (ref 43–77)
NRBC # BLD: 0 /100 WBCS — SIGNIFICANT CHANGE UP (ref 0–0)
NRBC # BLD: 0 /100 WBCS — SIGNIFICANT CHANGE UP (ref 0–0)
PHOSPHATE SERPL-MCNC: 3.4 MG/DL — SIGNIFICANT CHANGE UP (ref 2.5–4.5)
PHOSPHATE SERPL-MCNC: 3.4 MG/DL — SIGNIFICANT CHANGE UP (ref 2.5–4.5)
PLATELET # BLD AUTO: 514 K/UL — HIGH (ref 150–400)
PLATELET # BLD AUTO: 514 K/UL — HIGH (ref 150–400)
POTASSIUM SERPL-MCNC: 4.6 MMOL/L — SIGNIFICANT CHANGE UP (ref 3.5–5.3)
POTASSIUM SERPL-MCNC: 4.6 MMOL/L — SIGNIFICANT CHANGE UP (ref 3.5–5.3)
POTASSIUM SERPL-SCNC: 4.6 MMOL/L — SIGNIFICANT CHANGE UP (ref 3.5–5.3)
POTASSIUM SERPL-SCNC: 4.6 MMOL/L — SIGNIFICANT CHANGE UP (ref 3.5–5.3)
PROT SERPL-MCNC: 7.7 G/DL — SIGNIFICANT CHANGE UP (ref 6–8.3)
PROT SERPL-MCNC: 7.7 G/DL — SIGNIFICANT CHANGE UP (ref 6–8.3)
PROTHROM AB SERPL-ACNC: 11.4 SEC — SIGNIFICANT CHANGE UP (ref 9.5–13)
PROTHROM AB SERPL-ACNC: 11.4 SEC — SIGNIFICANT CHANGE UP (ref 9.5–13)
RBC # BLD: 2.61 M/UL — LOW (ref 4.2–5.8)
RBC # BLD: 2.61 M/UL — LOW (ref 4.2–5.8)
RBC # FLD: 13.2 % — SIGNIFICANT CHANGE UP (ref 10.3–14.5)
RBC # FLD: 13.2 % — SIGNIFICANT CHANGE UP (ref 10.3–14.5)
SODIUM SERPL-SCNC: 139 MMOL/L — SIGNIFICANT CHANGE UP (ref 135–145)
SODIUM SERPL-SCNC: 139 MMOL/L — SIGNIFICANT CHANGE UP (ref 135–145)
WBC # BLD: 8.77 K/UL — SIGNIFICANT CHANGE UP (ref 3.8–10.5)
WBC # BLD: 8.77 K/UL — SIGNIFICANT CHANGE UP (ref 3.8–10.5)
WBC # FLD AUTO: 8.77 K/UL — SIGNIFICANT CHANGE UP (ref 3.8–10.5)
WBC # FLD AUTO: 8.77 K/UL — SIGNIFICANT CHANGE UP (ref 3.8–10.5)

## 2024-01-13 PROCEDURE — 73110 X-RAY EXAM OF WRIST: CPT | Mod: 26,RT

## 2024-01-13 PROCEDURE — 73502 X-RAY EXAM HIP UNI 2-3 VIEWS: CPT | Mod: 26,LT

## 2024-01-13 PROCEDURE — 73060 X-RAY EXAM OF HUMERUS: CPT | Mod: 26,RT

## 2024-01-13 PROCEDURE — 99285 EMERGENCY DEPT VISIT HI MDM: CPT

## 2024-01-13 PROCEDURE — 73080 X-RAY EXAM OF ELBOW: CPT | Mod: 26,RT,76

## 2024-01-13 PROCEDURE — 73552 X-RAY EXAM OF FEMUR 2/>: CPT | Mod: 26,LT

## 2024-01-13 PROCEDURE — 73562 X-RAY EXAM OF KNEE 3: CPT | Mod: 26,LT

## 2024-01-13 PROCEDURE — 73120 X-RAY EXAM OF HAND: CPT | Mod: 26,RT

## 2024-01-13 PROCEDURE — 73030 X-RAY EXAM OF SHOULDER: CPT | Mod: 26,RT

## 2024-01-13 PROCEDURE — 73090 X-RAY EXAM OF FOREARM: CPT | Mod: 26,LT

## 2024-01-13 PROCEDURE — 72170 X-RAY EXAM OF PELVIS: CPT | Mod: 26

## 2024-01-13 PROCEDURE — 74176 CT ABD & PELVIS W/O CONTRAST: CPT | Mod: 26,MA

## 2024-01-13 PROCEDURE — 70450 CT HEAD/BRAIN W/O DYE: CPT | Mod: 26,MD

## 2024-01-13 NOTE — ED ADULT NURSE NOTE - OBJECTIVE STATEMENT
69 y/o male, A&O x3, HTN, turp 1 yr ago presents to ED c/o fall 8 days ago. Pt endorses slipping on ice Jan 5th and hitting his head, getting up and falling again on the curb, unknown LOC. Pt states pain in head and RUE since fall. Pt affect is calm and appropriate, spontaneous unlabored breathing, PERRLA, equal strength and sensation bilaterally, bruising and swelling of RUE. Pt denies chest pain, SOB/difficulty breathing, fever/chills, abd pain, N/V/D, numbness/tingling. Safety and comfort measures maintained.

## 2024-01-13 NOTE — ED PROVIDER NOTE - PROGRESS NOTE DETAILS
H&H noted.  Mildly decreased relative to 6 months ago.  Patient has no shortness of breath, lightheadedness, dizziness, chest pain, palpitations, or exertional symptoms.  Ecchymosis to the right elbow/forearm may be contributing; he states that the swelling is improving as is the pain so seems unlikely to be acutely hemorrhaging into his extremity.  Less likely that anemia caused his falls given lack of above symptoms that would be c/w symptomatic anemia in the last week.  Still pending imaging but will add CT abdomen pelvis noncontrast to evaluate for RP collection.  --Jayy Bryant MD, Attending Physician Leydricah [place], DO (PGY1): ortho pages. Awaiting callback R skip fx.  Ortho at bedside applying splint.  Will d/w their attd re necessity of admission for ORIF v d/c c close f/u.  Imaging and labs otherwise non-actionable.  --Jayy Bryant MD, Attending Physician Recommended CDU for patient for echo given questionable syncopal fall.  Patient is not willing to stay overnight, understands that anemia could be contributing, syncope could be from cardiac cause.  He states he wants to go home and will follow up with his PCP on Tuesday.  He is willing to stay for rpt CBC tonight and understands that plan will still be CDU for prbc and echo if CBC is downtrending.  If stable he wishes to be discharged.  Still pending final ortho reads but resident believes pt appropriate for dc and close f/u, will speak with attending re this plan.  --BMM

## 2024-01-13 NOTE — ED PROVIDER NOTE - SHIFT CHANGE DETAILS
Attending MD Feliz: 68M w stable CKD, anemia, s/p three falls 8 days ago, first fall of day was mechanical other two for dizziness, RUE pain found to have +olecranon fx, ortho plan in place, if CBC downtrending then pRBC, CDU for echo in AM Attending MD Feliz: Initially received sign out on patient, but patient then dispositioned by Dr. Bryant.  I did not participate in the care of this patient.

## 2024-01-13 NOTE — CONSULT NOTE ADULT - NSCONSULTADDITIONALINFOA_GEN_ALL_CORE
I agree with the above note. All pertinent films have been reviewed. Please refer to clinical documentation of the history, physical examinations, data summary, and both assessment and plan as documented above and with which I agree.    Jovanny Springer MD  Attending Orthopedic Surgeon

## 2024-01-13 NOTE — ED ADULT TRIAGE NOTE - CHIEF COMPLAINT QUOTE
"I fell last week, I hit my head, I should of came to the ER but I didn't. I feel headache and R elbow pain"

## 2024-01-13 NOTE — ED PROVIDER NOTE - CARE PROVIDER_API CALL
Jovanny Springer  Orthopaedic Surgery  [address] [place] [address]  Phone: [phone]  Fax: [phone]  Follow Up Time: 4-6 Days   Jovanny Springer  Orthopaedic Surgery  [address], [address] [place] [address]  Phone: [phone]  Fax: [phone]  Follow Up Time: 4-6 Days

## 2024-01-13 NOTE — ED ADULT NURSE NOTE - NSFALLRISKINTERV_ED_ALL_ED
Communicate fall risk and risk factors to all staff, patient, and family/Provide visual cue: yellow wristband, yellow gown, etc/Reinforce activity limits and safety measures with patient and family/Call bell, personal items and telephone in reach/Instruct patient to call for assistance before getting out of bed/chair/stretcher/Non-slip footwear applied when patient is off stretcher/[place] to call system/Physically safe environment - no spills, clutter or unnecessary equipment/Purposeful Proactive Rounding/Room/bathroom lighting operational, light cord in reach Communicate fall risk and risk factors to all staff, patient, and family/Provide visual cue: yellow wristband, yellow gown, etc/Reinforce activity limits and safety measures with patient and family/Call bell, personal items and telephone in reach/Instruct patient to call for assistance before getting out of bed/chair/stretcher/Non-slip footwear applied when patient is off stretcher/[place] to call system/Physically safe environment - no spills, clutter or unnecessary equipment/Purposeful Proactive Rounding/Room/bathroom lighting operational, light cord in reach

## 2024-01-13 NOTE — ED PROVIDER NOTE - PROVIDER TOKENS
PROVIDER:[TOKEN:[[id number]],FOLLOWUP:[4-6 Days]] PROVIDER:[TOKEN:[[id number]],FOLLOWUP:[4-6 Days]]

## 2024-01-13 NOTE — ED PROVIDER NOTE - PATIENT PORTAL LINK FT
You can access the FollowMyHealth Patient Portal offered by [facility] by registering at the following website: [email or web address]. By joining [employer]’s FollowMyHealth portal, you will also be able to view your health information using other applications (apps) compatible with our system. You can access the FollowMyHealth Patient Portal offered by [facility] by registering at the following website: [email or web address]. By joining [employer]’s FollowMyHealth portal, you will also be able to view your health information using other applications (apps) compatible with our system.

## 2024-01-13 NOTE — ED PROVIDER NOTE - OBJECTIVE STATEMENT
67 y/o male, history CKD, presenting to the ED today for eval after falls that occurred on January 5th.Patient states that he was walking on icy pavement, slipped and fell hitting his head.  He got up, walked to the sidewalk where he fell again, hitting his head, thinks he lost consciousness this time.  Patient states that he was able to make at home, and in the shower later that day, felt nauseous and dizzy, resulting in a fall, and again hit his head.  Patient states that he thinks that he suffers a concussion.  He was experiencing nausea and headache and thought that the headache would go away with time.  However it has not resolved, this prompted his ED visit today as he is afraid that he has head bleed.  Patient also complaining of pain to the right elbow, stating that he is unable to switch gears in his car because of the pain, also unable to hold a cup up for more than 3 seconds because of the pain.  Patient also with left lower extremity pain, mainly in the left buttock.  He denies fever, chest pain, shortness of breath, palpitation, lightheadedness, abdominal pain, numbness or tingling in his extremities, loss of sensation, or inability to walk.  Patient is not on anticoagulation.

## 2024-01-13 NOTE — ED PROVIDER NOTE - ATTENDING CONTRIBUTION TO CARE
--Jayy Bryant MD, Attending Physician I have reviewed this note, the presenting symptoms, and the Chief Complaint and the History of Present Illness as documented, with the other care provider(s) including resident, ACP, and nurses on the patient care team. I have also reviewed this patient's past medical/surgical history and social/family history as reviewed and listed in this electronic medical record.  I agree with the resident/ACP documentation except where noted otherwise in my personal documentation.  See MDM.  --aJyy Bryant MD, Attending Physician I have reviewed this note, the presenting symptoms, and the Chief Complaint and the History of Present Illness as documented, with the other care provider(s) including resident, ACP, and nurses on the patient care team. I have also reviewed this patient's past medical/surgical history and social/family history as reviewed and listed in this electronic medical record.  I agree with the resident/ACP documentation except where noted otherwise in my personal documentation.  See MDM.  --Jayy Bryant MD, Attending Physician

## 2024-01-13 NOTE — ED PROVIDER NOTE - CLINICAL SUMMARY MEDICAL DECISION MAKING FREE TEXT BOX
68-year-old male, history of CKD, presented to the ED today for eval after a fall that occurred of January 5.  Patient had 3 falls, hit his head all 3 times.  No AC use.  Had nausea.  + Persistent headache.  Otherwise no abdominal pain, fever, numbness or tingling in his extremities, inability to ambulate.  Vital signs are within normal limits.  Afebrile.  On exam, patient is sitting on stretcher, in no acute distress.  He is neuroexam is nonfocal.  Normal heart exam lungs are clear to auscultation bilaterally.  There is a bruise to the right forearm, patient is unable to fully extend the right forearm, the extremity is neurovascularly intact.  There is tenderness to palpation in the left buttock, extremity is neurovascularly intact.  No abdominal tenderness.  Low suspicious for intracranial bleed, however given persistent headache, will obtain CT head without contrast to rule out intracranial bleed.  Given loss of consciousness, will obtain EKG to screen for any cardiac dysrhythmia.  Will also obtain labs to screen for any electrolyte imbalance.  Patient is well-appearing, if workup negative, he will be able to go home. 68-year-old male, history of CKD, presented to the ED today for eval after a fall that occurred of January 5.  Patient had 3 falls, hit his head all 3 times.  No AC use.  Had nausea.  + Persistent headache.  Otherwise no abdominal pain, fever, numbness or tingling in his extremities, inability to ambulate.  Vital signs are within normal limits.  Afebrile.  On exam, patient is sitting on stretcher, in no acute distress.  He is neuroexam is nonfocal.  Normal heart exam lungs are clear to auscultation bilaterally.  There is a bruise to the right forearm, patient is unable to fully extend the right forearm, the extremity is neurovascularly intact.  There is tenderness to palpation in the left buttock, extremity is neurovascularly intact.  No abdominal tenderness.  Low suspicious for intracranial bleed, however given persistent headache, will obtain CT head without contrast to rule out intracranial bleed.  Given loss of consciousness, will obtain EKG to screen for any cardiac dysrhythmia.  Will also obtain labs to screen for any electrolyte imbalance.  Patient is well-appearing, if workup negative, he will be able to go home.     Attending Statement: Agree with the above.  Trauma 8 days ago with R arm injury and head strike.  3 separate falls, all from ground level, two trip/falls, the third in the shower less certain re cause.  No palps, sob, lightheadedness, CP however that preceded or followed injuries.  Pt has no h/o other than HA and intermittent nausea since the injury.  DDx as above, some c/f R elbow fx as well given limitation to range of motion and extensive bruising.  Will reassess.  --Jayy Bryant MD, Attending Physician

## 2024-01-13 NOTE — CONSULT NOTE ADULT - SUBJECTIVE AND OBJECTIVE BOX
HPI  68yMale R-hand dominant c/o R elbow pain s/p mechanical fall on 1/5. Pt reports he fell 3 times that day due to slipping on ice. States he had right elbow pain but thought it would go away. Endorses HS but not sure if sustained LOC. Denies numbness/tingling in the RUE. Denies any other trauma/injuries at this time. Pt has been able to ambulate after the falls.    ROS  Negative unless otherwise specified in HPI.    PAST MEDICAL & SURGICAL Hx  PAST MEDICAL & SURGICAL HISTORY:  Hypertension  no longer on medication      H/O: rheumatic fever      S/P tonsillectomy      S/P eye surgery          MEDICATIONS  Home Medications:  esomeprazole 40 mg oral delayed release capsule: 1 orally once a day (at bedtime) (01 Jul 2023 14:51)      ALLERGIES  No Known Allergies      FAMILY Hx  FAMILY HISTORY:  FH: bladder cancer (Father)    FH: CAD (coronary artery disease) (Mother)        SOCIAL Hx  Social History:      VITALS  Vital Signs Last 24 Hrs  T(C): 36.8 (13 Jan 2024 22:00), Max: 36.8 (13 Jan 2024 22:00)  T(F): 98.2 (13 Jan 2024 22:00), Max: 98.2 (13 Jan 2024 22:00)  HR: 72 (13 Jan 2024 22:00) (72 - 87)  BP: 145/75 (13 Jan 2024 22:00) (145/75 - 152/82)  BP(mean): --  RR: 18 (13 Jan 2024 22:00) (18 - 20)  SpO2: 100% (13 Jan 2024 22:00) (100% - 100%)    Parameters below as of 13 Jan 2024 22:00  Patient On (Oxygen Delivery Method): room air        PHYSICAL EXAM  Gen: Lying in bed, NAD  Resp: No increased WOB  RUE:  Skin intact, +edema and +healing ecchymosis over elbow  +TTP over elbow, no palpable triceps defect, no TTP along remainder of extremity; compartments soft  Limited elbow ROM 2/2 pain  Motor: Musc/Med/Rad/AIN/PIN/U intact  Sensory: Musc/Med/Rad/U SILT  +Rad pulse, WWP    Secondary Assessment:  NC/AT, NTTP of clavicles, NTTP of C-spine,T-spine, or L-spine in the midline and paraspinal areas; NTTP of pelvis  LUE: NTTP of Shoulder, Elbow, Wrist, Hand; NT with AROM/PROM of Shoulder, Elbow, Wrist, Hand; AIN/PIN/Med/Uln/Msc/Rad/Ax intact  LLE: Able to SLR, NT with Log Roll, NT with Heel Strike, NTTP of Hip, Knee, Ankle, Foot; NT with AROM/PROM of Hip, Knee, Ankle, Foot; Q/H/GSC/TA/EHL/FHL intact  RLE: Able to SLR, NT with Log Roll, NT with Heel Strike, NTTP of Hip, Knee, Ankle, Foot; NT with AROM/PROM of Hip, Knee, Ankle, Foot; Q/H/GSC/TA/EHL/FHL intact      LABS                        7.9    8.77  )-----------( 514      ( 13 Jan 2024 18:46 )             23.7     01-13    139  |  103  |  37<H>  ----------------------------<  96  4.6   |  23  |  2.51<H>    Ca    8.8      13 Jan 2024 18:46  Phos  3.4     01-13  Mg     2.4     01-13    TPro  7.7  /  Alb  4.0  /  TBili  0.2  /  DBili  x   /  AST  27  /  ALT  17  /  AlkPhos  81  01-13    PT/INR - ( 13 Jan 2024 18:46 )   PT: 11.4 sec;   INR: 1.04 ratio         PTT - ( 13 Jan 2024 18:46 )  PTT:27.8 sec    IMAGING  XRs: R skip fx (personal read)    PROCEDURE  A well-padded, well-molded plaster splint was applied, and the patient was neurovascularly intact afterward. The patient tolerated the procedure well without evidence of complications. Post-reduction XRs demonstrated acceptable alignment. The patient was informed about splint precautions (keep dry, do not stick anything inside, monitor for signs/symptoms of increased compartmental pressure: uncontrolled pain, worsening numbness/tingling, severe pain with movement of the fingers/toes) and verbalized understanding.    ASSESSMENT & PLAN  68yMale w/ R olecranon fx s/p immobilization.  -ALEXANDER ORELLANA in a posterior slab splint  -splint precautions  -pain control  -ice/cold compress, elevation  -no acute ortho surgery at this time, ortho stable for DC  -f/u outpt with Dr. Springer within 1 week, call office for appt

## 2024-01-13 NOTE — ED PROVIDER NOTE - NSFOLLOWUPINSTRUCTIONS_ED_ALL_ED_FT
Please follow up with Dr. Springer in the next 5-7 days for your right elbow fracture    Keep your arm in its sling and DO NOT get your splint wet.  Do not remove your splint.  Keep your arm elevated as much as possible to reduce swelling    Follow up closely with your primary care physician and kidney doctor for your anemia    Continue with your medications as prescribed    Tylenol 500 mg (one extra strength pill) four times a day as needed for pain     Return to the ER for severe pain, palpitations, lightheadedness, dizziness, vomiting, chest pain, difficulty breathing, or ANY other concerns

## 2024-01-13 NOTE — ED PROVIDER NOTE - CARE PROVIDERS DIRECT ADDRESSES
,[email or web address]

## 2024-01-13 NOTE — ED PROVIDER NOTE - PHYSICAL EXAMINATION
Constitutional: Well developed, well nourished. NAD  TRAUMA: ABC intact. GCS 15  Head: Normocephalic, atraumatic.  Eyes: PERRL. EOMI. No Raccoon eyes.   ENT: No nasal discharge. No septal hematoma. No Little sign. Mucous membranes moist.  Neck: Supple. Painless ROM. No midline tenderness, stepoffs.  Cardiovascular: Normal S1, S2. Regular rate and rhythm. No murmurs, rubs, or gallops.  Pulmonary: Normal respiratory rate and effort. Lungs clear to auscultation bilaterally. No wheezing, rales, or rhonchi.  CHEST: No chest wall tenderness, crepitus.  Abdominal: Soft. Nondistended. Nontender. No rebound, guarding, rigidity.  BACK: No midline T/L/S tenderness, stepoffs. No saddle paresthesia.  Extremities. Pelvis stable. No traumatic deformities, right elbow tenderness to palpation, bruise to right forearm, unable to fully extend right forearm due to pain, left buttock TTP  Skin: No rashes, cyanosis, lacerations, abrasions.  Neuro: AAOx3. Strength 5/5 in all extremities. Sensation intact throughout. No focal neurological deficits.  Psych: Normal mood. Normal affect.

## 2024-01-14 VITALS
SYSTOLIC BLOOD PRESSURE: 147 MMHG | DIASTOLIC BLOOD PRESSURE: 77 MMHG | RESPIRATION RATE: 19 BRPM | HEART RATE: 88 BPM | TEMPERATURE: 99 F | OXYGEN SATURATION: 100 %

## 2024-01-14 LAB
HCT VFR BLD CALC: 25.3 % — LOW (ref 39–50)
HCT VFR BLD CALC: 25.3 % — LOW (ref 39–50)
HGB BLD-MCNC: 8.4 G/DL — LOW (ref 13–17)
HGB BLD-MCNC: 8.4 G/DL — LOW (ref 13–17)
MCHC RBC-ENTMCNC: 30.3 PG — SIGNIFICANT CHANGE UP (ref 27–34)
MCHC RBC-ENTMCNC: 30.3 PG — SIGNIFICANT CHANGE UP (ref 27–34)
MCHC RBC-ENTMCNC: 33.2 GM/DL — SIGNIFICANT CHANGE UP (ref 32–36)
MCHC RBC-ENTMCNC: 33.2 GM/DL — SIGNIFICANT CHANGE UP (ref 32–36)
MCV RBC AUTO: 91.3 FL — SIGNIFICANT CHANGE UP (ref 80–100)
MCV RBC AUTO: 91.3 FL — SIGNIFICANT CHANGE UP (ref 80–100)
NRBC # BLD: 0 /100 WBCS — SIGNIFICANT CHANGE UP (ref 0–0)
NRBC # BLD: 0 /100 WBCS — SIGNIFICANT CHANGE UP (ref 0–0)
PLATELET # BLD AUTO: 513 K/UL — HIGH (ref 150–400)
PLATELET # BLD AUTO: 513 K/UL — HIGH (ref 150–400)
RBC # BLD: 2.77 M/UL — LOW (ref 4.2–5.8)
RBC # BLD: 2.77 M/UL — LOW (ref 4.2–5.8)
RBC # FLD: 13.1 % — SIGNIFICANT CHANGE UP (ref 10.3–14.5)
RBC # FLD: 13.1 % — SIGNIFICANT CHANGE UP (ref 10.3–14.5)
WBC # BLD: 9.47 K/UL — SIGNIFICANT CHANGE UP (ref 3.8–10.5)
WBC # BLD: 9.47 K/UL — SIGNIFICANT CHANGE UP (ref 3.8–10.5)
WBC # FLD AUTO: 9.47 K/UL — SIGNIFICANT CHANGE UP (ref 3.8–10.5)
WBC # FLD AUTO: 9.47 K/UL — SIGNIFICANT CHANGE UP (ref 3.8–10.5)

## 2024-01-18 ENCOUNTER — APPOINTMENT (OUTPATIENT)
Dept: ORTHOPEDIC SURGERY | Facility: CLINIC | Age: 69
End: 2024-01-18
Payer: MEDICARE

## 2024-01-18 VITALS — BODY MASS INDEX: 21.66 KG/M2 | HEIGHT: 65 IN | WEIGHT: 130 LBS

## 2024-01-18 VITALS — SYSTOLIC BLOOD PRESSURE: 132 MMHG | HEART RATE: 88 BPM | DIASTOLIC BLOOD PRESSURE: 70 MMHG

## 2024-01-18 PROCEDURE — 99204 OFFICE O/P NEW MOD 45 MIN: CPT

## 2024-01-18 NOTE — PHYSICAL EXAM
[de-identified] : The patient is sitting comfortably in the exam room.  RIGHT arm. -Splint intact, clean and dry -Able to make a fist -Wiggles fingers -Sensation intact in fingers -Sensation intact throughout fingers -Fingers warm and well-perfused  [de-identified] : X-rays from the emergency room show displaced fracture of the right olecranon  ACC: [id number] EXAM: XR HUMERUS MIN 2 VIEWS RT ORDERED BY: LEYDRICAH [place]  ACC: [id number] EXAM: XR ELBOW COMP MIN 3V RT ORDERED BY: LEYDRICAH [place]  ACC: [id number] EXAM: XR FOREARM 2 VIEWS RT ORDERED BY: LEYDRICAH [place]  ACC: [id number] EXAM: XR WRIST COMP MIN 3 VIEWS RT ORDERED BY: LEYDRICAH [place]  ACC: [id number] EXAM: XR HAND 2 VIEWS RT ORDERED BY: LEYDRICAH [place]  PROCEDURE DATE: 01/13/2024  INTERPRETATION: CLINICAL INDICATION: Right arm pain status post fall  TECHNIQUE: 2 views of the right shoulder. One view of the right humerus, 2 images. 3 views of the right elbow. 2 views of the right forearm. 3 views of the right wrist. 2 views of the right hand.  COMPARISON: No similar prior comparisons available..  FINDINGS: Right shoulder: No acute fracture or dislocation. Joint spaces are maintained.  Right humerus: No acute fracture or dislocation.  Right elbow/forearm: Acute transverse fracture of the olecranon process of the right ulna. There is slight proximal migration of the proximal fracture fragment. No other fracture or dislocation. Moderate elbow joint effusion. Soft tissue swelling around the elbow.  Right wrist: No acute fracture or dislocation. There is negative ulnar variance with distal radial ulnar joint arthrosis. Joint spaces otherwise preserved.  Right hand: No acute fracture or dislocation. Joint spaces are maintained, without bony erosions.  IMPRESSION: Acute transverse fracture of the olecranon process of the right ulna. There is slight proximal migration of the proximal fracture fragment. Moderate right elbow joint effusion and soft tissue swelling around the right elbow.  --- End of Report ---  NORY HOWARD MD; Resident Radiologist This document has been electronically signed. FRANK MONTOYA MD; Attending Radiologist This document has been electronically signed. Jan 14 2024 7:34AM

## 2024-01-18 NOTE — HISTORY OF PRESENT ILLNESS
[de-identified] : Mr. NANI SHI is a 68 year old gentleman presenting for initial evaluation of a right elbow injury sustained on 1/13/24 after a slip on ice. He was seen at [facility] ED where x-rays were performed. He presents today in a posterior splint.   PMH: HTN, denies smoking  Works as a

## 2024-01-19 ENCOUNTER — OUTPATIENT (OUTPATIENT)
Dept: OUTPATIENT SERVICES | Facility: HOSPITAL | Age: 69
LOS: 1 days | End: 2024-01-19
Payer: MEDICARE

## 2024-01-19 VITALS
HEART RATE: 86 BPM | DIASTOLIC BLOOD PRESSURE: 76 MMHG | SYSTOLIC BLOOD PRESSURE: 126 MMHG | TEMPERATURE: 98 F | RESPIRATION RATE: 20 BRPM | WEIGHT: 121.92 LBS | HEIGHT: 65 IN | OXYGEN SATURATION: 100 %

## 2024-01-19 DIAGNOSIS — Z98.890 OTHER SPECIFIED POSTPROCEDURAL STATES: Chronic | ICD-10-CM

## 2024-01-19 DIAGNOSIS — S52.021A DISPLACED FRACTURE OF OLECRANON PROCESS WITHOUT INTRAARTICULAR EXTENSION OF RIGHT ULNA, INITIAL ENCOUNTER FOR CLOSED FRACTURE: ICD-10-CM

## 2024-01-19 DIAGNOSIS — Z90.89 ACQUIRED ABSENCE OF OTHER ORGANS: Chronic | ICD-10-CM

## 2024-01-19 DIAGNOSIS — Z01.818 ENCOUNTER FOR OTHER PREPROCEDURAL EXAMINATION: ICD-10-CM

## 2024-01-19 DIAGNOSIS — Z90.79 ACQUIRED ABSENCE OF OTHER GENITAL ORGAN(S): Chronic | ICD-10-CM

## 2024-01-19 DIAGNOSIS — S52.023A DISPLACED FRACTURE OF OLECRANON PROCESS WITHOUT INTRAARTICULAR EXTENSION OF UNSPECIFIED ULNA, INITIAL ENCOUNTER FOR CLOSED FRACTURE: ICD-10-CM

## 2024-01-19 NOTE — H&P PST ADULT - ATTENDING COMMENTS
Associated images, notes, and labs were reviewed. The patient was seen and examined. Risks and benefits of operative versus nonoperative management were discussed with the patient. The patient showed a good understanding of the injury and the treatment options. They would like to move forward with operative management of the olecranon fracture.

## 2024-01-19 NOTE — H&P PST ADULT - NSICDXPASTMEDICALHX_GEN_ALL_CORE_FT
PAST MEDICAL HISTORY:  Anemia     Chronic kidney disease (CKD)     H/O: rheumatic fever     History of BPH     Hypertension no longer on medication

## 2024-01-19 NOTE — H&P PST ADULT - HISTORY OF PRESENT ILLNESS
67 yo male with PMHX of HTN, BPH s/p TURP, GERD,  CKD fell on ice 1/5/24, seen in ER 1/13/24 revealed fracture of right elbow. pain 8/10   now for surgery.

## 2024-01-19 NOTE — H&P PST ADULT - BP NONINVASIVE DIASTOLIC (MM HG)
76 Complex Repair And Single Advancement Flap Text: The defect edges were debeveled with a #15 scalpel blade.  The primary defect was closed partially with a complex linear closure.  Given the location of the remaining defect, shape of the defect and the proximity to free margins a single advancement flap was deemed most appropriate for complete closure of the defect.  Using a sterile surgical marker, an appropriate advancement flap was drawn incorporating the defect and placing the expected incisions within the relaxed skin tension lines where possible. The area thus outlined was incised deep to adipose tissue with a #15 scalpel blade. The skin margins were undermined to an appropriate distance in all directions utilizing iris scissors and carried over to close the primary defect.

## 2024-01-21 ENCOUNTER — TRANSCRIPTION ENCOUNTER (OUTPATIENT)
Age: 69
End: 2024-01-21

## 2024-01-22 ENCOUNTER — OUTPATIENT (OUTPATIENT)
Dept: INPATIENT UNIT | Facility: HOSPITAL | Age: 69
LOS: 1 days | End: 2024-01-22
Payer: MEDICARE

## 2024-01-22 ENCOUNTER — TRANSCRIPTION ENCOUNTER (OUTPATIENT)
Age: 69
End: 2024-01-22

## 2024-01-22 ENCOUNTER — APPOINTMENT (OUTPATIENT)
Dept: ORTHOPEDIC SURGERY | Facility: HOSPITAL | Age: 69
End: 2024-01-22

## 2024-01-22 VITALS
OXYGEN SATURATION: 100 % | SYSTOLIC BLOOD PRESSURE: 112 MMHG | TEMPERATURE: 98 F | HEART RATE: 70 BPM | DIASTOLIC BLOOD PRESSURE: 64 MMHG | RESPIRATION RATE: 18 BRPM

## 2024-01-22 VITALS
TEMPERATURE: 98 F | DIASTOLIC BLOOD PRESSURE: 74 MMHG | OXYGEN SATURATION: 97 % | SYSTOLIC BLOOD PRESSURE: 135 MMHG | RESPIRATION RATE: 16 BRPM | HEART RATE: 83 BPM

## 2024-01-22 DIAGNOSIS — S52.021A DISPLACED FRACTURE OF OLECRANON PROCESS WITHOUT INTRAARTICULAR EXTENSION OF RIGHT ULNA, INITIAL ENCOUNTER FOR CLOSED FRACTURE: ICD-10-CM

## 2024-01-22 DIAGNOSIS — Z90.89 ACQUIRED ABSENCE OF OTHER ORGANS: Chronic | ICD-10-CM

## 2024-01-22 DIAGNOSIS — Z90.79 ACQUIRED ABSENCE OF OTHER GENITAL ORGAN(S): Chronic | ICD-10-CM

## 2024-01-22 DIAGNOSIS — Z98.890 OTHER SPECIFIED POSTPROCEDURAL STATES: Chronic | ICD-10-CM

## 2024-01-22 PROCEDURE — 86900 BLOOD TYPING SEROLOGIC ABO: CPT

## 2024-01-22 PROCEDURE — 83735 ASSAY OF MAGNESIUM: CPT

## 2024-01-22 PROCEDURE — 70450 CT HEAD/BRAIN W/O DYE: CPT | Mod: MD

## 2024-01-22 PROCEDURE — 72170 X-RAY EXAM OF PELVIS: CPT

## 2024-01-22 PROCEDURE — 99285 EMERGENCY DEPT VISIT HI MDM: CPT | Mod: 25

## 2024-01-22 PROCEDURE — 73090 X-RAY EXAM OF FOREARM: CPT

## 2024-01-22 PROCEDURE — 73030 X-RAY EXAM OF SHOULDER: CPT

## 2024-01-22 PROCEDURE — 73552 X-RAY EXAM OF FEMUR 2/>: CPT

## 2024-01-22 PROCEDURE — 84100 ASSAY OF PHOSPHORUS: CPT

## 2024-01-22 PROCEDURE — 85025 COMPLETE CBC W/AUTO DIFF WBC: CPT

## 2024-01-22 PROCEDURE — G0463: CPT

## 2024-01-22 PROCEDURE — 24685 OPTX ULNAR FX PROX END W/FIX: CPT | Mod: RT

## 2024-01-22 PROCEDURE — 73502 X-RAY EXAM HIP UNI 2-3 VIEWS: CPT

## 2024-01-22 PROCEDURE — 86901 BLOOD TYPING SEROLOGIC RH(D): CPT

## 2024-01-22 PROCEDURE — 74176 CT ABD & PELVIS W/O CONTRAST: CPT | Mod: MA

## 2024-01-22 PROCEDURE — 36415 COLL VENOUS BLD VENIPUNCTURE: CPT

## 2024-01-22 PROCEDURE — 85610 PROTHROMBIN TIME: CPT

## 2024-01-22 PROCEDURE — 80053 COMPREHEN METABOLIC PANEL: CPT

## 2024-01-22 PROCEDURE — 76000 FLUOROSCOPY <1 HR PHYS/QHP: CPT

## 2024-01-22 PROCEDURE — 85027 COMPLETE CBC AUTOMATED: CPT

## 2024-01-22 PROCEDURE — 93005 ELECTROCARDIOGRAM TRACING: CPT

## 2024-01-22 PROCEDURE — C1713: CPT

## 2024-01-22 PROCEDURE — C9399: CPT

## 2024-01-22 PROCEDURE — 73562 X-RAY EXAM OF KNEE 3: CPT

## 2024-01-22 PROCEDURE — 73110 X-RAY EXAM OF WRIST: CPT

## 2024-01-22 PROCEDURE — 73080 X-RAY EXAM OF ELBOW: CPT

## 2024-01-22 PROCEDURE — 85730 THROMBOPLASTIN TIME PARTIAL: CPT

## 2024-01-22 PROCEDURE — 73120 X-RAY EXAM OF HAND: CPT

## 2024-01-22 PROCEDURE — 73060 X-RAY EXAM OF HUMERUS: CPT

## 2024-01-22 PROCEDURE — 86850 RBC ANTIBODY SCREEN: CPT

## 2024-01-22 DEVICE — SCREW CORT S-T 3.5X26MM: Type: IMPLANTABLE DEVICE | Site: RIGHT | Status: FUNCTIONAL

## 2024-01-22 DEVICE — SCREW LKG VA SLF TAP W/ T8 STARDRIVE 2.7X32MM: Type: IMPLANTABLE DEVICE | Site: RIGHT | Status: FUNCTIONAL

## 2024-01-22 DEVICE — IMPLANTABLE DEVICE: Type: IMPLANTABLE DEVICE | Site: RIGHT | Status: FUNCTIONAL

## 2024-01-22 DEVICE — SCREW LKG VA SLF TAP W/ T8 STARDRIVE 2.7X22MM: Type: IMPLANTABLE DEVICE | Site: RIGHT | Status: FUNCTIONAL

## 2024-01-22 DEVICE — SCREW LKG VA SLF TAP W/ T8 STARDRIVE 2.7X26MM: Type: IMPLANTABLE DEVICE | Site: RIGHT | Status: FUNCTIONAL

## 2024-01-22 DEVICE — K-WIRE SYNTHES TROCAR POINT 1.6MM X 150MM: Type: IMPLANTABLE DEVICE | Site: RIGHT | Status: FUNCTIONAL

## 2024-01-22 DEVICE — SCREW LKG VA SLF TAP W/ T8 STARDRIVE 2.7X46MM: Type: IMPLANTABLE DEVICE | Site: RIGHT | Status: FUNCTIONAL

## 2024-01-22 DEVICE — SCREW LKG VA SLF TAP W/ T8 STARDRIVE 2.7X16MM: Type: IMPLANTABLE DEVICE | Site: RIGHT | Status: FUNCTIONAL

## 2024-01-22 DEVICE — SCREW LOKG SLF-TPNG W/ STARDRIVE RECESS 3.5X24MM: Type: IMPLANTABLE DEVICE | Site: RIGHT | Status: FUNCTIONAL

## 2024-01-22 DEVICE — SCREW LOKG SLF-TPNG W/ STARDRIVE RECESS 3.5X22MM: Type: IMPLANTABLE DEVICE | Site: RIGHT | Status: FUNCTIONAL

## 2024-01-22 RX ORDER — ONDANSETRON 8 MG/1
4 TABLET, FILM COATED ORAL ONCE
Refills: 0 | Status: DISCONTINUED | OUTPATIENT
Start: 2024-01-22 | End: 2024-01-22

## 2024-01-22 RX ORDER — HYDROMORPHONE HYDROCHLORIDE 2 MG/ML
0.5 INJECTION INTRAMUSCULAR; INTRAVENOUS; SUBCUTANEOUS
Refills: 0 | Status: DISCONTINUED | OUTPATIENT
Start: 2024-01-22 | End: 2024-01-22

## 2024-01-22 RX ORDER — OXYCODONE HYDROCHLORIDE 5 MG/1
1 TABLET ORAL
Qty: 20 | Refills: 0
Start: 2024-01-22 | End: 2024-01-26

## 2024-01-22 RX ORDER — ESOMEPRAZOLE MAGNESIUM 40 MG/1
1 CAPSULE, DELAYED RELEASE ORAL
Refills: 0 | DISCHARGE

## 2024-01-22 RX ORDER — ACETAMINOPHEN 500 MG
975 TABLET ORAL ONCE
Refills: 0 | Status: COMPLETED | OUTPATIENT
Start: 2024-01-22 | End: 2024-01-22

## 2024-01-22 RX ORDER — FENTANYL CITRATE 50 UG/ML
25 INJECTION INTRAVENOUS
Refills: 0 | Status: DISCONTINUED | OUTPATIENT
Start: 2024-01-22 | End: 2024-01-22

## 2024-01-22 RX ADMIN — HYDROMORPHONE HYDROCHLORIDE 0.5 MILLIGRAM(S): 2 INJECTION INTRAMUSCULAR; INTRAVENOUS; SUBCUTANEOUS at 14:25

## 2024-01-22 RX ADMIN — HYDROMORPHONE HYDROCHLORIDE 0.5 MILLIGRAM(S): 2 INJECTION INTRAMUSCULAR; INTRAVENOUS; SUBCUTANEOUS at 14:10

## 2024-01-22 RX ADMIN — Medication 975 MILLIGRAM(S): at 16:41

## 2024-01-22 RX ADMIN — HYDROMORPHONE HYDROCHLORIDE 0.5 MILLIGRAM(S): 2 INJECTION INTRAMUSCULAR; INTRAVENOUS; SUBCUTANEOUS at 13:40

## 2024-01-22 RX ADMIN — Medication 975 MILLIGRAM(S): at 17:11

## 2024-01-22 RX ADMIN — HYDROMORPHONE HYDROCHLORIDE 0.5 MILLIGRAM(S): 2 INJECTION INTRAMUSCULAR; INTRAVENOUS; SUBCUTANEOUS at 13:23

## 2024-01-22 NOTE — PRE-OP CHECKLIST - LOOSE TEETH
 VT ESOPHAGOGASTRODUODENOSCOPY TRANSORAL DIAGNOSTIC N/A 1/26/2018    EGD ESOPHAGOGASTRODUODENOSCOPY WITH DILATION performed by Anthony Carrillo MD at [place] [id number]     Social History     Social History    Marital status: Single     Spouse name: N/A    Number of children: N/A    Years of education: N/A     Social History Main Topics    Smoking status: Never Smoker    Smokeless tobacco: Never Used    Alcohol use No    Drug use: No    Sexual activity: Not Currently     Other Topics Concern    None     Social History Narrative    None     Family History   Problem Relation Age of Onset    Heart Disease Father     Stroke Father     Diabetes Father     Stomach Cancer Father     Other Mother      Bowel Obstruction     Current Outpatient Prescriptions   Medication Sig Dispense Refill    docusate sodium (COLACE) 100 MG capsule Take 100 mg by mouth 2 times daily      pantoprazole (PROTONIX) 40 MG tablet Take 1 tablet by mouth daily 30 tablet 3    levothyroxine (SYNTHROID) 150 MCG tablet Take 1 tablet by mouth Daily. 30 tablet 5    conjugated estrogens (PREMARIN) 0.625 MG/GM vaginal cream Place 0.5 g vaginally Twice a Week 3 Tube 3     No current facility-administered medications for this visit. Ciprofloxacin  reviewed      Review of Systems   Constitutional: Negative. HENT: Negative. Eyes: Positive for visual disturbance. Respiratory: Negative. Cardiovascular: Negative. Gastrointestinal: Positive for abdominal pain. Negative for abdominal distention, constipation, diarrhea, nausea and vomiting. Endocrine: Negative. Genitourinary: Positive for urgency. Negative for decreased urine volume, difficulty urinating, dysuria, enuresis, flank pain, frequency, genital sores, vaginal bleeding and vaginal discharge. Musculoskeletal: Positive for back pain. Skin: Negative. Allergic/Immunologic: Negative. Neurological: Negative.     Hematological: Does Urobilinogen, UA 02/06/2018  3:05 PM Unknown   0.2    Leukocytes, UA 02/06/2018  3:05 PM Unknown   small    Nitrite, UA 02/06/2018  3:05 PM Unknown   neg      1/19/2018  3:37 PM - Thomas Abernathy Incoming Lab Results From Soft     Component Results     Component Value Ref Range & Units Status Collected Lab   Sodium 139  132 - 144 mEq/L Final 01/19/2018  9:14 AM [facility]   Potassium 4.6  3.5 - 5.1 mEq/L Final 01/19/2018  9:14 AM [facility]   Chloride 100  98 - 107 mEq/L Final 01/19/2018  9:14 AM [facility]   CO2 26  22 - 29 mEq/L Final 01/19/2018  9:14 AM [facility]   Anion Gap 13  7 - 13 mEq/L Final 01/19/2018  9:14 AM [facility]   Glucose 91  74 - 109 mg/dL Final 01/19/2018  9:14 AM [facility]   BUN 20  8 - 23 mg/dL Final 01/19/2018  9:14 AM [facility]   CREATININE 0.56  0.50 - 0.90 mg/dL Final 01/19/2018  9:14 AM [facility]   GFR Non- >60.0  >60 Final 01/19/2018  9:14 AM [facility]   >60 mL/min/1.73m2 EGFR, calc. for ages 25 and older using the   MDRD formula (not corrected for weight), is valid for stable   renal function. GFR  >60.0  >60 Final 01/19/2018  9:14 AM [facility]   >60 mL/min/1.73m2 EGFR, calc. for ages 25 and older using the   MDRD formula (not corrected for weight), is      1/21/2018  9:00 AM - [facility] Incoming Lab Results From Soft     Component Results     Component Collected Lab   Urine Culture, Routine 01/19/2018  8:55 AM [address] [facility]   No growth 24 hours    Testing Performed By     Lab - [address] Name Director Address Valid Date Range   [address] [facility] Sachi Toscano MD [address]   [place] [address] 08/30/17 1247-Present   Narrative     ORDER#: [id number]                          ORDERED BY: Christine Luis  SOURCE: Urine Clean Catch                 stricture, retention, perforation, need for multiple procedures and she wants to proceed as planned. Plan:      1. CT Urogram  2. Office cystoscopy, local in 1-2 weeks  3.  Bactrim DS x one prior no

## 2024-01-22 NOTE — PRE-OP CHECKLIST - HIBICLENS SHOWER 1 DATE
ASSESSMENT  1. 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 Acute blood loss anemia sec to GI bleeding, source undetermined  3. Hx of gastric ulcers  4. Hx of VT    PLAN  1. Video capsule endoscopy in progress. No overt source found on EGD. To be completed tonight at 8PM.  2. Resume A/C with Lovenox SQ (75mg SQ BID).  3. Serial H/H. Keep Hgb > 8.0.  4. Hold ASA.  5. c/w PPI IV.  6. c/w Amiodarone, Coreg and PO Lasix.      Gaurav Neely MD  [id number] ASSESSMENT  1. 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 Acute blood loss anemia sec to GI bleeding, source undetermined  3. Hx of gastric ulcers  4. Hx of VT    PLAN  1. Video capsule endoscopy in progressTo be completed tonight at 8PM. Small superficial gastric ulcer  found on EGD.   2. Resume A/C with Lovenox SQ (75mg SQ BID).  3. Serial H/H. Keep Hgb > 8.0.  $. c/w PPI IV.  6. c/w Amiodarone, Coreg and PO Lasix.      Gaurav Neely MD  [id number] ASSESSMENT  1. 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 Acute blood loss anemia sec to GI bleeding, source undetermined  3. Hx of gastric ulcers  4. Hx of VT    PLAN  1. Capsule endoscopy results pending. Small superficial gastric ulcer  found on EGD.   2. Resume A/C with Lovenox SQ (75mg SQ BID).  3. Serial H/H. Keep Hgb > 8.0.  $. c/w PPI IV.  6. c/w Amiodarone, Coreg and PO Lasix.      Gaurav Neely MD  [id number] 20-Jan-2024 10:38

## 2024-01-22 NOTE — PRE-ANESTHESIA EVALUATION ADULT - NSANTHOSAYNRD_GEN_A_CORE
No. HAYDEE screening performed.  STOP BANG Legend: 0-2 = LOW Risk; 3-4 = INTERMEDIATE Risk; 5-8 = HIGH Risk
No. HAYDEE screening performed.  STOP BANG Legend: 0-2 = LOW Risk; 3-4 = INTERMEDIATE Risk; 5-8 = HIGH Risk

## 2024-01-22 NOTE — ASU DISCHARGE PLAN (ADULT/PEDIATRIC) - CARE PROVIDER_API CALL
Felipe Pedro  Orthopaedic Trauma  [address], [address]  [place] [address]  Phone: [phone]  Fax: [phone]  Follow Up Time:

## 2024-01-22 NOTE — PRE-ANESTHESIA EVALUATION ADULT - NSRADCARDRESULTSFT_GEN_ALL_CORE
< from: Transthoracic Echocardiogram (11.05.20 @ 09:00) >    Dimensions:    Normal Values:  LA:     3.1    2.0 - 4.0 cm  Ao:     2.9    2.0 - 3.8 cm  SEPTUM: 0.8    0.6 - 1.2 cm  PWT:    0.8    0.6- 1.1 cm  LVIDd:  3.8    3.0 - 5.6 cm  LVIDs:  2.5    1.8 - 4.0 cm  Derived variables:  LVMI: 52 g/m2  RWT: 0.42  EF (Visual Estimate): 65 %  Doppler Peak Velocity (m/sec): AoV=1.3  ------------------------------------------------------------------------  Observations:  Mitral Valve: Normal mitral valve. Minimal mitral  regurgitation.  Aortic Valve/Aorta: Normal aortic valve.  Normal aortic root size.  Left Atrium: Normal left atrium. Interatrial septal  aneurysm.  Left Ventricle: Normal left ventricular internal dimensions  and wall thicknesses.  Normal left ventricular systolic function. No segmental  wall motion abnormalities.  Impaired LV-relaxation with normal filling pressure.  Right Heart: Normal right atrium. Normal right ventricular  size and function.  Normal tricuspid valve. Normal pulmonic valve.  Pericardium/Pleura: Normal pericardium with no pericardial  effusion.  Hemodynamic: Estimated right atrial pressure is normal.  No evidence of pulmoanry hypertension.  No PFO seen with color Doppler.  ------------------------------------------------------------------------  Conclusions:  Normal left ventricular systolic function. No segmental  wall motion abnormalities.    < end of copied text >

## 2024-01-22 NOTE — PRE-ANESTHESIA EVALUATION ADULT - NSANTHPMHFT_GEN_ALL_CORE
68M pmhx HTN, CKD, anemia, rheumatic fever, BPH presenting for ORIF R olecranon. Patient denies chest pain, SOB, GERD symptoms. Patient with 4+ mets, normal echo. Plan for general anesthesia.

## 2024-01-22 NOTE — ASU DISCHARGE PLAN (ADULT/PEDIATRIC) - ASU DC SPECIAL INSTRUCTIONSFT
Follow up with Dr Pedor in 10-14 days. Please call office for appointment. Take medications as prescribed. Keep dressing clean, dry, and intact. Rest, ice, and elevate affected extremity. Follow up with Dr Pedro in 10-14 days. Please call office for appointment. Take medications as prescribed. Keep dressing clean, dry, and intact. Rest, ice, and elevate affected extremity. May shower only if aquacel bandage has no leaks and is sealed, otherwise do not get your incision wet. Sling may be worn for comfort, range ot motion of the elbow as tolerated is encouraged.

## 2024-02-08 ENCOUNTER — APPOINTMENT (OUTPATIENT)
Dept: ORTHOPEDIC SURGERY | Facility: CLINIC | Age: 69
End: 2024-02-08
Payer: MEDICARE

## 2024-02-08 VITALS — BODY MASS INDEX: 21.66 KG/M2 | WEIGHT: 130 LBS | HEIGHT: 65 IN

## 2024-02-08 PROBLEM — D64.9 ANEMIA, UNSPECIFIED: Chronic | Status: ACTIVE | Noted: 2024-01-19

## 2024-02-08 PROCEDURE — 99024 POSTOP FOLLOW-UP VISIT: CPT

## 2024-02-08 NOTE — HISTORY OF PRESENT ILLNESS
[de-identified] : s/p ORIF right olecranon, DOS: 1/22/24 [de-identified] : Mr. NANI SHI is a 68 year old gentleman presenting for post-op appointment s/p ORIF right olecranon on 1/22/24. Since his surgery he states he is feeling better. He notes mild pain at the right elbow. [de-identified] : The patient is sitting comfortably in the exam room.  RIGHT arm. -Skin is intact, no swelling, no ecchymosis -Incision is clean and dry, no erythema, no signs of infection -No tenderness to palpation medially or laterally -No pain with palpation over the radial head with range of motion -Range of motion elbow  -Pronation 90, supination 90 -Stable to varus and valgus stress -Able to make a fist -Sensation is intact median, radial, ulnar, axillary nerves -Motor is intact median, radial, ulnar, axillary nerves -Hand is warm and well-perfused, Palpable radial and ulnar pulses  [de-identified] : No Xrays were taken in the office today, 2/8/24 [de-identified] : 68-year-old gentleman s/p ORIF right olecranon, approximately 2 weeks out. [de-identified] : -X-ray and physical exam findings were discussed with the patient -NWB right UE -Sling for comfort -Physical therapy: work on ROM of the right elbow to prevent stiffness -Follow up in 4 weeks with x-rays of the right elbow at that time. -All the patient's questions and concerns were addressed during this visit

## 2024-03-07 ENCOUNTER — APPOINTMENT (OUTPATIENT)
Dept: ORTHOPEDIC SURGERY | Facility: CLINIC | Age: 69
End: 2024-03-07
Payer: MEDICARE

## 2024-03-07 PROBLEM — Z87.438 PERSONAL HISTORY OF OTHER DISEASES OF MALE GENITAL ORGANS: Chronic | Status: ACTIVE | Noted: 2024-01-19

## 2024-03-07 PROBLEM — N18.9 CHRONIC KIDNEY DISEASE, UNSPECIFIED: Chronic | Status: ACTIVE | Noted: 2024-01-19

## 2024-03-07 PROCEDURE — 73080 X-RAY EXAM OF ELBOW: CPT | Mod: RT

## 2024-03-07 PROCEDURE — 99024 POSTOP FOLLOW-UP VISIT: CPT

## 2024-03-07 NOTE — HISTORY OF PRESENT ILLNESS
[de-identified] : s/p ORIF right olecranon, DOS: 1/22/24 [de-identified] : The patient is sitting comfortably in the exam room.  RIGHT arm. -Skin is intact, no swelling, no ecchymosis -Incision is well-healed, no erythema, no signs of infection -No tenderness to palpation medially or laterally -No pain with palpation over the radial head with range of motion -Range of motion elbow  -Pronation 90, supination 90 -Stable to varus and valgus stress -Able to make a fist -Sensation is intact median, radial, ulnar, axillary nerves -Motor is intact median, radial, ulnar, axillary nerves -Hand is warm and well-perfused, Palpable radial and ulnar pulses  [de-identified] : Mr. NANI SHI is a 68 year old gentleman presenting for post-op appointment s/p ORIF right olecranon on 1/22/24. Since his surgery he states he is feeling better. He is participating in PT twice a week.  [de-identified] :  X-rays of the right elbow were taken in the office today, 3/7/24. AP, Oblique and Lateral.  X-rays show good overall alignment of the elbow.  Patient is status post ORIF olecranon.  No displacement of the fracture.  Implants are in good position.  No lucencies around the screws. [de-identified] : 68-year-old gentleman s/p ORIF right olecranon, approximately 6.5 weeks out. [de-identified] : -X-ray and physical exam findings were discussed with the patient -5lb weight limit right UE -Physical therapy: work on ROM of the right elbow to prevent stiffness -Follow up in 2 months with x-rays of the right elbow at that time. -All the patient's questions and concerns were addressed during this visit  4 = No assist / stand by assistance

## 2024-04-19 ENCOUNTER — APPOINTMENT (OUTPATIENT)
Dept: NEPHROLOGY | Facility: CLINIC | Age: 69
End: 2024-04-19

## 2024-05-02 ENCOUNTER — APPOINTMENT (OUTPATIENT)
Dept: ORTHOPEDIC SURGERY | Facility: CLINIC | Age: 69
End: 2024-05-02
Payer: MEDICARE

## 2024-05-02 DIAGNOSIS — S52.021A DISPLACED FRACTURE OF OLECRANON PROCESS W/OUT INTRAARTICULAR EXTENSION OF RIGHT ULNA, INITIAL ENCOUNTER FOR CLOSED FRACTURE: ICD-10-CM

## 2024-05-02 PROCEDURE — 99213 OFFICE O/P EST LOW 20 MIN: CPT

## 2024-05-02 PROCEDURE — 73080 X-RAY EXAM OF ELBOW: CPT | Mod: RT

## 2024-05-24 ENCOUNTER — NON-APPOINTMENT (OUTPATIENT)
Age: 69
End: 2024-05-24

## 2024-06-05 NOTE — REASON FOR VISIT
[Follow-Up Visit] : a follow-up visit for [FreeTextEntry2] : s/p ORIF right olecranon, DOS: 1/22/24.

## 2024-06-05 NOTE — DISCUSSION/SUMMARY
[de-identified] : 68-year-old gentleman s/p ORIF right olecranon, approximately 3.5 months out.  -X-ray and physical exam findings were discussed with the patient -Weightbearing as tolerated right UE -Physical therapy: work on ROM of the right elbow to prevent stiffness -Follow up in 3 months with x-rays of the right elbow at that time. -All the patient's questions and concerns were addressed during this visit.

## 2024-06-05 NOTE — PHYSICAL EXAM
[de-identified] : The patient is sitting comfortably in the exam room. RIGHT arm. -Skin is intact, no swelling, no ecchymosis -Incision is well-healed, no erythema, no signs of infection -No tenderness to palpation medially or laterally -No pain with palpation over the radial head with range of motion -Range of motion elbow  -Pronation 90, supination 90 -Stable to varus and valgus stress -Able to make a fist -Sensation is intact median, radial, ulnar, axillary nerves -Motor is intact median, radial, ulnar, axillary nerves -Hand is warm and well-perfused, Palpable radial and ulnar pulses. [de-identified] : X-rays of the right elbow were taken in the office today, 5/2/24. AP, Oblique and Lateral. X-rays show good overall alignment of the elbow. Patient is status post ORIF olecranon. No displacement of the fracture. Implants are in good position. No lucencies around the screws.

## 2024-06-05 NOTE — HISTORY OF PRESENT ILLNESS
[de-identified] : Mr. NANI SHI is a 68 year old gentleman presenting for post-op appointment s/p ORIF right olecranon on 1/22/24. Since his last visit he states he is feeling better. He  has been participating in PT twice a week.

## 2024-07-05 ENCOUNTER — APPOINTMENT (OUTPATIENT)
Dept: INTERNAL MEDICINE | Facility: CLINIC | Age: 69
End: 2024-07-05

## 2024-07-25 ENCOUNTER — LABORATORY RESULT (OUTPATIENT)
Age: 69
End: 2024-07-25

## 2024-07-25 ENCOUNTER — APPOINTMENT (OUTPATIENT)
Dept: INTERNAL MEDICINE | Facility: CLINIC | Age: 69
End: 2024-07-25
Payer: MEDICARE

## 2024-07-25 VITALS
DIASTOLIC BLOOD PRESSURE: 68 MMHG | TEMPERATURE: 97 F | HEIGHT: 65 IN | WEIGHT: 132 LBS | OXYGEN SATURATION: 100 % | BODY MASS INDEX: 21.99 KG/M2 | RESPIRATION RATE: 12 BRPM | SYSTOLIC BLOOD PRESSURE: 138 MMHG | HEART RATE: 68 BPM

## 2024-07-25 DIAGNOSIS — Z00.00 ENCOUNTER FOR GENERAL ADULT MEDICAL EXAMINATION W/OUT ABNORMAL FINDINGS: ICD-10-CM

## 2024-07-25 LAB
25(OH)D3 SERPL-MCNC: 57.7 NG/ML
ALBUMIN SERPL ELPH-MCNC: 4.6 G/DL
ALP BLD-CCNC: 86 U/L
ALT SERPL-CCNC: 12 U/L
ANION GAP SERPL CALC-SCNC: 15 MMOL/L
APPEARANCE: CLEAR
AST SERPL-CCNC: 17 U/L
BASOPHILS # BLD AUTO: 0.06 K/UL
BASOPHILS NFR BLD AUTO: 1 %
BILIRUB SERPL-MCNC: 0.2 MG/DL
BILIRUBIN URINE: NEGATIVE
BLOOD URINE: NEGATIVE
BUN SERPL-MCNC: 33 MG/DL
CALCIUM SERPL-MCNC: 9.4 MG/DL
CHLORIDE SERPL-SCNC: 101 MMOL/L
CHOLEST SERPL-MCNC: 225 MG/DL
CO2 SERPL-SCNC: 27 MMOL/L
COLOR: YELLOW
CREAT SERPL-MCNC: 2.27 MG/DL
EGFR: 30 ML/MIN/1.73M2
EOSINOPHIL # BLD AUTO: 0.33 K/UL
EOSINOPHIL NFR BLD AUTO: 5.3 %
ESTIMATED AVERAGE GLUCOSE: 114 MG/DL
FERRITIN SERPL-MCNC: 47 NG/ML
GLUCOSE QUALITATIVE U: NEGATIVE MG/DL
GLUCOSE SERPL-MCNC: 92 MG/DL
HBA1C MFR BLD HPLC: 5.6 %
HCT VFR BLD CALC: 35 %
HDLC SERPL-MCNC: 77 MG/DL
HGB BLD-MCNC: 11.2 G/DL
IMM GRANULOCYTES NFR BLD AUTO: 0.2 %
IRON SATN MFR SERPL: 17 %
IRON SERPL-MCNC: 46 UG/DL
KETONES URINE: NEGATIVE MG/DL
LDLC SERPL CALC-MCNC: 135 MG/DL
LEUKOCYTE ESTERASE URINE: ABNORMAL
LYMPHOCYTES # BLD AUTO: 1.44 K/UL
LYMPHOCYTES NFR BLD AUTO: 23.2 %
MAN DIFF?: NORMAL
MCHC RBC-ENTMCNC: 29.7 PG
MCHC RBC-ENTMCNC: 32 GM/DL
MCV RBC AUTO: 92.8 FL
MONOCYTES # BLD AUTO: 0.68 K/UL
MONOCYTES NFR BLD AUTO: 11 %
NEUTROPHILS # BLD AUTO: 3.69 K/UL
NEUTROPHILS NFR BLD AUTO: 59.3 %
NITRITE URINE: NEGATIVE
NONHDLC SERPL-MCNC: 148 MG/DL
PH URINE: 7
PLATELET # BLD AUTO: 350 K/UL
POTASSIUM SERPL-SCNC: 4.9 MMOL/L
PROT SERPL-MCNC: 7.2 G/DL
PROTEIN URINE: NORMAL MG/DL
PSA SERPL-MCNC: 1.96 NG/ML
RBC # BLD: 3.77 M/UL
RBC # FLD: 13.8 %
SODIUM SERPL-SCNC: 144 MMOL/L
SPECIFIC GRAVITY URINE: 1.01
TIBC SERPL-MCNC: 266 UG/DL
TRIGL SERPL-MCNC: 79 MG/DL
TSH SERPL-ACNC: 1.61 UIU/ML
UIBC SERPL-MCNC: 220 UG/DL
UROBILINOGEN URINE: 0.2 MG/DL
VIT B12 SERPL-MCNC: 579 PG/ML
WBC # FLD AUTO: 6.21 K/UL

## 2024-07-25 PROCEDURE — 99397 PER PM REEVAL EST PAT 65+ YR: CPT

## 2024-07-25 PROCEDURE — G0444 DEPRESSION SCREEN ANNUAL: CPT | Mod: 59

## 2024-07-25 NOTE — HISTORY OF PRESENT ILLNESS
[de-identified] : Drake Reed 69 year old male with h/o HTN, rheumatic fever,BPH s/p TURP 4/2022, stage 3 kidney disease, presents for comprehensive physical exam.   Pt reports that he had a fall a few months ago on January 5th where he had a fall  and fractured his Rt arm. He then had surgery on his Rt arm on January 20th. He has since recovered well and states that he is feeling much better. He is not currently taking any medications. He states that he lives alone. Follows with his cardiologist, Dr. Spencer regularly. Would like a referral for a nephrologist since he would like to see a different doctor. Denies rectal pain, melena, blood in stool, oddly shaped stool, constipation, diarrhea.  Denies any exertional chest pain, dyspnea, palpitations, headaches, and dizziness. He is otherwise fine and offers no complaints.

## 2024-07-25 NOTE — HEALTH RISK ASSESSMENT
[Fair] : ~his/her~ current health as fair  [Good] : ~his/her~  mood as  good [No] : No [Any fall with injury in past year] : Patient reported fall with injury in the past year [Little interest or pleasure doing things] : 1) Little interest or pleasure doing things [Feeling down, depressed, or hopeless] : 2) Feeling down, depressed, or hopeless [0] : 2) Feeling down, depressed, or hopeless: Not at all (0) [PHQ-2 Negative - No further assessment needed] : PHQ-2 Negative - No further assessment needed [Never] : Never [Patient reported colonoscopy was normal] : Patient reported colonoscopy was normal [Alone] : lives alone [Employed] : employed [College] : College [] :  [# Of Children ___] : has [unfilled] children [Feels Safe at Home] : Feels safe at home [Fully functional (bathing, dressing, toileting, transferring, walking, feeding)] : Fully functional (bathing, dressing, toileting, transferring, walking, feeding) [Fully functional (using the telephone, shopping, preparing meals, housekeeping, doing laundry, using] : Fully functional and needs no help or supervision to perform IADLs (using the telephone, shopping, preparing meals, housekeeping, doing laundry, using transportation, managing medications and managing finances) [Reports normal functional visual acuity (ie: able to read med bottle)] : Reports normal functional visual acuity [Reports changes in dental health] : Reports changes in dental health [Smoke Detector] : smoke detector [Carbon Monoxide Detector] : carbon monoxide detector [Seat Belt] :  uses seat belt [de-identified] : Maintains active by working part-time, streteching and walking,  [de-identified] : Admits his diet could be better.  [UBN2Dtjco] : 0 [Reports changes in hearing] : Reports no changes in hearing [Reports changes in vision] : Reports no changes in vision [Sunscreen] : does not use sunscreen [Travel to Developing Areas] : does not  travel to developing areas [TB Exposure] : is not being exposed to tuberculosis [Caregiver Concerns] : does not have caregiver concerns [ColonoscopyDate] : 2012 [de-identified] : Hasn't been to the ophthalmologist in 20 years. States that he had bilateral implants placed in eyes to help with vision, not LASIK surgery.  [de-identified] : Dental implants. Follows with dentist.

## 2024-07-25 NOTE — PLAN
[FreeTextEntry1] : See plan    Stage 3 CKD Nephrology referral   BPH- s/p TURP 4/13/2022 Urology f/u  Weight management, Healthy food choices, and increased physical active discussed   Labs ordered pt to follow-up in 1 week for results  .

## 2024-07-25 NOTE — PHYSICAL EXAM
[No Acute Distress] : no acute distress [Well Nourished] : well nourished [Well Developed] : well developed [Well-Appearing] : well-appearing [Normal Sclera/Conjunctiva] : normal sclera/conjunctiva [PERRL] : pupils equal round and reactive to light [EOMI] : extraocular movements intact [Normal Outer Ear/Nose] : the outer ears and nose were normal in appearance [Normal Oropharynx] : the oropharynx was normal [No JVD] : no jugular venous distention [No Lymphadenopathy] : no lymphadenopathy [Supple] : supple [Thyroid Normal, No Nodules] : the thyroid was normal and there were no nodules present [No Respiratory Distress] : no respiratory distress  [No Accessory Muscle Use] : no accessory muscle use [Clear to Auscultation] : lungs were clear to auscultation bilaterally [Normal Rate] : normal rate  [Regular Rhythm] : with a regular rhythm [Normal S1, S2] : normal S1 and S2 [Pedal Pulses Present] : the pedal pulses are present [No Edema] : there was no peripheral edema [Soft] : abdomen soft [Non Tender] : non-tender [Non-distended] : non-distended [No Masses] : no abdominal mass palpated [Normal Bowel Sounds] : normal bowel sounds [Normal Posterior Cervical Nodes] : no posterior cervical lymphadenopathy [Normal Anterior Cervical Nodes] : no anterior cervical lymphadenopathy [No CVA Tenderness] : no CVA  tenderness [No Spinal Tenderness] : no spinal tenderness [No Joint Swelling] : no joint swelling [Grossly Normal Strength/Tone] : grossly normal strength/tone [No Rash] : no rash [Coordination Grossly Intact] : coordination grossly intact [No Focal Deficits] : no focal deficits [Normal Gait] : normal gait [Normal Affect] : the affect was normal [Normal Insight/Judgement] : insight and judgment were intact [de-identified] : +healed vertical scar on Rt elbow

## 2024-07-25 NOTE — ADDENDUM
[FreeTextEntry1] :   Documented by Angelo Powers acting as a scribe for Dr. Anastasiya Hargrove. 07/25/2024   All medical record entries made by the Scribe were at my, Dr. Anastasiya Hargrove, direction and personally dictated by me on 07/25/2024. I have reviewed the chart and agree that the record accurately reflects my personal performance of the history, physical exam, assessment and plan. I have also personally directed, reviewed, and agreed with the chart.

## 2024-08-01 ENCOUNTER — APPOINTMENT (OUTPATIENT)
Dept: INTERNAL MEDICINE | Facility: CLINIC | Age: 69
End: 2024-08-01
Payer: MEDICARE

## 2024-08-01 DIAGNOSIS — D64.9 ANEMIA, UNSPECIFIED: ICD-10-CM

## 2024-08-01 DIAGNOSIS — N18.30 CHRONIC KIDNEY DISEASE, STAGE 3 UNSPECIFIED: ICD-10-CM

## 2024-08-01 DIAGNOSIS — E78.00 PURE HYPERCHOLESTEROLEMIA, UNSPECIFIED: ICD-10-CM

## 2024-08-01 PROCEDURE — 99214 OFFICE O/P EST MOD 30 MIN: CPT

## 2024-08-01 NOTE — PLAN
[FreeTextEntry1] : Follow up  lab results reviewed and discussed with patient  Mild anemia Pt declines colonoscopy  referred to hematology  Stage 3 CKD -BUN and creatinine is slightly elevated follow up with nephrology -referral given   Urine culture positive  -Pt denies any changes in urinary Sx and is reluctant to start ABX follow up with urology  Cholesterol slightly high Reinforced the importance of following a low cholesterol/low fat diet

## 2024-08-01 NOTE — END OF VISIT
[FreeTextEntry3] : Documented by Yoli Keane acting as a scribe for Dr. Hargrove. 08/01/2024   All medical record entries made by the scribe were at my, Dr. Hargrove, direction and personally dictated by me on 08/01/2024. I have reviewed the chart and agree that the record accurately reflects my personal performance of the history, physical exam, assessment and plan. I have also personally directed, reviewed, and agreed with the chart.

## 2024-08-01 NOTE — HISTORY OF PRESENT ILLNESS
[Home] : at home, [unfilled] , at the time of the visit. [Medical Office: ([facility])___] : at the medical office located in  [Verbal consent obtained from patient] : the patient, [unfilled] [de-identified] : Mr. NANI SHI is a 69 year old male with Hx of HTN, BPH s/p TURP 4/2022, stage 3 kidney disease, who presents for a follow up on recent lab results.  Pt states he is feeling well. Offers no complaints. Denies any SOB, CP, abdominal pain, N/V/D, headache, dizziness, or leg swelling.

## 2024-08-01 NOTE — HISTORY OF PRESENT ILLNESS
[Home] : at home, [unfilled] , at the time of the visit. [Medical Office: ([facility])___] : at the medical office located in  [Verbal consent obtained from patient] : the patient, [unfilled] [de-identified] : Mr. NANI SHI is a 69 year old male with Hx of HTN, BPH s/p TURP 4/2022, stage 3 kidney disease, who presents for a follow up on recent lab results.  Pt states he is feeling well. Offers no complaints. Denies any SOB, CP, abdominal pain, N/V/D, headache, dizziness, or leg swelling.

## 2024-08-01 NOTE — HISTORY OF PRESENT ILLNESS
[Home] : at home, [unfilled] , at the time of the visit. [Medical Office: ([facility])___] : at the medical office located in  [Verbal consent obtained from patient] : the patient, [unfilled] [de-identified] : Mr. NANI SHI is a 69 year old male with Hx of HTN, BPH s/p TURP 4/2022, stage 3 kidney disease, who presents for a follow up on recent lab results.  Pt states he is feeling well. Offers no complaints. Denies any SOB, CP, abdominal pain, N/V/D, headache, dizziness, or leg swelling. Double M-Plasty Complex Repair Preamble Text (Leave Blank If You Do Not Want): Extensive wide undermining was performed.

## 2024-08-08 ENCOUNTER — APPOINTMENT (OUTPATIENT)
Dept: ORTHOPEDIC SURGERY | Facility: CLINIC | Age: 69
End: 2024-08-08

## 2024-08-08 PROCEDURE — 99213 OFFICE O/P EST LOW 20 MIN: CPT

## 2024-08-08 PROCEDURE — 73080 X-RAY EXAM OF ELBOW: CPT | Mod: RT

## 2024-08-08 NOTE — HISTORY OF PRESENT ILLNESS
[de-identified] : Mr. NANI SHI is a 69 year old gentleman presenting for post-op appointment s/p ORIF right olecranon on 1/22/24. Since his last visit he states he is feeling better. He is participating in PT once a week. He notes mild pain to the right elbow from lifting something heavy.

## 2024-08-08 NOTE — DISCUSSION/SUMMARY
[de-identified] : 69-year-old gentleman s/p ORIF right olecranon, approximately 6.5 months out, doing great  -X-ray and physical exam findings were discussed with the patient -Weightbearing as tolerated right UE -Physical therapy: work on ROM of the right elbow to prevent stiffness -Follow up in 6 months with x-rays of the right elbow at that time. -All the patient's questions and concerns were addressed during this visit.

## 2024-08-08 NOTE — PHYSICAL EXAM
[de-identified] : The patient is sitting comfortably in the exam room. RIGHT arm. -Skin is intact, no swelling, no ecchymosis -Incision is well-healed, no erythema, no signs of infection -No tenderness to palpation medially or laterally -No pain with palpation over the radial head with range of motion -Range of motion elbow  -Pronation 90, supination 90 -Stable to varus and valgus stress -Able to make a fist -Sensation is intact median, radial, ulnar, axillary nerves -Motor is intact median, radial, ulnar, axillary nerves -Hand is warm and well-perfused, Palpable radial and ulnar pulses. [de-identified] : X-rays of the right elbow were taken in the office today, 8/8/24. AP, Oblique and Lateral. X-rays show good overall alignment of the elbow. Patient is status post ORIF olecranon. No displacement of the fracture. Implants are in good position. No lucencies around the screws.

## 2024-08-08 NOTE — HISTORY OF PRESENT ILLNESS
[de-identified] : Mr. NANI SHI is a 69 year old gentleman presenting for post-op appointment s/p ORIF right olecranon on 1/22/24. Since his last visit he states he is feeling better. He is participating in PT once a week. He notes mild pain to the right elbow from lifting something heavy.

## 2024-08-08 NOTE — DISCUSSION/SUMMARY
[de-identified] : 69-year-old gentleman s/p ORIF right olecranon, approximately 6.5 months out, doing great  -X-ray and physical exam findings were discussed with the patient -Weightbearing as tolerated right UE -Physical therapy: work on ROM of the right elbow to prevent stiffness -Follow up in 6 months with x-rays of the right elbow at that time. -All the patient's questions and concerns were addressed during this visit.

## 2024-09-12 ENCOUNTER — APPOINTMENT (OUTPATIENT)
Dept: INTERNAL MEDICINE | Facility: CLINIC | Age: 69
End: 2024-09-12
Payer: MEDICARE

## 2024-09-12 ENCOUNTER — LABORATORY RESULT (OUTPATIENT)
Age: 69
End: 2024-09-12

## 2024-09-12 VITALS
HEIGHT: 67 IN | OXYGEN SATURATION: 100 % | RESPIRATION RATE: 12 BRPM | BODY MASS INDEX: 21.19 KG/M2 | DIASTOLIC BLOOD PRESSURE: 75 MMHG | TEMPERATURE: 97.5 F | HEART RATE: 62 BPM | WEIGHT: 135 LBS | SYSTOLIC BLOOD PRESSURE: 124 MMHG

## 2024-09-12 DIAGNOSIS — E78.00 PURE HYPERCHOLESTEROLEMIA, UNSPECIFIED: ICD-10-CM

## 2024-09-12 DIAGNOSIS — N18.30 CHRONIC KIDNEY DISEASE, STAGE 3 UNSPECIFIED: ICD-10-CM

## 2024-09-12 DIAGNOSIS — D64.9 ANEMIA, UNSPECIFIED: ICD-10-CM

## 2024-09-12 DIAGNOSIS — N40.1 BENIGN PROSTATIC HYPERPLASIA WITH LOWER URINARY TRACT SYMPMS: ICD-10-CM

## 2024-09-12 DIAGNOSIS — N13.8 BENIGN PROSTATIC HYPERPLASIA WITH LOWER URINARY TRACT SYMPMS: ICD-10-CM

## 2024-09-12 LAB
ALBUMIN SERPL ELPH-MCNC: 4.4 G/DL
ALP BLD-CCNC: 93 U/L
ALT SERPL-CCNC: 14 U/L
ANION GAP SERPL CALC-SCNC: 11 MMOL/L
APPEARANCE: CLEAR
AST SERPL-CCNC: 19 U/L
BASOPHILS # BLD AUTO: 0.08 K/UL
BASOPHILS NFR BLD AUTO: 1.3 %
BILIRUB SERPL-MCNC: 0.3 MG/DL
BILIRUBIN URINE: NEGATIVE
BLOOD URINE: NEGATIVE
BUN SERPL-MCNC: 24 MG/DL
CALCIUM SERPL-MCNC: 9.8 MG/DL
CHLORIDE SERPL-SCNC: 102 MMOL/L
CHOLEST SERPL-MCNC: 215 MG/DL
CO2 SERPL-SCNC: 29 MMOL/L
COLOR: YELLOW
CREAT SERPL-MCNC: 2.23 MG/DL
EGFR: 31 ML/MIN/1.73M2
EOSINOPHIL # BLD AUTO: 0.26 K/UL
EOSINOPHIL NFR BLD AUTO: 4.3 %
GLUCOSE QUALITATIVE U: NEGATIVE MG/DL
GLUCOSE SERPL-MCNC: 81 MG/DL
HCT VFR BLD CALC: 35.5 %
HDLC SERPL-MCNC: 82 MG/DL
HGB BLD-MCNC: 11.5 G/DL
IMM GRANULOCYTES NFR BLD AUTO: 0.3 %
KETONES URINE: NEGATIVE MG/DL
LDLC SERPL CALC-MCNC: 119 MG/DL
LEUKOCYTE ESTERASE URINE: ABNORMAL
LYMPHOCYTES # BLD AUTO: 1.63 K/UL
LYMPHOCYTES NFR BLD AUTO: 26.8 %
MAN DIFF?: NORMAL
MCHC RBC-ENTMCNC: 29.6 PG
MCHC RBC-ENTMCNC: 32.4 GM/DL
MCV RBC AUTO: 91.3 FL
MONOCYTES # BLD AUTO: 0.64 K/UL
MONOCYTES NFR BLD AUTO: 10.5 %
NEUTROPHILS # BLD AUTO: 3.46 K/UL
NEUTROPHILS NFR BLD AUTO: 56.8 %
NITRITE URINE: NEGATIVE
NONHDLC SERPL-MCNC: 133 MG/DL
PH URINE: 7
PLATELET # BLD AUTO: 329 K/UL
POTASSIUM SERPL-SCNC: 5.8 MMOL/L
PROT SERPL-MCNC: 7.4 G/DL
PROTEIN URINE: 30 MG/DL
PSA SERPL-MCNC: 1.97 NG/ML
RBC # BLD: 3.89 M/UL
RBC # FLD: 14.1 %
SODIUM SERPL-SCNC: 143 MMOL/L
SPECIFIC GRAVITY URINE: 1.01
TRIGL SERPL-MCNC: 77 MG/DL
UROBILINOGEN URINE: 0.2 MG/DL
WBC # FLD AUTO: 6.09 K/UL

## 2024-09-12 PROCEDURE — 99214 OFFICE O/P EST MOD 30 MIN: CPT

## 2024-09-12 NOTE — REVIEW OF SYSTEMS
[Negative] : Heme/Lymph [Chest Pain] : no chest pain [Shortness Of Breath] : no shortness of breath [Dyspnea on Exertion] : not dyspnea on exertion [Abdominal Pain] : no abdominal pain [Nausea] : no nausea [Constipation] : no constipation [Diarrhea] : no diarrhea [Vomiting] : no vomiting [Dysuria] : no dysuria [Incontinence] : no incontinence [Hesitancy] : no hesitancy [Nocturia] : no nocturia [Hematuria] : no hematuria [Frequency] : no frequency [Dizziness] : no dizziness

## 2024-09-12 NOTE — PLAN
[FreeTextEntry1] : See plan.  CKD III will monitor renal function  US kidney/bladder  Pt. instructed to follow w/ nephrology  Anemia ? due to CKD Pt. instructed to follow up w/ hematology  RT inguinal hernia Pt. instructed to follow up w/ gen surg for hernia repair  HLD Low cholesterol/low fat diet & exercise discussed w/ pt.  Bloodwork ordered. Pt. instructed to follow up via telehealth in 1 week for lab results.

## 2024-09-12 NOTE — PHYSICAL EXAM
[No Acute Distress] : no acute distress [Well Nourished] : well nourished [Well Developed] : well developed [Well-Appearing] : well-appearing [Normal Sclera/Conjunctiva] : normal sclera/conjunctiva [PERRL] : pupils equal round and reactive to light [EOMI] : extraocular movements intact [Normal Outer Ear/Nose] : the outer ears and nose were normal in appearance [Normal Oropharynx] : the oropharynx was normal [No JVD] : no jugular venous distention [No Lymphadenopathy] : no lymphadenopathy [Supple] : supple [Thyroid Normal, No Nodules] : the thyroid was normal and there were no nodules present [No Respiratory Distress] : no respiratory distress  [No Accessory Muscle Use] : no accessory muscle use [Clear to Auscultation] : lungs were clear to auscultation bilaterally [Normal Rate] : normal rate  [Regular Rhythm] : with a regular rhythm [Normal S1, S2] : normal S1 and S2 [No Murmur] : no murmur heard [No Carotid Bruits] : no carotid bruits [Pedal Pulses Present] : the pedal pulses are present [No Edema] : there was no peripheral edema [Soft] : abdomen soft [Non Tender] : non-tender [Non-distended] : non-distended [Normal Bowel Sounds] : normal bowel sounds [Normal Posterior Cervical Nodes] : no posterior cervical lymphadenopathy [Normal Anterior Cervical Nodes] : no anterior cervical lymphadenopathy [No CVA Tenderness] : no CVA  tenderness [No Spinal Tenderness] : no spinal tenderness [No Joint Swelling] : no joint swelling [Grossly Normal Strength/Tone] : grossly normal strength/tone [No Rash] : no rash [Coordination Grossly Intact] : coordination grossly intact [No Focal Deficits] : no focal deficits [Normal Gait] : normal gait [Normal Affect] : the affect was normal [Normal Insight/Judgement] : insight and judgment were intact [de-identified] : +large  RT inguinal hernia

## 2024-09-12 NOTE — HISTORY OF PRESENT ILLNESS
[de-identified] : Mr. NANI SHI is a 69 year old male with Hx of HTN, BPH s/p TURP 4/2022, and CKD III presenting for follow up on chronic problems. Pt. states that he is doing well and feeling fine. He says that he has stopped eating cheese/cream cheese, states that he eats plant-based cheese. He is not yet following w/ a nephrologist for his CKD. He has not yet followed w/ hematology for his anemia. He has not yet had a repair procedure for his RT inguinal hernia. He plans to follow w/ urology in November d/t increased PSA levels and Hx. TURP. Denies any urinary sxs at present. Denies abdominal pain, N/V, C/D. Denies CP, PEACE, SOB, dizziness. He is otherwise well and offers no additional complaints.

## 2024-10-01 DIAGNOSIS — A49.9 URINARY TRACT INFECTION, SITE NOT SPECIFIED: ICD-10-CM

## 2024-10-01 DIAGNOSIS — N39.0 URINARY TRACT INFECTION, SITE NOT SPECIFIED: ICD-10-CM

## 2024-10-03 DIAGNOSIS — E87.5 HYPERKALEMIA: ICD-10-CM

## 2024-10-03 DIAGNOSIS — R79.89 OTHER SPECIFIED ABNORMAL FINDINGS OF BLOOD CHEMISTRY: ICD-10-CM

## 2024-10-03 RX ORDER — AMOXICILLIN AND CLAVULANATE POTASSIUM 875; 125 MG/1; MG/1
875-125 TABLET, COATED ORAL
Qty: 10 | Refills: 0 | Status: ACTIVE | COMMUNITY
Start: 2024-10-03 | End: 1900-01-01

## 2024-10-03 RX ORDER — LEVOFLOXACIN 500 MG/1
500 TABLET, FILM COATED ORAL DAILY
Qty: 5 | Refills: 0 | Status: DISCONTINUED | COMMUNITY
Start: 2024-10-01 | End: 2024-10-03

## 2024-10-17 ENCOUNTER — APPOINTMENT (OUTPATIENT)
Dept: CARDIOLOGY | Facility: CLINIC | Age: 69
End: 2024-10-17

## 2024-10-31 ENCOUNTER — APPOINTMENT (OUTPATIENT)
Dept: NEPHROLOGY | Facility: CLINIC | Age: 69
End: 2024-10-31
Payer: MEDICARE

## 2024-10-31 VITALS
HEIGHT: 67 IN | DIASTOLIC BLOOD PRESSURE: 72 MMHG | TEMPERATURE: 97.7 F | SYSTOLIC BLOOD PRESSURE: 135 MMHG | HEART RATE: 64 BPM | OXYGEN SATURATION: 100 % | WEIGHT: 130.07 LBS | BODY MASS INDEX: 20.42 KG/M2

## 2024-10-31 DIAGNOSIS — I10 ESSENTIAL (PRIMARY) HYPERTENSION: ICD-10-CM

## 2024-10-31 DIAGNOSIS — R80.9 PROTEINURIA, UNSPECIFIED: ICD-10-CM

## 2024-10-31 DIAGNOSIS — E87.5 HYPERKALEMIA: ICD-10-CM

## 2024-10-31 DIAGNOSIS — Z86.39 PERSONAL HISTORY OF OTHER ENDOCRINE, NUTRITIONAL AND METABOLIC DISEASE: ICD-10-CM

## 2024-10-31 DIAGNOSIS — R79.89 OTHER SPECIFIED ABNORMAL FINDINGS OF BLOOD CHEMISTRY: ICD-10-CM

## 2024-10-31 DIAGNOSIS — D64.9 ANEMIA, UNSPECIFIED: ICD-10-CM

## 2024-10-31 DIAGNOSIS — E78.00 PURE HYPERCHOLESTEROLEMIA, UNSPECIFIED: ICD-10-CM

## 2024-10-31 DIAGNOSIS — N18.32 CHRONIC KIDNEY DISEASE, STAGE 3B: ICD-10-CM

## 2024-10-31 PROCEDURE — G2211 COMPLEX E/M VISIT ADD ON: CPT

## 2024-10-31 PROCEDURE — 99215 OFFICE O/P EST HI 40 MIN: CPT

## 2024-11-08 LAB
ALBUMIN SERPL ELPH-MCNC: 4.5 G/DL
ANION GAP SERPL CALC-SCNC: 16 MMOL/L
BUN SERPL-MCNC: 32 MG/DL
CALCIUM SERPL-MCNC: 9.5 MG/DL
CALCIUM SERPL-MCNC: 9.5 MG/DL
CHLORIDE SERPL-SCNC: 99 MMOL/L
CO2 SERPL-SCNC: 25 MMOL/L
CREAT SERPL-MCNC: 2.87 MG/DL
CREAT SPEC-SCNC: 62 MG/DL
CREAT SPEC-SCNC: 62 MG/DL
CREAT/PROT UR: 0.3 RATIO
CYSTATIN C SERPL-MCNC: 2.71 MG/L
EGFR: 23 ML/MIN/1.73M2
GFR/BSA.PRED SERPLBLD CYS-BASED-ARV: 20 ML/MIN/1.73M2
GLUCOSE SERPL-MCNC: 89 MG/DL
MICROALBUMIN 24H UR DL<=1MG/L-MCNC: 3.1 MG/DL
MICROALBUMIN/CREAT 24H UR-RTO: 50 MG/G
PARATHYROID HORMONE INTACT: 223 PG/ML
PHOSPHATE SERPL-MCNC: 4.5 MG/DL
POTASSIUM SERPL-SCNC: 5.1 MMOL/L
PROT UR-MCNC: 19 MG/DL
SODIUM SERPL-SCNC: 140 MMOL/L

## 2024-11-19 NOTE — DIETITIAN INITIAL EVALUATION ADULT. - PHYSICAL ASSESSMENT DORSAL HAND
Is postop, she is doing much better, does not continue to see gynecology.  We did recommend Pap screenings, patient will entertain, can schedule with one of my colleagues when ready.   moderate

## 2024-11-21 ENCOUNTER — APPOINTMENT (OUTPATIENT)
Dept: UROLOGY | Facility: CLINIC | Age: 69
End: 2024-11-21
Payer: MEDICARE

## 2024-11-21 VITALS
DIASTOLIC BLOOD PRESSURE: 75 MMHG | WEIGHT: 128 LBS | OXYGEN SATURATION: 99 % | HEART RATE: 79 BPM | SYSTOLIC BLOOD PRESSURE: 147 MMHG | HEIGHT: 67 IN | BODY MASS INDEX: 20.09 KG/M2

## 2024-11-21 DIAGNOSIS — N40.1 BENIGN PROSTATIC HYPERPLASIA WITH LOWER URINARY TRACT SYMPMS: ICD-10-CM

## 2024-11-21 DIAGNOSIS — N13.8 BENIGN PROSTATIC HYPERPLASIA WITH LOWER URINARY TRACT SYMPMS: ICD-10-CM

## 2024-11-21 DIAGNOSIS — N20.0 CALCULUS OF KIDNEY: ICD-10-CM

## 2024-11-21 PROCEDURE — 99214 OFFICE O/P EST MOD 30 MIN: CPT

## 2024-11-21 PROCEDURE — G2211 COMPLEX E/M VISIT ADD ON: CPT

## 2024-11-21 PROCEDURE — 51798 US URINE CAPACITY MEASURE: CPT

## 2024-11-22 LAB
APPEARANCE: ABNORMAL
BACTERIA: NEGATIVE /HPF
BILIRUBIN URINE: NEGATIVE
BLOOD URINE: ABNORMAL
CAST: 5 /LPF
COLOR: YELLOW
EPITHELIAL CELLS: 0 /HPF
GLUCOSE QUALITATIVE U: NEGATIVE MG/DL
KETONES URINE: NEGATIVE MG/DL
LEUKOCYTE ESTERASE URINE: ABNORMAL
MICROSCOPIC-UA: NORMAL
NITRITE URINE: NEGATIVE
PH URINE: 6
PROTEIN URINE: 30 MG/DL
RED BLOOD CELLS URINE: 2 /HPF
SPECIFIC GRAVITY URINE: 1.02
UROBILINOGEN URINE: 0.2 MG/DL
WHITE BLOOD CELLS URINE: 421 /HPF

## 2024-11-24 LAB — BACTERIA UR CULT: NORMAL

## 2024-12-07 ENCOUNTER — OUTPATIENT (OUTPATIENT)
Dept: OUTPATIENT SERVICES | Facility: HOSPITAL | Age: 69
LOS: 1 days | End: 2024-12-07
Payer: MEDICARE

## 2024-12-07 ENCOUNTER — APPOINTMENT (OUTPATIENT)
Dept: CT IMAGING | Facility: IMAGING CENTER | Age: 69
End: 2024-12-07

## 2024-12-07 DIAGNOSIS — N20.0 CALCULUS OF KIDNEY: ICD-10-CM

## 2024-12-07 DIAGNOSIS — Z90.89 ACQUIRED ABSENCE OF OTHER ORGANS: Chronic | ICD-10-CM

## 2024-12-07 DIAGNOSIS — Z90.79 ACQUIRED ABSENCE OF OTHER GENITAL ORGAN(S): Chronic | ICD-10-CM

## 2024-12-07 DIAGNOSIS — Z98.890 OTHER SPECIFIED POSTPROCEDURAL STATES: Chronic | ICD-10-CM

## 2024-12-07 PROCEDURE — 74176 CT ABD & PELVIS W/O CONTRAST: CPT

## 2024-12-07 PROCEDURE — 74176 CT ABD & PELVIS W/O CONTRAST: CPT | Mod: 26

## 2025-01-02 ENCOUNTER — APPOINTMENT (OUTPATIENT)
Dept: INTERNAL MEDICINE | Facility: CLINIC | Age: 70
End: 2025-01-02

## 2025-02-06 ENCOUNTER — APPOINTMENT (OUTPATIENT)
Dept: NEPHROLOGY | Facility: CLINIC | Age: 70
End: 2025-02-06
Payer: MEDICARE

## 2025-02-06 VITALS
HEIGHT: 67 IN | SYSTOLIC BLOOD PRESSURE: 143 MMHG | TEMPERATURE: 98.1 F | HEART RATE: 77 BPM | OXYGEN SATURATION: 99 % | DIASTOLIC BLOOD PRESSURE: 69 MMHG

## 2025-02-06 VITALS — HEART RATE: 74 BPM | SYSTOLIC BLOOD PRESSURE: 120 MMHG | DIASTOLIC BLOOD PRESSURE: 72 MMHG

## 2025-02-06 DIAGNOSIS — N20.0 CALCULUS OF KIDNEY: ICD-10-CM

## 2025-02-06 DIAGNOSIS — I10 ESSENTIAL (PRIMARY) HYPERTENSION: ICD-10-CM

## 2025-02-06 DIAGNOSIS — Z86.39 PERSONAL HISTORY OF OTHER ENDOCRINE, NUTRITIONAL AND METABOLIC DISEASE: ICD-10-CM

## 2025-02-06 DIAGNOSIS — N18.32 CHRONIC KIDNEY DISEASE, STAGE 3B: ICD-10-CM

## 2025-02-06 DIAGNOSIS — E78.00 PURE HYPERCHOLESTEROLEMIA, UNSPECIFIED: ICD-10-CM

## 2025-02-06 DIAGNOSIS — N18.30 CHRONIC KIDNEY DISEASE, STAGE 3 UNSPECIFIED: ICD-10-CM

## 2025-02-06 DIAGNOSIS — R80.9 PROTEINURIA, UNSPECIFIED: ICD-10-CM

## 2025-02-06 DIAGNOSIS — N17.9 ACUTE KIDNEY FAILURE, UNSPECIFIED: ICD-10-CM

## 2025-02-06 PROCEDURE — G2211 COMPLEX E/M VISIT ADD ON: CPT

## 2025-02-06 PROCEDURE — 99215 OFFICE O/P EST HI 40 MIN: CPT

## 2025-02-20 ENCOUNTER — APPOINTMENT (OUTPATIENT)
Dept: ORTHOPEDIC SURGERY | Facility: CLINIC | Age: 70
End: 2025-02-20
Payer: MEDICARE

## 2025-02-20 VITALS — WEIGHT: 140 LBS | HEIGHT: 67 IN | BODY MASS INDEX: 21.97 KG/M2

## 2025-02-20 DIAGNOSIS — S52.021A DISPLACED FRACTURE OF OLECRANON PROCESS W/OUT INTRAARTICULAR EXTENSION OF RIGHT ULNA, INITIAL ENCOUNTER FOR CLOSED FRACTURE: ICD-10-CM

## 2025-02-20 PROCEDURE — 99213 OFFICE O/P EST LOW 20 MIN: CPT

## 2025-02-20 PROCEDURE — 73080 X-RAY EXAM OF ELBOW: CPT | Mod: RT

## 2025-04-24 ENCOUNTER — APPOINTMENT (OUTPATIENT)
Dept: ORTHOPEDIC SURGERY | Facility: CLINIC | Age: 70
End: 2025-04-24

## 2025-04-24 ENCOUNTER — NON-APPOINTMENT (OUTPATIENT)
Age: 70
End: 2025-04-24

## 2025-04-24 DIAGNOSIS — S52.021A DISPLACED FRACTURE OF OLECRANON PROCESS W/OUT INTRAARTICULAR EXTENSION OF RIGHT ULNA, INITIAL ENCOUNTER FOR CLOSED FRACTURE: ICD-10-CM

## 2025-04-24 PROCEDURE — 73080 X-RAY EXAM OF ELBOW: CPT | Mod: RT

## 2025-04-24 PROCEDURE — 99213 OFFICE O/P EST LOW 20 MIN: CPT

## 2025-05-08 ENCOUNTER — APPOINTMENT (OUTPATIENT)
Dept: NEPHROLOGY | Facility: CLINIC | Age: 70
End: 2025-05-08
Payer: MEDICARE

## 2025-05-08 VITALS — DIASTOLIC BLOOD PRESSURE: 68 MMHG | HEART RATE: 67 BPM | SYSTOLIC BLOOD PRESSURE: 109 MMHG

## 2025-05-08 VITALS
HEART RATE: 67 BPM | WEIGHT: 134 LBS | SYSTOLIC BLOOD PRESSURE: 125 MMHG | BODY MASS INDEX: 21.03 KG/M2 | HEIGHT: 67 IN | DIASTOLIC BLOOD PRESSURE: 79 MMHG | OXYGEN SATURATION: 99 % | TEMPERATURE: 97.3 F

## 2025-05-08 DIAGNOSIS — R80.9 PROTEINURIA, UNSPECIFIED: ICD-10-CM

## 2025-05-08 DIAGNOSIS — N20.0 CALCULUS OF KIDNEY: ICD-10-CM

## 2025-05-08 DIAGNOSIS — D64.9 ANEMIA, UNSPECIFIED: ICD-10-CM

## 2025-05-08 DIAGNOSIS — N18.32 CHRONIC KIDNEY DISEASE, STAGE 3B: ICD-10-CM

## 2025-05-08 DIAGNOSIS — I10 ESSENTIAL (PRIMARY) HYPERTENSION: ICD-10-CM

## 2025-05-08 DIAGNOSIS — E78.00 PURE HYPERCHOLESTEROLEMIA, UNSPECIFIED: ICD-10-CM

## 2025-05-08 PROCEDURE — 99215 OFFICE O/P EST HI 40 MIN: CPT

## 2025-05-08 PROCEDURE — G2211 COMPLEX E/M VISIT ADD ON: CPT

## 2025-08-07 ENCOUNTER — LABORATORY RESULT (OUTPATIENT)
Age: 70
End: 2025-08-07

## 2025-08-07 ENCOUNTER — APPOINTMENT (OUTPATIENT)
Dept: INTERNAL MEDICINE | Facility: CLINIC | Age: 70
End: 2025-08-07
Payer: MEDICARE

## 2025-08-07 VITALS
WEIGHT: 137 LBS | BODY MASS INDEX: 21.5 KG/M2 | SYSTOLIC BLOOD PRESSURE: 130 MMHG | OXYGEN SATURATION: 100 % | RESPIRATION RATE: 12 BRPM | TEMPERATURE: 97.9 F | HEART RATE: 66 BPM | HEIGHT: 67 IN | DIASTOLIC BLOOD PRESSURE: 75 MMHG

## 2025-08-07 DIAGNOSIS — N18.32 CHRONIC KIDNEY DISEASE, STAGE 3B: ICD-10-CM

## 2025-08-07 DIAGNOSIS — Z00.00 ENCOUNTER FOR GENERAL ADULT MEDICAL EXAMINATION W/OUT ABNORMAL FINDINGS: ICD-10-CM

## 2025-08-07 DIAGNOSIS — E78.00 PURE HYPERCHOLESTEROLEMIA, UNSPECIFIED: ICD-10-CM

## 2025-08-07 DIAGNOSIS — I10 ESSENTIAL (PRIMARY) HYPERTENSION: ICD-10-CM

## 2025-08-07 PROCEDURE — 99214 OFFICE O/P EST MOD 30 MIN: CPT

## 2025-08-15 DIAGNOSIS — N39.0 URINARY TRACT INFECTION, SITE NOT SPECIFIED: ICD-10-CM

## 2025-08-15 DIAGNOSIS — A49.9 URINARY TRACT INFECTION, SITE NOT SPECIFIED: ICD-10-CM

## 2025-08-15 LAB
25(OH)D3 SERPL-MCNC: 55 NG/ML
ALBUMIN SERPL ELPH-MCNC: 4.3 G/DL
ALP BLD-CCNC: 88 U/L
ALT SERPL-CCNC: 19 U/L
ANION GAP SERPL CALC-SCNC: 14 MMOL/L
APPEARANCE: ABNORMAL
AST SERPL-CCNC: 25 U/L
BASOPHILS # BLD AUTO: 0.09 K/UL
BASOPHILS NFR BLD AUTO: 1 %
BILIRUB SERPL-MCNC: 0.3 MG/DL
BILIRUBIN URINE: NEGATIVE
BLOOD URINE: ABNORMAL
BUN SERPL-MCNC: 29 MG/DL
CALCIUM SERPL-MCNC: 10.1 MG/DL
CHLORIDE SERPL-SCNC: 100 MMOL/L
CHOLEST SERPL-MCNC: 210 MG/DL
CO2 SERPL-SCNC: 28 MMOL/L
COLOR: YELLOW
CREAT SERPL-MCNC: 2.1 MG/DL
EGFRCR SERPLBLD CKD-EPI 2021: 33 ML/MIN/1.73M2
EOSINOPHIL # BLD AUTO: 0.36 K/UL
EOSINOPHIL NFR BLD AUTO: 4.1 %
ESTIMATED AVERAGE GLUCOSE: 114 MG/DL
FERRITIN SERPL-MCNC: 32 NG/ML
GLUCOSE QUALITATIVE U: NEGATIVE MG/DL
GLUCOSE SERPL-MCNC: 105 MG/DL
HBA1C MFR BLD HPLC: 5.6 %
HCT VFR BLD CALC: 38.3 %
HDLC SERPL-MCNC: 69 MG/DL
HGB BLD-MCNC: 12.4 G/DL
IMM GRANULOCYTES NFR BLD AUTO: 0.3 %
IRON SATN MFR SERPL: 21 %
IRON SERPL-MCNC: 63 UG/DL
KETONES URINE: NEGATIVE MG/DL
LDLC SERPL-MCNC: 126 MG/DL
LEUKOCYTE ESTERASE URINE: ABNORMAL
LYMPHOCYTES # BLD AUTO: 1.12 K/UL
LYMPHOCYTES NFR BLD AUTO: 12.8 %
MAN DIFF?: NORMAL
MCHC RBC-ENTMCNC: 29.3 PG
MCHC RBC-ENTMCNC: 32.4 G/DL
MCV RBC AUTO: 90.5 FL
MONOCYTES # BLD AUTO: 0.72 K/UL
MONOCYTES NFR BLD AUTO: 8.2 %
NEUTROPHILS # BLD AUTO: 6.46 K/UL
NEUTROPHILS NFR BLD AUTO: 73.6 %
NITRITE URINE: NEGATIVE
NONHDLC SERPL-MCNC: 141 MG/DL
PH URINE: 6.5
PLATELET # BLD AUTO: 336 K/UL
POTASSIUM SERPL-SCNC: 6 MMOL/L
PROT SERPL-MCNC: 7 G/DL
PROTEIN URINE: 100 MG/DL
PSA SERPL-MCNC: 2.12 NG/ML
RBC # BLD: 4.23 M/UL
RBC # FLD: 13.6 %
SODIUM SERPL-SCNC: 142 MMOL/L
SPECIFIC GRAVITY URINE: 1.01
TIBC SERPL-MCNC: 298 UG/DL
TRIGL SERPL-MCNC: 83 MG/DL
TSH SERPL-ACNC: 1.75 UIU/ML
UIBC SERPL-MCNC: 235 UG/DL
UROBILINOGEN URINE: 0.2 MG/DL
VIT B12 SERPL-MCNC: 387 PG/ML
WBC # FLD AUTO: 8.78 K/UL

## 2025-08-15 RX ORDER — CIPROFLOXACIN HYDROCHLORIDE 500 MG/1
500 TABLET, FILM COATED ORAL
Qty: 10 | Refills: 0 | Status: ACTIVE | COMMUNITY
Start: 2025-08-15 | End: 1900-01-01

## 2025-08-29 ENCOUNTER — NON-APPOINTMENT (OUTPATIENT)
Age: 70
End: 2025-08-29

## 2025-08-29 ENCOUNTER — APPOINTMENT (OUTPATIENT)
Dept: CARDIOLOGY | Facility: CLINIC | Age: 70
End: 2025-08-29
Payer: MEDICARE

## 2025-08-29 VITALS
SYSTOLIC BLOOD PRESSURE: 120 MMHG | DIASTOLIC BLOOD PRESSURE: 72 MMHG | OXYGEN SATURATION: 98 % | WEIGHT: 137 LBS | HEART RATE: 67 BPM | RESPIRATION RATE: 12 BRPM | BODY MASS INDEX: 21.5 KG/M2 | HEIGHT: 67 IN

## 2025-08-29 DIAGNOSIS — I34.0 NONRHEUMATIC MITRAL (VALVE) INSUFFICIENCY: ICD-10-CM

## 2025-08-29 LAB — POTASSIUM SERPL-SCNC: 5.2 MMOL/L

## 2025-08-29 PROCEDURE — 99213 OFFICE O/P EST LOW 20 MIN: CPT | Mod: 25

## 2025-08-29 PROCEDURE — 93000 ELECTROCARDIOGRAM COMPLETE: CPT

## 2025-09-03 LAB
APPEARANCE: CLEAR
BACTERIA UR CULT: ABNORMAL
BACTERIA: NEGATIVE /HPF
BILIRUBIN URINE: NEGATIVE
BLOOD URINE: NEGATIVE
CAST: 0 /LPF
COLOR: YELLOW
EPITHELIAL CELLS: 0 /HPF
GLUCOSE QUALITATIVE U: NEGATIVE MG/DL
KETONES URINE: NEGATIVE MG/DL
LEUKOCYTE ESTERASE URINE: ABNORMAL
MICROSCOPIC-UA: NORMAL
NITRITE URINE: NEGATIVE
PH URINE: 7.5
PROTEIN URINE: 30 MG/DL
RED BLOOD CELLS URINE: 3 /HPF
SPECIFIC GRAVITY URINE: 1.02
UROBILINOGEN URINE: 0.2 MG/DL
WHITE BLOOD CELLS URINE: 22 /HPF

## 2025-09-18 ENCOUNTER — APPOINTMENT (OUTPATIENT)
Dept: CARDIOLOGY | Facility: CLINIC | Age: 70
End: 2025-09-18
Payer: MEDICARE

## 2025-09-18 PROCEDURE — 93306 TTE W/DOPPLER COMPLETE: CPT

## (undated) DEVICE — DRAPE U 47X51" NON STERILE

## (undated) DEVICE — POSITIONER FOAM HEADREST (PINK)

## (undated) DEVICE — ELCTR SUPER SECT

## (undated) DEVICE — SOL IRR BAG NS 0.9% 3000ML

## (undated) DEVICE — WARMING BLANKET LOWER ADULT

## (undated) DEVICE — TUBING SUCTION ENDOMAT LC

## (undated) DEVICE — SYR CATH TIP 2 OZ

## (undated) DEVICE — SUT POLYSORB 0 30" GS-21 UNDYED

## (undated) DEVICE — DRSG ACE BANDAGE 4" NS

## (undated) DEVICE — ELCTR BOVIE PENCIL SMOKE EVACUATION

## (undated) DEVICE — SOL IRR GLYCINE 1.5% 3000L

## (undated) DEVICE — GLV 8 PROTEXIS (BLUE)

## (undated) DEVICE — DRILL BIT SYNTHES ORTHO QC 2.5X110MM

## (undated) DEVICE — TUBING LEVEL ONE NORMOFLO SET

## (undated) DEVICE — SLING ARM CHIEFTAIN MESH MEDIUM

## (undated) DEVICE — GLV 8 PROTEXIS (WHITE)

## (undated) DEVICE — GLV 8 PROTEXIS (CREAM) MICRO

## (undated) DEVICE — CABLE DAC ACTIVE CORD

## (undated) DEVICE — LIJ/LIA-ADAPTER LCKNG ELLIK EVACUATING: Type: DURABLE MEDICAL EQUIPMENT

## (undated) DEVICE — GOWN TRIMAX LG

## (undated) DEVICE — STAPLER SKIN VISI-STAT 35 WIDE

## (undated) DEVICE — DRSG WEBRIL 4"

## (undated) DEVICE — PACK CYSTO

## (undated) DEVICE — VENODYNE/SCD SLEEVE CALF LARGE

## (undated) DEVICE — SUT MONOCRYL 3-0 18" PS-2 UNDYED

## (undated) DEVICE — CANISTER DISPOSABLE THIN WALL 3000CC

## (undated) DEVICE — DRSG STOCKINETTE IMPERVIOUS MED

## (undated) DEVICE — BAG URINE W METER 2L

## (undated) DEVICE — DRILL BIT SYNTHES ORTHO 2.0MM W DEPTH MARK QC 110MM

## (undated) DEVICE — DRAPE C ARM UNIVERSAL

## (undated) DEVICE — ELCTR PLASMA LOOP MEDIUM 24FR 12-30 DEG

## (undated) DEVICE — SOL IRR POUR H2O 250ML

## (undated) DEVICE — DRILL BIT SYNTHES ORTHO QC 3FLUTE 2.7X125MM

## (undated) DEVICE — GLV 8.5 PROTEXIS (WHITE)

## (undated) DEVICE — GLV 7.5 PROTEXIS (CREAM) MICRO

## (undated) DEVICE — DRSG DERMABOND 0.7ML

## (undated) DEVICE — VESSEL LOOP MINI-BLUE 0.075" X 16"

## (undated) DEVICE — SUT POLYSORB 2-0 30" GS-21 UNDYED

## (undated) DEVICE — DRSG STERISTRIPS 0.5 X 4"

## (undated) DEVICE — SOL IRR POUR NS 0.9% 500ML

## (undated) DEVICE — BAR ROLLER FOR ELITE ELCTR

## (undated) DEVICE — POSITIONER STRAP ARMBOARD VELCRO TS-30

## (undated) DEVICE — Device

## (undated) DEVICE — PACK EXTREMITY

## (undated) DEVICE — DRAPE IOBAN 23" X 23"

## (undated) DEVICE — VENODYNE/SCD SLEEVE CALF MEDIUM

## (undated) DEVICE — SOL IRR POUR H2O 500ML

## (undated) DEVICE — GLV 7.5 PROTEXIS (WHITE)

## (undated) DEVICE — TAPE SILK 3"

## (undated) DEVICE — WARMING BLANKET UPPER ADULT